# Patient Record
Sex: MALE | Race: WHITE | Employment: OTHER | URBAN - METROPOLITAN AREA
[De-identification: names, ages, dates, MRNs, and addresses within clinical notes are randomized per-mention and may not be internally consistent; named-entity substitution may affect disease eponyms.]

---

## 2018-01-08 ENCOUNTER — HOSPITAL ENCOUNTER (OUTPATIENT)
Dept: PHYSICAL THERAPY | Age: 73
Discharge: HOME | End: 2018-01-08
Payer: MEDICARE

## 2018-01-08 PROCEDURE — 97162 PT EVAL MOD COMPLEX 30 MIN: CPT | Performed by: PHYSICAL THERAPIST

## 2018-01-08 PROCEDURE — G8979 MOBILITY GOAL STATUS: HCPCS | Performed by: PHYSICAL THERAPIST

## 2018-01-08 PROCEDURE — 97140 MANUAL THERAPY 1/> REGIONS: CPT | Performed by: PHYSICAL THERAPIST

## 2018-01-08 PROCEDURE — G8978 MOBILITY CURRENT STATUS: HCPCS | Performed by: PHYSICAL THERAPIST

## 2018-01-08 PROCEDURE — 97110 THERAPEUTIC EXERCISES: CPT | Performed by: PHYSICAL THERAPIST

## 2018-01-08 NOTE — PROGRESS NOTES
PT DAILY TREATMENT NOTE - [facility]     Patient Name: Marguerite Tesfaye. Date:2018  : 1945  [x]  Patient  Verified  Payor: Giovanna Lynn / Plan: Alba Class / Product Type: Managed Care Medicare /    In time:301  Out time:341  Total Treatment Time (min): 40  Total Timed Codes (min): 30  1:1 Treatment Time ( [address] only): 40   Visit #: 1 of 24    Treatment Area: Left knee pain [M25.562]    SUBJECTIVE  Pain Level (0-10 scale): 3/10  Any medication changes, allergies to medications, adverse drug reactions, diagnosis change, or new procedure performed?: [x] No    [] Yes (see summary sheet for update)  Subjective functional status/changes:   [] No changes reported  HPI: Pt c/o increased pain in the L knee s/p TKR on 17. Reports he has been having a hard time getting his range of motion back and feels that he is falling behind. Reports on day following suurgery her rolled his compression stocking down and caused a tourniquet effect and increased pain in the L foot. Reports for two weeks he was recovering from that. States he had HHPT for 2-2.5 weeks following surgery but his insurance changed and had to stop HHPT and come to OPPT last week. Reports compliance w/ HEP and icing at home. Reports he only uses the [facility] when out of the house because of the ice. Reports 5 steps to enter his home w/ HR and is able to navigate then in step to pattern w/o difficulty. Pain increases w/ bending and decrease w/ sitting in his recliner w/ legs elevated. Hx of lumbar discectomy, L shoulder surgery to repair tendons, L CT sx, and gastric bypass. Pt is retired.      OBJECTIVE    Modality rationale: decrease edema and decrease inflammation to improve the patients ability to improve gait and activity tolerance    Min Type Additional Details    [] Estim:  []Unatt       []IFC  []Premod                        []Other:  []w/ice   []w/heat  Position:  Location:    [] Estim: []Att    []TENS instruct  []NMES []Other:  []w/US   []w/ice   []w/heat  Position:  Location:    []  Traction: [] Cervical       []Lumbar                       [] Prone          []Supine                       []Intermittent   []Continuous Lbs:  [] before manual  [] after manual    []  Ultrasound: []Continuous   [] Pulsed                           []1MHz   []3MHz W/cm2:  Location:    []  Iontophoresis with dexamethasone         Location: [] Take home patch   [] In clinic   7 (w/HEP review and instruction) [x]  Ice     []  heat  []  Ice massage  []  Laser   []  Anodyne Position: long sitting  Location: L knee    []  Laser with stim  []  Other:  Position:  Location:    []  Vasopneumatic Device Pressure:       [] lo [] med [] hi   Temperature: [] lo [] med [] hi   [] Skin assessment post-treatment:  []intact []redness- no adverse reaction    []redness - adverse reaction:     10 min [x]Eval                  []Re-Eval       15 min Therapeutic Exercise:  [] See flow sheet : reviewed HEP, progressed and instructed in new ex's   Rationale: increase ROM, increase strength, improve coordination and increase proprioception to improve the patients ability to decrease pain and improve gait     min Therapeutic Activity:  []  See flow sheet :   Rationale:   to improve the patients ability to       min Neuromuscular Re-education:  []  See flow sheet :   Rationale:   to improve the patients ability to     15 min Manual Therapy:  Knee flexion and ext mobs, patellar mobs, popliteal release   Rationale: decrease pain, increase ROM, increase tissue extensibility, decrease edema  and decrease trigger points to improve gait and activity tolerance      min Gait Training:  ___ feet with ___ device on level surfaces with ___ level of assist   Rationale:           With   [] TE   [] TA   [] neuro   [] other: Patient Education: [x] Review HEP    [] Progressed/Changed HEP based on:   [] positioning   [] body mechanics   [] transfers   [] heat/ice application    [] other: Other Objective/Functional Measures: Pt presents w/ antalgic gait and decrease L knee bend w/ ambulation, SPC R hand. AROM  L knee 9-93 deg (5-97 deg post manual therapy). Good patellar mobility noted, fair quad set, lag present w/ eccentric lowering from SLR. Incision is healing well and shows no signs of infection. Increased tightness noted in the L HS, gastroc and soleus, and popliteus. MMT L hip and knee grossly 3-3-/5 2' pain. Pain Level (0-10 scale) post treatment: 4-5/10    ASSESSMENT/Changes in Function: Focus on improving AROM and strength to improve stability and gait. Patient will continue to benefit from skilled PT services to modify and progress therapeutic interventions, address functional mobility deficits, address ROM deficits, address strength deficits, analyze and address soft tissue restrictions, analyze and cue movement patterns, analyze and modify body mechanics/ergonomics, address imbalance/dizziness and instruct in home and community integration to attain remaining goals. [x]  See Plan of Care  []  See progress note/recertification  []  See Discharge Summary         Progress towards goals / Updated goals:  Short Term Goals: To be accomplished in 2 weeks:  1. Pt will be independent and complaint w/ progressed HEP to progress gains in PT. At eval: reviewed, progressed, and initiated HEP  2. Pt will have AROM L knee to > or = to 3-105 deg for ease w/ return to normal ADLs. At eval: AROM L knee = 9-93 deg  Long Term Goals: To be accomplished in 8 weeks:  1. Pt will improve FOTO to > or = t 60 to demo improved function. At eval: FOTO = 32  2. Pt will increase AROM L knee to > or = to 3-120 deg for ease w/ normalizing gait. At eval: AROM L knee = 9-93 deg  3. Pt will increase MMT L LE to > or = to 4-4+/5 grossly for ease w/ stair navigation. At eval: MMT L hip and knee grossly 3-3-/5 2' pain  4.  Pt will demo correct heel toe gait w/o AD for ease w/ walking around the grocery store.    At eval: Pt presents w/ antalgic gait and decrease L knee bend w/ ambulation, SPC R hand     PLAN  []  Upgrade activities as tolerated     []  Continue plan of care  []  Update interventions per flow sheet       []  Discharge due to:_  [x]  Other: 3x/8 weeks     Bang More, PT 1/8/2018  4:02 PM    Future Appointments  Date Time Provider Gonzalo Deanna   1/11/2018 9:30 AM Donnita More, PT [facility]   1/12/2018 2:30 PM Elin Beverage, PTA [facility]   1/16/2018 10:00 AM Donnita More, PT [facility]   1/17/2018 7:30 AM Donnita More, PT [facility]   1/19/2018 3:00 PM Elin Beverage, PTA [facility]   1/22/2018 11:30 AM Elin Beverage, PTA [facility]   1/24/2018 8:30 AM [facility] PT [facility] 1 [facility]   1/26/2018 2:00 PM Donnita More, PT [facility]   1/29/2018 10:00 AM Elin Beverage, PTA [facility]   1/31/2018 10:00 AM Elin Beverage, PTA [facility]   2/1/2018 9:30 AM Donnita More, PT [facility]

## 2018-01-08 NOTE — PROGRESS NOTES
[facility] - [facility]              [address]        [place], [address]   [phone] fax    Plan of Care/ Statement of Necessity for Physical Therapy Services    Patient name: Jorge Adhikari Start of Care: 2018   Referral source: Alan Lake MD : 1945    Medical Diagnosis: Left knee pain [M25.562]   Onset Date:17    Treatment Diagnosis: s/p L TKR   Prior Hospitalization: see medical history Provider#: [id number]   Medications: Verified on Patient summary List    Comorbidities: allergies, BMI > 30, DM   Prior Level of Function: Hx of L knee pain, retired, walking w/o AD      The Plan of Care and following information is based on the information from the initial evaluation. Assessment/ key information: Pt is a 66 y/o male w/c/o increased pain in the L knee s/p TKR on 17. Reports he has been having a hard time getting his range of motion back and feels that he is falling behind. Reports on day following suurgery her rolled his compression stocking down and caused a tourniquet effect and increased pain in the L foot. Reports for two weeks he was recovering from that. States he had HHPT for 2-2.5 weeks following surgery but his insurance changed and had to stop HHPT and come to OPPT last week. Reports compliance w/ HEP and icing at home. Reports he only uses the [facility] when out of the house because of the ice. Reports 5 steps to enter his home w/ HR and is able to navigate then in step to pattern w/o difficulty. Pain increases w/ bending and decrease w/ sitting in his recliner w/ legs elevated. Hx of lumbar discectomy, L shoulder surgery to repair tendons, L CT sx, and gastric bypass. Pt is retired. Pt presents w/ antalgic gait and decrease L knee bend w/ ambulation, SPC R hand. AROM  L knee 9-93 deg (5-97 deg post manual therapy). Good patellar mobility noted, fair quad set, lag present w/ eccentric lowering from SLR.  Incision is healing well and shows no signs of infection. Increased tightness noted in the L HS, gastroc and soleus, and popliteus. MMT L hip and knee grossly 3-3-/5 2' pain. Pt will benefit from skilled PT to address the deficits and progress with pt goals. Evaluation Complexity History MEDIUM  Complexity : 1-2 comorbidities / personal factors will impact the outcome/ POC ; Examination MEDIUM Complexity : 3 Standardized tests and measures addressing body structure, function, activity limitation and / or participation in recreation  ;Presentation MEDIUM Complexity : Evolving with changing characteristics  ; Clinical Decision Making MEDIUM Complexity : FOTO score of 26-74  Overall Complexity Rating: MEDIUM  Problem List: pain affecting function, decrease ROM, decrease strength, edema affecting function, impaired gait/ balance, decrease ADL/ functional abilitiies, decrease activity tolerance and decrease transfer abilities   Treatment Plan may include any combination of the following: Therapeutic exercise, Therapeutic activities, Neuromuscular re-education, Physical agent/modality, Gait/balance training, Manual therapy, Patient education, Functional mobility training and Stair training  Patient / Family readiness to learn indicated by: asking questions, trying to perform skills and interest  Persons(s) to be included in education: patient (P)  Barriers to Learning/Limitations: None  Patient Goal (s): strengthen knee and leg to alleviate pain  Patient Self Reported Health Status: good  Rehabilitation Potential: good    Short Term Goals: To be accomplished in 2 weeks:  1. Pt will be independent and complaint w/ progressed HEP to progress gains in PT.   2. Pt will have AROM L knee to > or = to 3-105 deg for ease w/ return to normal ADLs. Long Term Goals: To be accomplished in 8 weeks:  1. Pt will improve FOTO to > or = to 60 to demo improved function.    2. Pt will increase AROM L knee to > or = to 3-120 deg for ease w/ normalizing gait. 3. Pt will increase MMT L LE to > or = to 4-4+/5 grossly for ease w/ stair navigation. 4. Pt will demo correct heel toe gait w/o AD for ease w/ walking around the grocery store. Frequency / Duration: Patient to be seen 3 times per week for 8 weeks. Patient/ Caregiver education and instruction: Diagnosis, prognosis, activity modification and exercises   [x]  Plan of care has been reviewed with PTA    G-Codes (GP)  Mobility   Current  CL= 60-79%   Goal  CK= 40-59%      The severity rating is based on clinical judgment and the FOTO score. Certification Period: 1/8/18 - 3/6/18  Tommy Bermudez, PT 1/8/2018 5:21 PM  ________________________________________________________________________    I certify that the above Therapy Services are being furnished while the patient is under my care. I agree with the treatment plan and certify that this therapy is necessary.     [de-identified] Signature:____________________  Date:____________Time: _________    Please sign and return to [facility] - [address], [address]  [phone] fax

## 2018-01-09 ENCOUNTER — APPOINTMENT (OUTPATIENT)
Dept: PHYSICAL THERAPY | Age: 73
End: 2018-01-09

## 2018-01-11 ENCOUNTER — APPOINTMENT (OUTPATIENT)
Dept: PHYSICAL THERAPY | Age: 73
End: 2018-01-11
Payer: MEDICARE

## 2018-01-11 ENCOUNTER — HOSPITAL ENCOUNTER (OUTPATIENT)
Dept: PHYSICAL THERAPY | Age: 73
Discharge: HOME | End: 2018-01-11
Payer: MEDICARE

## 2018-01-11 PROCEDURE — 97140 MANUAL THERAPY 1/> REGIONS: CPT | Performed by: PHYSICAL THERAPIST

## 2018-01-11 PROCEDURE — 97110 THERAPEUTIC EXERCISES: CPT | Performed by: PHYSICAL THERAPIST

## 2018-01-11 NOTE — PROGRESS NOTES
PT DAILY TREATMENT NOTE - [facility]     Patient Name: Luis Yuan. Date:2018  : 1945  [x]  Patient  Verified  Payor: Adonis Lemons / Plan: [employer] / Product Type: Managed Care Medicare /    In time:930  Out time:1031  Total Treatment Time (min): 61  Total Timed Codes (min): 55  1:1 Treatment Time ( [address] only): 30   Visit #: 2 of 24    Treatment Area: Left knee pain [M25.562]    SUBJECTIVE  Pain Level (0-10 scale): 310  Any medication changes, allergies to medications, adverse drug reactions, diagnosis change, or new procedure performed?: [x] No    [] Yes (see summary sheet for update)  Subjective functional status/changes:   [] No changes reported  Pt reports he had ulcers rupture in his stomach in over the weekend and has had a hard time doing anything because of the pain.      OBJECTIVE    Modality rationale: decrease edema, decrease inflammation and decrease pain to improve the patients ability to improve activity tolerance and soreness   Min Type Additional Details    [] Estim:  []Unatt       []IFC  []Premod                        []Other:  []w/ice   []w/heat  Position:  Location:    [] Estim: []Att    []TENS instruct  []NMES                    []Other:  []w/US   []w/ice   []w/heat  Position:  Location:    []  Traction: [] Cervical       []Lumbar                       [] Prone          []Supine                       []Intermittent   []Continuous Lbs:  [] before manual  [] after manual    []  Ultrasound: []Continuous   [] Pulsed                           []1MHz   []3MHz W/cm2:  Location:    []  Iontophoresis with dexamethasone         Location: [] Take home patch   [] In clinic   6 [x]  Ice     []  heat  []  Ice massage  []  Laser   []  Anodyne Position: supine w/ elevation  Location: L knee    []  Laser with stim  []  Other:  Position:  Location:    []  Vasopneumatic Device Pressure:       [] lo [] med [] hi   Temperature: [] lo [] med [] hi   [] Skin assessment post-treatment:  []intact []redness- no adverse reaction    []redness - adverse reaction:      min []Eval                  []Re-Eval       40 min Therapeutic Exercise:  [x] See flow sheet : initiated POC per flow sheet   Rationale: increase ROM, increase strength, improve coordination and increase proprioception to improve the patients ability to decrease pain and improve activity tolerance      min Therapeutic Activity:  []  See flow sheet :   Rationale:   to improve the patients ability to       min Neuromuscular Re-education:  []  See flow sheet :   Rationale:   to improve the patients ability to     15 min Manual Therapy:  Knee flexion and ext mobs, patellar mobs, popliteal release, STM to ITB and vastus lateralis    Rationale: decrease pain, increase ROM, increase tissue extensibility and decrease trigger points to improve gait and activity tolerance      min Gait Training:  ___ feet with ___ device on level surfaces with ___ level of assist   Rationale: With   [] TE   [] TA   [] neuro   [] other: Patient Education: [x] Review HEP    [] Progressed/Changed HEP based on:   [] positioning   [] body mechanics   [] transfers   [] heat/ice application    [] other:      Other Objective/Functional Measures: Pre manual AROM 14-90, post manual 5-95 deg     Pain Level (0-10 scale) post treatment: 4/10    ASSESSMENT/Changes in Function: Educated pt on not propping his knee w/ a pillow at night, educated on elevating whole leg or just the ankle but with just propping the knee will promote posterior mm tightness and decrease extension causing possibility of flexion contractures.  Encouraged pt to at least perform stretches and heel slides while he is feeling ill to maintain AROM and prevent contractures     Patient will continue to benefit from skilled PT services to modify and progress therapeutic interventions, address functional mobility deficits, address ROM deficits, address strength deficits, analyze and address soft tissue restrictions, analyze and cue movement patterns, analyze and modify body mechanics/ergonomics, address imbalance/dizziness and instruct in home and community integration to attain remaining goals. []  See Plan of Care  []  See progress note/recertification  []  See Discharge Summary         Progress towards goals / Updated goals:  Short Term Goals: To be accomplished in 2 weeks:  1. Pt will be independent and complaint w/ progressed HEP to progress gains in PT. At eval: reviewed, progressed, and initiated HEP  Current: not met, pt reports minimal exercises 2' illness 1/11/18  2. Pt will have AROM L knee to > or = to 3-105 deg for ease w/ return to normal ADLs. At eval: AROM L knee = 9-93 deg  Long Term Goals: To be accomplished in 8 weeks:  1. Pt will improve FOTO to > or = t 60 to demo improved function. At eval: FOTO = 32  2. Pt will increase AROM L knee to > or = to 3-120 deg for ease w/ normalizing gait. At eval: AROM L knee = 9-93 deg  3. Pt will increase MMT L LE to > or = to 4-4+/5 grossly for ease w/ stair navigation. At eval: MMT L hip and knee grossly 3-3-/5 2' pain  4. Pt will demo correct heel toe gait w/o AD for ease w/ walking around the grocery store.    At eval: Pt presents w/ antalgic gait and decrease L knee bend w/ ambulation, SPC R hand    PLAN  [x]  Upgrade activities as tolerated     [x]  Continue plan of care  []  Update interventions per flow sheet       []  Discharge due to:_  []  Other:_      Micheline Christensen PT 1/11/2018  9:40 AM    Future Appointments  Date Time Provider Gonzalo Huerta   1/12/2018 2:30 PM Willa Post PTA [facility]   1/16/2018 10:00 AM Micheline Christensen PT [facility]   1/17/2018 7:30 AM [facility]   1/19/2018 3:00 PM Willa Post PTA [facility]   1/22/2018 11:30 AM Willa Post, PTA [facility]   1/24/2018 8:30 AM [facility]   1/26/2018 2:00 PM Micheline hCristensen PT [facility]   1/29/2018 10:00 AM Chris Kern, PTA [facility]   1/31/2018 10:00 AM Chris Kern, PTA [facility]   2/1/2018 9:30 AM Mt Larson, PT [facility]

## 2018-01-12 ENCOUNTER — HOSPITAL ENCOUNTER (OUTPATIENT)
Dept: PHYSICAL THERAPY | Age: 73
Discharge: HOME | End: 2018-01-12
Payer: MEDICARE

## 2018-01-12 PROCEDURE — 97140 MANUAL THERAPY 1/> REGIONS: CPT

## 2018-01-12 PROCEDURE — 97110 THERAPEUTIC EXERCISES: CPT

## 2018-01-12 NOTE — PROGRESS NOTES
PT DAILY TREATMENT NOTE - [facility]     Patient Name: Bárbara Prince. Date:2018  : 1945  [x]  Patient  Verified  Payor: Merline Sloan / Plan: Makeda Cespedes / Product Type: Managed Care Medicare /    In time:227  Out time:310  Total Treatment Time (min): 43  Total Timed Codes (min): 43  1:1 Treatment Time ( [address] only): 43   Visit #: 3 of 24    Treatment Area: Left knee pain [M25.562]    SUBJECTIVE  Pain Level (0-10 scale): 2  Any medication changes, allergies to medications, adverse drug reactions, diagnosis change, or new procedure performed?: [x] No    [] Yes (see summary sheet for update)  Subjective functional status/changes:   [] No changes reported  Patient reports continuation of L ankle discomfort and swelling with cessation of anti-inflammatory medication secondary to stomach ulcers.     OBJECTIVE    33 min Therapeutic Exercise:  [x] See flow sheet : Emphasis placed on improving available knee AROM and strength and improving weightbearing functional mobility   Rationale: increase ROM and increase strength to improve the patients ability to improve ease with prolonged ambulation    10 min Manual Therapy:    Supine, L Femur AP Grade II Mobilization (TKE)  Supine, L Hamstring Contract-Relax  Supine, L Hamstring STM with Stick (90-90 position)  Supine, L Knee Extension Physiological Extension Mobilization (90-90)   Rationale: decrease pain, increase ROM and increase tissue extensibility to improve ease with transfers          With   [] TE   [] TA   [] neuro   [] other: Patient Education: [x] Review HEP    [] Progressed/Changed HEP based on:   [] positioning   [] body mechanics   [] transfers   [] heat/ice application    [] other:      Other Objective/Functional Measures:     Pre-Manual Knee AROM: 0-  Post-Manual Knee AROM: 0-7-104    Pain Level (0-10 scale) post treatment: 2    ASSESSMENT/Changes in Function: Question difficulties with achieving L knee terminal extension AROM secondary to chronic R knee flexion contracture with patient ambulating with maintenance of forward trunk flexion. Patient with poor activity tolerance thus limiting therapy progression. Patient will continue to benefit from skilled PT services to modify and progress therapeutic interventions, address functional mobility deficits, address ROM deficits, address strength deficits, analyze and address soft tissue restrictions, analyze and cue movement patterns and analyze and modify body mechanics/ergonomics to attain remaining goals. []  See Plan of Care  []  See progress note/recertification  []  See Discharge Summary         Progress towards goals / Updated goals:    Short Term Goals: To be accomplished in 2 weeks:  1. Pt will be independent and complaint w/ progressed HEP to progress gains in PT. At eval: reviewed, progressed, and initiated HEP  Current: not met, pt reports minimal exercises 2' illness 1/11/18  2. Pt will have AROM L knee to > or = to 3-105 deg for ease w/ return to normal ADLs. At eval: AROM L knee = 9-93 deg  Long Term Goals: To be accomplished in 8 weeks:  1. Pt will improve FOTO to > or = t 60 to demo improved function. At eval: FOTO = 32  2. Pt will increase AROM L knee to > or = to 3-120 deg for ease w/ normalizing gait. At eval: AROM L knee = 9-93 deg  3. Pt will increase MMT L LE to > or = to 4-4+/5 grossly for ease w/ stair navigation. At eval: MMT L hip and knee grossly 3-3-/5 2' pain  4. Pt will demo correct heel toe gait w/o AD for ease w/ walking around the grocery store.    At eval: Pt presents w/ antalgic gait and decrease L knee bend w/ ambulation, SPC R hand    PLAN  [x]  Upgrade activities as tolerated     [x]  Continue plan of care  []  Update interventions per flow sheet       []  Discharge due to:_  []  Other:_      Rosalino Cagle, PT 1/12/2018  2:45 PM    Future Appointments  Date Time Provider Gonzalo Huerta   1/16/2018 10:00 AM Pily Swan, PT [facility]   1/17/2018 7:30 AM Tommy Bermudez, PT [facility]   1/19/2018 3:00 PM Luis Enrique Clinton PTA [facility]   1/22/2018 11:30 AM Luis Enrique Clinton PTA [facility]   1/24/2018 8:30 AM [facility] PT SU[facility] 1 [facility]   1/26/2018 2:00 PM Tommy Bermudez, PT [facility]   1/29/2018 10:00 AM Luis Enrique Clinton, VIOLETTE [facility]   1/31/2018 10:00 AM Luis Enrique Clinton PTA [facility]   2/1/2018 9:30 AM Tommy Bermudez, PT [facility]

## 2018-01-16 ENCOUNTER — APPOINTMENT (OUTPATIENT)
Dept: PHYSICAL THERAPY | Age: 73
End: 2018-01-16
Payer: MEDICARE

## 2018-01-17 ENCOUNTER — APPOINTMENT (OUTPATIENT)
Dept: PHYSICAL THERAPY | Age: 73
End: 2018-01-17
Payer: MEDICARE

## 2018-01-18 ENCOUNTER — HOSPITAL ENCOUNTER (OUTPATIENT)
Dept: PHYSICAL THERAPY | Age: 73
Discharge: HOME | End: 2018-01-18
Payer: MEDICARE

## 2018-01-18 PROCEDURE — 97140 MANUAL THERAPY 1/> REGIONS: CPT

## 2018-01-18 PROCEDURE — 97110 THERAPEUTIC EXERCISES: CPT

## 2018-01-18 NOTE — PROGRESS NOTES
PT DAILY TREATMENT NOTE - [facility]     Patient Name: Tala Vivas. Date:2018  : 1945  [x]  Patient  Verified  Payor: Capri Wilson / Plan: Rashid Mayo / Product Type: Managed Care Medicare /    In time:159  Out time:244  Total Treatment Time (min): 45  Total Timed Codes (min): 45  1:1 Treatment Time ( [address] only): 30   Visit #: 4 of 24    Treatment Area: Left knee pain [M25.562]    SUBJECTIVE  Pain Level (0-10 scale): 3  Any medication changes, allergies to medications, adverse drug reactions, diagnosis change, or new procedure performed?: [x] No    [] Yes (see summary sheet for update)  Subjective functional status/changes:   [] No changes reported  Patient reports going to the ER Friday, 2018, secondary to the development of chest pain with patient reporting all imaging and testing completed negative. Received note from discharge MD regarding medical clearance and continuation of PT services.     OBJECTIVE    35 min Therapeutic Exercise:  [x] See flow sheet : Emphasis placed on improving available knee AROM and strength and improving weightbearing functional mobility   Rationale: increase ROM and increase strength to improve the patients ability to improve ease with prolonged ambulation     10 min Manual Therapy:    Supine, L Femur AP Grade II Mobilization (TKE)  Supine, L Hamstring STM (90-90 position)  Supine, L Knee Extension Physiological Extension Mobilization (90-90)   Rationale: decrease pain, increase ROM and increase tissue extensibility to improve ease with transfers       With   [] TE   [] TA   [] neuro   [] other: Patient Education: [x] Review HEP    [] Progressed/Changed HEP based on:   [] positioning   [] body mechanics   [] transfers   [] heat/ice application    [] other:      Other Objective/Functional Measures: BP: 128/72 mmHg     Pre-Manual Knee AROM: 0 - 8 - 96  Post-Manual Knee AROM: 0 - 5 - 97    Pain Level (0-10 scale) post treatment: 2    ASSESSMENT/Changes in Function: Patient demonstrated BP WNL at initiation of treatment session with patient subjectively reporting good tolerance to exercise without any adverse effects. With recent hospitalization exercises not progressed to ensure appropriate tolerance. Patient noted with continued significant hypertonicity of the hamstring musculature with poor tolerance to STM thus limiting extent of performance. Patient will continue to benefit from skilled PT services to modify and progress therapeutic interventions, address functional mobility deficits, address ROM deficits, address strength deficits, analyze and address soft tissue restrictions, analyze and cue movement patterns and analyze and modify body mechanics/ergonomics to attain remaining goals. []  See Plan of Care  []  See progress note/recertification  []  See Discharge Summary         Progress towards goals / Updated goals:    Short Term Goals: To be accomplished in 2 weeks:  1. Pt will be independent and complaint w/ progressed HEP to progress gains in PT. At [facility]: reviewed, progressed, and initiated HEP  Current: Met, Performance reported as prescribed, 1/18/2018  2. Pt will have AROM L knee to > or = to 3-105 deg for ease w/ return to normal ADLs. At [facility]: AROM L knee = 9-93 deg  Current: Progressing, L Knee AROM 0-8-96 deg, 1/18/2018  Long Term Goals: To be accomplished in 8 weeks:  1. Pt will improve FOTO to > or = t 60 to demo improved function. At [facility]: FOTO = 32  2. Pt will increase AROM L knee to > or = to 3-120 deg for ease w/ normalizing gait. At eval: AROM L knee = 9-93 deg  3. Pt will increase MMT L LE to > or = to 4-4+/5 grossly for ease w/ stair navigation. At [facility]: MMT L hip and knee grossly 3-3-/5 2' pain  4. Pt will demo correct heel toe gait w/o AD for ease w/ walking around the grocery store.    At [facility]: Pt presents w/ antalgic gait and decrease L knee bend w/ ambulation, SPC R hand    PLAN  [x]  Upgrade activities as tolerated     [x]  Continue plan of care  []  Update interventions per flow sheet       []  Discharge due to:_  []  Other:_      Candice Gomez, PT 1/18/2018  1:50 PM    Future Appointments  Date Time Provider Gonzalo Deanna   1/18/2018 2:00 PM Candice Gomez, PT [facility]   1/19/2018 3:00 PM Chuckie Quiroga PTA [facility]   1/22/2018 11:30 AM Chuckie Quiroga PTA [facility]   1/24/2018 8:30 AM [facility] PT [place] [facility]   1/26/2018 2:00 PM Lisa Liang, PT [facility]   1/29/2018 10:00 AM Chuckie Quiroga PTA [facility]   1/31/2018 10:00 AM Chuckie Quiroga PTA [facility]   2/1/2018 9:30 AM Lisa Liang, PT [facility]

## 2018-01-19 ENCOUNTER — HOSPITAL ENCOUNTER (OUTPATIENT)
Dept: PHYSICAL THERAPY | Age: 73
Discharge: HOME | End: 2018-01-19
Payer: MEDICARE

## 2018-01-19 PROCEDURE — 97140 MANUAL THERAPY 1/> REGIONS: CPT

## 2018-01-19 PROCEDURE — 97110 THERAPEUTIC EXERCISES: CPT

## 2018-01-19 NOTE — PROGRESS NOTES
PT DAILY TREATMENT NOTE - [facility]     Patient Name: Lizbet Roque. Date:2018  : 1945  [x]  Patient  Verified  Payor: Denita Franz / Plan: Earle Razo / Product Type: Managed Care Medicare /    In time:2:55  Out time:3:51  Total Treatment Time (min): 56  Total Timed Codes (min): 46  1:1 Treatment Time ( [address] only): 30   Visit #: 5 of 24    Treatment Area: Left knee pain [M25.562]    SUBJECTIVE  Pain Level (0-10 scale)0  Any medication changes, allergies to medications, adverse drug reactions, diagnosis change, or new procedure performed?: [x] No    [] Yes (see summary sheet for update)  Subjective functional status/changes:   [] No changes reported  Pt reports that he is feeling very sore in the front and back of his thighs. OBJECTIVE    Modality rationale: decrease pain to improve the patients ability to decrease difficulty while performing tasks.     Min Type Additional Details    [] Estim:  []Unatt       []IFC  []Premod                        []Other:  []w/ice   []w/heat  Position:  Location:    [] Estim: []Att    []TENS instruct  []NMES                    []Other:  []w/US   []w/ice   []w/heat  Position:  Location:    []  Traction: [] Cervical       []Lumbar                       [] Prone          []Supine                       []Intermittent   []Continuous Lbs:  [] before manual  [] after manual    []  Ultrasound: []Continuous   [] Pulsed                           []1MHz   []3MHz W/cm2:  Location:    []  Iontophoresis with dexamethasone         Location: [] Take home patch   [] In clinic   10 [x]  Ice     []  heat  []  Ice massage  []  Laser   []  Anodyne Position:supine  Location:L knee    []  Laser with stim  []  Other:  Position:  Location:    []  Vasopneumatic Device Pressure:       [] lo [] med [] hi   Temperature: [] lo [] med [] hi   [] Skin assessment post-treatment:  []intact []redness- no adverse reaction    []redness - adverse reaction:       36 min Therapeutic Exercise:  [x] See flow sheet :   Rationale: increase ROM and increase strength to improve the patients ability to increase tolerance to activities. 10 min Manual Therapy:   Knee flexion and ext mobs, patellar mobs, popliteal release   Rationale: decrease pain, increase ROM, increase tissue extensibility and decrease trigger points to increase tolerance to activities. With   [] TE   [] TA   [] neuro   [] other: Patient Education: [x] Review HEP    [] Progressed/Changed HEP based on:   [] positioning   [] body mechanics   [] transfers   [] heat/ice application    [] other:      Other Objective/Functional Measures:      Pain Level (0-10 scale) post treatment: 3    ASSESSMENT/Changes in Function: Continue to progress pt as tolerated. Pt c/o tightness along quads and hamstrings. Patient will continue to benefit from skilled PT services to modify and progress therapeutic interventions, address functional mobility deficits, address ROM deficits, address strength deficits and analyze and address soft tissue restrictions to attain remaining goals. []  See Plan of Care  []  See progress note/recertification  []  See Discharge Summary         Progress towards goals / Updated goals:  Short Term Goals: To be accomplished in 2 weeks:  1. Pt will be independent and complaint w/ progressed HEP to progress gains in PT. At [facility]: reviewed, progressed, and initiated HEP  Current: Met, Performance reported as prescribed, 1/18/2018  2. Pt will have AROM L knee to > or = to 3-105 deg for ease w/ return to normal ADLs. At [facility]: AROM L knee = 9-93 deg  Current: Progressing, L Knee AROM 0-8-96 deg, 1/18/2018  Long Term Goals: To be accomplished in 8 weeks:  1. Pt will improve FOTO to > or = t 60 to demo improved function. At eval: FOTO = 32  2. Pt will increase AROM L knee to > or = to 3-120 deg for ease w/ normalizing gait. At eval: AROM L knee = 9-93 deg  3.  Pt will increase MMT L LE to > or = to 4-4+/5 grossly for ease w/ stair navigation. At eval: MMT L hip and knee grossly 3-3-/5 2' pain  4. Pt will demo correct heel toe gait w/o AD for ease w/ walking around the grocery store.    At eval: Pt presents w/ antalgic gait and decrease L knee bend w/ ambulation, SPC R hand    PLAN  []  Upgrade activities as tolerated     [x]  Continue plan of care  []  Update interventions per flow sheet       []  Discharge due to:_  []  Other:_      Willa Post PTA 1/19/2018  3:15 PM    Future Appointments  Date Time Provider Gonzalo Huerta   1/22/2018 11:30 AM Willa Post PTA [facility]   1/24/2018 8:30 AM [facility] PT [place] [address] [facility]   1/26/2018 2:00 PM Micheline Christensen, PT [facility]   1/29/2018 10:00 AM Willa Post PTA [facility]   1/31/2018 10:00 AM Willa Post PTA [facility]   2/2/2018 12:00 PM Micheline Christensen, PT [facility]

## 2018-01-22 ENCOUNTER — HOSPITAL ENCOUNTER (OUTPATIENT)
Dept: PHYSICAL THERAPY | Age: 73
Discharge: HOME | End: 2018-01-22
Payer: MEDICARE

## 2018-01-22 PROCEDURE — 97140 MANUAL THERAPY 1/> REGIONS: CPT

## 2018-01-22 PROCEDURE — 97110 THERAPEUTIC EXERCISES: CPT

## 2018-01-22 NOTE — PROGRESS NOTES
PT DAILY TREATMENT NOTE - [facility]     Patient Name: Marielena Francisco. Date:2018  : 1945  [x]  Patient  Verified  Payor: Ashwin Arreola / Plan: Greta Lauren / Product Type: Managed Care Medicare /    In time:11:27  Out time:12:19  Total Treatment Time (min): 52  Total Timed Codes (min): 42  1:1 Treatment Time ( only): 25   Visit #: 6 of 24    Treatment Area: Left knee pain [M25.562]    SUBJECTIVE  Pain Level (0-10 scale): 0  Any medication changes, allergies to medications, adverse drug reactions, diagnosis change, or new procedure performed?: [x] No    [] Yes (see summary sheet for update)  Subjective functional status/changes:   [] No changes reported  Pt reports that most of his pain is in his hamstrings. OBJECTIVE    Modality rationale: decrease pain to improve the patients ability to decrease difficulty while performing tasks.     Min Type Additional Details    [] Estim:  []Unatt       []IFC  []Premod                        []Other:  []w/ice   []w/heat  Position:  Location:    [] Estim: []Att    []TENS instruct  []NMES                    []Other:  []w/US   []w/ice   []w/heat  Position:  Location:    []  Traction: [] Cervical       []Lumbar                       [] Prone          []Supine                       []Intermittent   []Continuous Lbs:  [] before manual  [] after manual    []  Ultrasound: []Continuous   [] Pulsed                           []1MHz   []3MHz W/cm2:  Location:    []  Iontophoresis with dexamethasone         Location: [] Take home patch   [] In clinic   10 [x]  Ice     []  heat  []  Ice massage  []  Laser   []  Anodyne Position:sitting  Location:L knee    []  Laser with stim  []  Other:  Position:  Location:    []  Vasopneumatic Device Pressure:       [] lo [] med [] hi   Temperature: [] lo [] med [] hi   [] Skin assessment post-treatment:  []intact []redness- no adverse reaction    []redness - adverse reaction:     32 min Therapeutic Exercise:  [x] See flow sheet :   Rationale: increase ROM and increase strength to improve the patients ability to increase tolerance to activities.          10 min Manual Therapy:   Knee flexion and ext mobs, patellar mobs, popliteal release   Rationale: decrease pain, increase ROM, increase tissue extensibility and decrease trigger points to increase tolerance to activities. With   [] TE   [] TA   [] neuro   [] other: Patient Education: [x] Review HEP    [] Progressed/Changed HEP based on:   [] positioning   [] body mechanics   [] transfers   [] heat/ice application    [] other:      Other Objective/Functional Measures: pt ambulates with no AD with antalgic gait. Pain Level (0-10 scale) post treatment: 0    ASSESSMENT/Changes in Function: Continue to progress pt as tolerated. Patient will continue to benefit from skilled PT services to modify and progress therapeutic interventions, address functional mobility deficits, address ROM deficits, address strength deficits and analyze and address soft tissue restrictions to attain remaining goals. []  See Plan of Care  []  See progress note/recertification  []  See Discharge Summary         Progress towards goals / Updated goals:  Short Term Goals: To be accomplished in 2 weeks:  1. Pt will be independent and complaint w/ progressed HEP to progress gains in PT. At eval: reviewed, progressed, and initiated HEP  Current: Met, Performance reported as prescribed, 1/18/2018  2. Pt will have AROM L knee to > or = to 3-105 deg for ease w/ return to normal ADLs. At eval: AROM L knee = 9-93 deg  Current: Progressing, L Knee AROM 0-8-96 deg, 1/18/2018  Long Term Goals: To be accomplished in 8 weeks:  1. Pt will improve FOTO to > or = t 60 to demo improved function. At eval: FOTO = 32  2. Pt will increase AROM L knee to > or = to 3-120 deg for ease w/ normalizing gait. At eval: AROM L knee = 9-93 deg  3.  Pt will increase MMT L LE to > or = to 4-4+/5 grossly for ease w/ stair navigation. At eval: MMT L hip and knee grossly 3-3-/5 2' pain  4. Pt will demo correct heel toe gait w/o AD for ease w/ walking around the grocery store. At eval: Pt presents w/ antalgic gait and decrease L knee bend w/ ambulation, SPC R hand  Current: Progressing: Pt is ambulates with no AD with antalgic gait.  1/22/18    PLAN  []  Upgrade activities as tolerated     [x]  Continue plan of care  []  Update interventions per flow sheet       []  Discharge due to:_  []  Other:_      Jose Hollingsworth PTA 1/22/2018  11:22 AM    Future Appointments  Date Time Provider Gnozalo Deanna   1/22/2018 11:30 AM Jose Hollingsworth PTA [facility]   1/24/2018 8:30 AM [facility] PT [place] 1 [facility]   1/26/2018 2:00 PM Kacidelmar Modi, PT [facility]   1/29/2018 10:00 AM Jose Hollingsworth PTA [facility]   1/31/2018 10:00 AM Jose Hollingsworth PTA [facility]   2/2/2018 12:00 PM Kaci Hemp, PT [facility]

## 2018-01-24 ENCOUNTER — HOSPITAL ENCOUNTER (OUTPATIENT)
Dept: PHYSICAL THERAPY | Age: 73
Discharge: HOME | End: 2018-01-24
Payer: MEDICARE

## 2018-01-24 PROCEDURE — 97110 THERAPEUTIC EXERCISES: CPT

## 2018-01-24 PROCEDURE — 97140 MANUAL THERAPY 1/> REGIONS: CPT

## 2018-01-24 NOTE — PROGRESS NOTES
PT DAILY TREATMENT NOTE - [facility]     Patient Name: Km Jimenez. Date:2018  : 1945  [x]  Patient  Verified  Payor: Ale Stuart / Plan: Nan Thomas / Product Type: Managed Care Medicare /    In TSPF:[id number]  Out time:0650  Total Treatment Time (min): 55  Total Timed Codes (min): 45  1:1 Treatment Time ([address] only): 40   Visit #: 7 of 24    Treatment Area: Left knee pain [M25.562]    SUBJECTIVE  Pain Level (0-10 scale): 2  Any medication changes, allergies to medications, adverse drug reactions, diagnosis change, or new procedure performed?: [x] No    [] Yes (see summary sheet for update)  Subjective functional status/changes:   [] No changes reported  Patient reports seeing referring MD this afternoon with MD raising concern regarding the presence of surgical incision redness with patient to return back to MD next week to ensure appropriate healing.     OBJECTIVE    Modality rationale: decrease edema, decrease inflammation and decrease pain to improve the patients ability to improve ease with sleep   Min Type Additional Details   10 [x]  Ice     []  heat  []  Ice massage Position: Reclined  Location: R Knee, Post-Tx     35 min Therapeutic Exercise:  [x] See flow sheet : Emphasis placed on improving available knee AROM and strength and improving weightbearing functional mobility   Rationale: increase ROM and increase strength to improve the patients ability to improve ease with prolonged ambulation      10 min Manual Therapy:    Supine, L Femur AP Grade II Mobilization (TKE)  Supine, L Hamstring STM (90-90 position)  Supine, L Knee Extension Physiological Extension Mobilization (90-90)   Rationale: decrease pain, increase ROM and increase tissue extensibility to improve ease with transfers     With   [] TE   [] TA   [] neuro   [] other: Patient Education: [x] Review HEP    [] Progressed/Changed HEP based on:   [] positioning   [] body mechanics   [] transfers   [] heat/ice application    [] other:      Other Objective/Functional Measures:     Pre-Manual Knee AROM: 0 - 10 - 100  Post-Manual Knee AROM: 0 - 6 - NT    Pain Level (0-10 scale) post treatment: 0    ASSESSMENT/Changes in Function: Improvement in R knee flexion AROM demonstrated with continued significant limitations with completion of extension AROM. Patient benefits from verbal cuing to reduce hip abduction moment with stair management. Patient will continue to benefit from skilled PT services to modify and progress therapeutic interventions, address functional mobility deficits, address ROM deficits, address strength deficits and analyze and address soft tissue restrictions to attain remaining goals. []  See Plan of Care  []  See progress note/recertification  []  See Discharge Summary         Progress towards goals / Updated goals:    Short Term Goals: To be accomplished in 2 weeks:  1. Pt will be independent and complaint w/ progressed HEP to progress gains in PT. At [facility]: reviewed, progressed, and initiated HEP  Current: Met, Performance reported as prescribed, 1/18/2018  2. Pt will have AROM L knee to > or = to 3-105 deg for ease w/ return to normal ADLs. At [facility]: AROM L knee = 9-93 deg  Current: Progressing, L Knee AROM 0-8-96 deg, 1/18/2018  Long Term Goals: To be accomplished in 8 weeks:  1. Pt will improve FOTO to > or = t 60 to demo improved function. At [facility]: FOTO = 32  Current: Progressing, FOTO = 42, 1/24/2018  2. Pt will increase AROM L knee to > or = to 3-120 deg for ease w/ normalizing gait. At [facility]: AROM L knee = 9-93 deg  3. Pt will increase MMT L LE to > or = to 4-4+/5 grossly for ease w/ stair navigation. At [facility]: MMT L hip and knee grossly 3-3-/5 2' pain  4. Pt will demo correct heel toe gait w/o AD for ease w/ walking around the grocery store.    At [facility]: Pt presents w/ antalgic gait and decrease L knee bend w/ ambulation, SPC R hand  Current: Progressing: Pt is ambulates with no AD with antalgic gait.  1/22/18    PLAN  [x]  Upgrade activities as tolerated     [x]  Continue plan of care  []  Update interventions per flow sheet       []  Discharge due to:_  []  Other:_      Kory Fitzpatrick, PT 1/24/2018  1:48 PM    Future Appointments  Date Time Provider Gonzalo Huerta   1/24/2018 6:00 PM Dana Sher [facility]   1/26/2018 2:00 PM Sheeba Ley, PT [facility]   1/29/2018 10:00 AM Usha Kumar PTA [facility]   1/31/2018 10:00 AM Usha Kumar PTA [facility]   2/2/2018 12:00 PM Kathryn Dewey, PT [facility]         \

## 2018-01-26 ENCOUNTER — HOSPITAL ENCOUNTER (OUTPATIENT)
Dept: PHYSICAL THERAPY | Age: 73
Discharge: HOME | End: 2018-01-26
Payer: MEDICARE

## 2018-01-26 PROCEDURE — 97110 THERAPEUTIC EXERCISES: CPT | Performed by: PHYSICAL THERAPIST

## 2018-01-26 PROCEDURE — 97140 MANUAL THERAPY 1/> REGIONS: CPT | Performed by: PHYSICAL THERAPIST

## 2018-01-26 NOTE — PROGRESS NOTES
PT DAILY TREATMENT NOTE - [facility]     Patient Name: David Pereira. Date:2018  : 1945  [x]  Patient  Verified  Payor: Krissy Acevedo / Plan: Dorinda Lagos / Product Type: Managed Care Medicare /    In time:2:00  Out time:2:58  Total Treatment Time (min): 58  Total Timed Codes (min): 48  1:1 Treatment Time ([address] only): 40   Visit #: 8 of 24    Treatment Area: Left knee pain [M25.562]    SUBJECTIVE  Pain Level (0-10 scale): 1-2  Any medication changes, allergies to medications, adverse drug reactions, diagnosis change, or new procedure performed?: [x] No    [] Yes (see summary sheet for update)  Subjective functional status/changes:   [] No changes reported  Feels alright. Just got out of the shower. No new issues. OBJECTIVE    Modality rationale: decrease pain to improve the patients ability to complete ADLs   Min Type Additional Details   10 [x]  Ice     []  heat  []  Ice massage  []  Laser   []  Anodyne Position: reclined  Location: L knee   [] Skin assessment post-treatment:  []intact []redness- no adverse reaction    []redness - adverse reaction:     40 min Therapeutic Exercise:  [x] See flow sheet :   Rationale: increase ROM, increase strength and decrease pain to improve the patients ability to complete ADLs    8 min Manual Therapy:    Supine, L Femur AP Grade II Mobilization (TKE)  Supine, L Hamstring STM (90-90 position)  Supine, L Knee Extension Physiological Extension Mobilization (90-90)   Rationale: decrease pain and increase ROM to complete ADLs            With   [] TE   [] TA   [] neuro   [] other: Patient Education: [x] Review HEP    [] Progressed/Changed HEP based on:   [] positioning   [] body mechanics   [] transfers   [] heat/ice application    [] other:         Pain Level (0-10 scale) post treatment: 1    ASSESSMENT/Changes in Function: Patient responds well to treatment session. Incision remains somewhat red, along with pitting edema.  Moderate tolerance to manual stretching. No adverse effects were noted from today's treatment session      Patient will continue to benefit from skilled PT services to modify and progress therapeutic interventions, address functional mobility deficits, address ROM deficits, address strength deficits, analyze and address soft tissue restrictions, analyze and cue movement patterns, analyze and modify body mechanics/ergonomics and assess and modify postural abnormalities to attain remaining goals. []  See Plan of Care  []  See progress note/recertification  []  See Discharge Summary         Progress towards goals / Updated goals:  Short Term Goals: To be accomplished in 2 weeks:  1. Pt will be independent and complaint w/ progressed HEP to progress gains in PT. At eval: reviewed, progressed, and initiated HEP  Current: Met, Performance reported as prescribed, 1/18/2018  2. Pt will have AROM L knee to > or = to 3-105 deg for ease w/ return to normal ADLs. At [place] [facility]: AROM L knee = 9-93 deg  Current: Progressing, L Knee AROM 0-8-96 deg, 1/18/2018  Long Term Goals: To be accomplished in 8 weeks:  1. Pt will improve FOTO to > or = t 60 to demo improved function. At eval: FOTO = 32  Current: Progressing, FOTO = 42, 1/24/2018  2. Pt will increase AROM L knee to > or = to 3-120 deg for ease w/ normalizing gait. At [place] [facility]: AROM L knee = 9-93 deg  3. Pt will increase MMT L LE to > or = to 4-4+/5 grossly for ease w/ stair navigation. At eval: MMT L hip and knee grossly 3-3-/5 2' pain  4. Pt will demo correct heel toe gait w/o AD for ease w/ walking around the grocery store. At eval: Pt presents w/ antalgic gait and decrease L knee bend w/ ambulation, SPC R hand  Current: Progressing: Pt is ambulates with no AD with antalgic gait.  1/22/18    PLAN  []  Upgrade activities as tolerated     [x]  Continue plan of care  []  Update interventions per flow sheet       []  Discharge due to:_  []  Other:_      Ernst Mood, PT, DPT 1/26/2018  1:55 PM    Future Appointments  Date Time Provider Gonzalo Deanna   1/26/2018 2:00 PM Dana Iglesias [facility]   1/29/2018 10:00 AM Elaine Kirk PTA [facility]   1/31/2018 10:00 AM Elaine Kirk PTA [facility]   2/2/2018 12:00 PM Prachi Reina PT [facility]

## 2018-01-29 ENCOUNTER — HOSPITAL ENCOUNTER (OUTPATIENT)
Dept: PHYSICAL THERAPY | Age: 73
Discharge: HOME | End: 2018-01-29
Payer: MEDICARE

## 2018-01-29 PROCEDURE — 97140 MANUAL THERAPY 1/> REGIONS: CPT

## 2018-01-29 PROCEDURE — G8978 MOBILITY CURRENT STATUS: HCPCS

## 2018-01-29 PROCEDURE — G8979 MOBILITY GOAL STATUS: HCPCS

## 2018-01-29 PROCEDURE — 97110 THERAPEUTIC EXERCISES: CPT

## 2018-01-29 NOTE — PROGRESS NOTES
PT DAILY TREATMENT NOTE - [facility]     Patient Name: Stas Mora. Date:2018  : 1945  [x]  Patient  Verified  Payor: Mai Hobson / Plan: Cathryn Ahumada / Product Type: Managed Care Medicare /    In time:9:59  Out time:10:56  Total Treatment Time (min): 57  Total Timed Codes (min): 47  1:1 Treatment Time ( [address] only): 30   Visit #: 9 of 24    Treatment Area: Left knee pain [M25.562]    SUBJECTIVE  Pain Level (0-10 scale): 2  Any medication changes, allergies to medications, adverse drug reactions, diagnosis change, or new procedure performed?: [x] No    [] Yes (see summary sheet for update)  Subjective functional status/changes:   [] No changes reported  Pt report that he can not sleep at night due to his increase in knee pain. OBJECTIVE    Modality rationale: decrease pain to improve the patients ability to decrease difficulty while performing tasks.     Min Type Additional Details    [] Estim:  []Unatt       []IFC  []Premod                        []Other:  []w/ice   []w/heat  Position:  Location:    [] Estim: []Att    []TENS instruct  []NMES                    []Other:  []w/US   []w/ice   []w/heat  Position:  Location:    []  Traction: [] Cervical       []Lumbar                       [] Prone          []Supine                       []Intermittent   []Continuous Lbs:  [] before manual  [] after manual    []  Ultrasound: []Continuous   [] Pulsed                           []1MHz   []3MHz W/cm2:  Location:    []  Iontophoresis with dexamethasone         Location: [] Take home patch   [] In clinic   10 [x]  Ice     []  heat  []  Ice massage  []  Laser   []  Anodyne Position:sitting  Location:L knee    []  Laser with stim  []  Other:  Position:  Location:    []  Vasopneumatic Device Pressure:       [] lo [] med [] hi   Temperature: [] lo [] med [] hi   [] Skin assessment post-treatment:  []intact []redness- no adverse reaction    []redness - adverse reaction:       37 min Therapeutic Exercise:  [x] See flow sheet :   Rationale: increase ROM and increase strength to improve the patients ability to increase tolerance to activities.             10 min Manual Therapy:   Knee flexion and ext mobs, patellar mobs, popliteal release   Rationale: decrease pain, increase ROM, increase tissue extensibility and decrease trigger points to increase tolerance to activities. With   [] TE   [] TA   [] neuro   [] other: Patient Education: [x] Review HEP    [] Progressed/Changed HEP based on:   [] positioning   [] body mechanics   [] transfers   [] heat/ice application    [] other:      Other Objective/Functional Measures: see goals     Pain Level (0-10 scale) post treatment: 2    ASSESSMENT/Changes in Function: Pt has progress toward goals. Pt reports that he is not able to sleep comfortably at night due to his knee pt. Pt reports that when he is sitting he mostly keeps a pillow behind his knee, even after being reminded not to when he is in therapy. Pt has made little progress with Knee strength. Pt continues to remain limited with L knee AROM 12-93 deg. Pt does not tolerate manual well due to increase in hamstring pain and tightness. Patient will continue to benefit from skilled PT services to modify and progress therapeutic interventions, address functional mobility deficits, address ROM deficits, address strength deficits and analyze and address soft tissue restrictions to attain remaining goals. []  See Plan of Care  [x]  See progress note/recertification  []  See Discharge Summary         Progress towards goals / Updated goals:  Short Term Goals: To be accomplished in 2 weeks:  1. Pt will be independent and complaint w/ progressed HEP to progress gains in PT. At [facility]: reviewed, progressed, and initiated HEP  Current: Met, Performance reported as prescribed, 1/18/2018  2. Pt will have AROM L knee to > or = to 3-105 deg for ease w/ return to normal ADLs.    At [facility]: AROM L knee = 9-93 deg  Current: Progressing, L Knee AROM 0-8-96 deg, 1/18/2018  Long Term Goals: To be accomplished in 8 weeks:  1. Pt will improve FOTO to > or = t 60 to demo improved function. At eval: FOTO = 32  Current: Progressing, FOTO = 42, 1/24/2018  2. Pt will increase AROM L knee to > or = to 3-120 deg for ease w/ normalizing gait. At eval: AROM L knee = 9-93 deg  3. Pt will increase MMT L LE to > or = to 4-4+/5 grossly for ease w/ stair navigation. At eval: MMT L hip and knee grossly 3-3-/5 2' pain  Curent: Not met: MMT L knee (within available range) 4-/5 for knee ext and flex. 1/29/18  4. Pt will demo correct heel toe gait w/o AD for ease w/ walking around the grocery store. At eval: Pt presents w/ antalgic gait and decrease L knee bend w/ ambulation, SPC R hand  Current: Progressing: Pt is ambulates with no AD with antalgic gait.  1/22/18       PLAN  []  Upgrade activities as tolerated     [x]  Continue plan of care  []  Update interventions per flow sheet       []  Discharge due to:_  []  Other:_      Elin Smith PTA 1/29/2018  10:09 AM    Future Appointments  Date Time Provider Gonzalo Huerta   1/31/2018 10:00 AM Elin Smith, PTA [facility]   2/2/2018 12:00 PM Bang More PT [facility]

## 2018-01-29 NOTE — PROGRESS NOTES
[facility] - [facility]              [address]        [place], [address]   [phone] fax    Medicare Progress Report    Patient name: Marguerite Tesfaye Start of Care: 2018   Referral source: Rachana Villareal MD : 1945   Medical/Treatment Diagnosis: Left knee pain [M25.562] Onset Date:2017     Prior Hospitalization: see medical history Provider#: [id number]   Medications: Verified on Patient Summary List     Comorbidities: allergies, BMI > 30, DM   Prior Level of Function: Hx of L knee pain, retired, walking w/o AD  Visits from Start of Care: 9    Missed Visits: 1    Reporting Period: 2018 to 2018    Subjective Reports:     Short Term Goals: To be accomplished in 2 weeks:  1. Pt will be independent and complaint w/ progressed HEP to progress gains in PT. At eval: reviewed, progressed, and initiated HEP  At PN: Met, Performance reported as prescribed  2. Pt will have AROM L knee to > or = to 3-105 deg for ease w/ return to normal ADLs. At eval: AROM L knee = 9-93 deg  At PN: Progressing, L Knee AROM 0-8-96 deg  Long Term Goals: To be accomplished in 8 weeks:  1. Pt will improve FOTO to > or = t 60 to demo improved function. At eval: FOTO = 32  At PN: Progressing, FOTO = 42  2. Pt will increase AROM L knee to > or = to 3-120 deg for ease w/ normalizing gait. At eval: AROM L knee = 9-93 deg  At PN: Progressing, L Knee AROM 0-8-96 deg  3. Pt will increase MMT L LE to > or = to 4-4+/5 grossly for ease w/ stair navigation. At eval: MMT L hip and knee grossly 3-3-/5 2' pain  At PN: Not met: MMT L knee (within available range) 4-/5 for knee ext and flex. 4. Pt will demo correct heel toe gait w/o AD for ease w/ walking around the grocery store. At eval: Pt presents w/ antalgic gait and decrease L knee bend w/ ambulation, SPC R hand  At PN: Progressing: Pt is ambulates with no AD with antalgic gait.     Key functional changes: See above goals. Assessment / Recommendations:    Pt has progress toward goals. Pt reports that he is not able to sleep comfortably at night due to his knee pt. Pt reports that when he is sitting he mostly keeps a pillow behind his knee, even after being reminded not to when he is in therapy. Pt has made little progress with Knee strength and continues to remain limited with L knee AROM 12-93 deg. Pt does not tolerate manual well due to increase in hamstring pain and tightness therefore limiting extent of performance within clinic      Patient will continue to benefit from skilled PT services to modify and progress therapeutic interventions, address functional mobility deficits, address ROM deficits, address strength deficits and analyze and address soft tissue restrictions to attain remaining goals. Problem List: pain affecting function, decrease ROM, decrease strength, edema affecting function, impaired gait/ balance, decrease ADL/ functional abilitiies, decrease activity tolerance, decrease flexibility/ joint mobility and decrease transfer abilities   Treatment Plan: Therapeutic exercise, Therapeutic activities, Neuromuscular re-education, Physical agent/modality, Gait/balance training, Manual therapy, Patient education, Self Care training, Functional mobility training, Home safety training and Stair training    Updated Goals: Continue with unmet goals above. Frequency / Duration: Patient to be seen 3 times per week for 4 weeks:    G-Codes (GP)  Mobility  [id number] Current  CK= 40-59%   Goal  CK= 40-59%    The severity rating is based on clinical judgment and the FOTO score.       Rosalino Cagle, PT 1/29/2018 11:34 AM

## 2018-01-31 ENCOUNTER — HOSPITAL ENCOUNTER (OUTPATIENT)
Dept: PHYSICAL THERAPY | Age: 73
Discharge: HOME | End: 2018-01-31
Payer: MEDICARE

## 2018-01-31 PROCEDURE — 97110 THERAPEUTIC EXERCISES: CPT

## 2018-01-31 PROCEDURE — 97140 MANUAL THERAPY 1/> REGIONS: CPT

## 2018-01-31 NOTE — PROGRESS NOTES
PT DAILY TREATMENT NOTE - [facility]     Patient Name: Tee Gordon. Date:2018  : 1945  [x]  Patient  Verified  Payor: Krystyna Barber / Plan: Maegan Blanton / Product Type: Managed Care Medicare /    In time:9:52  Out time:10:49  Total Treatment Time (min): 57  Total Timed Codes (min): 47  1:1 Treatment Time ( [address] only): 38   Visit #: 1 of 12 (new PN)    Treatment Area: Left knee pain [M25.562]    SUBJECTIVE  Pain Level (0-10 scale): 2  Any medication changes, allergies to medications, adverse drug reactions, diagnosis change, or new procedure performed?: [x] No    [] Yes (see summary sheet for update)  Subjective functional status/changes:   [] No changes reported  Pt reports he is going to his follow up appointment with his doctor today. OBJECTIVE    Modality rationale: decrease pain to improve the patients ability to decrease difficulty while performing tasks.     Min Type Additional Details    [] Estim:  []Unatt       []IFC  []Premod                        []Other:  []w/ice   []w/heat  Position:  Location:    [] Estim: []Att    []TENS instruct  []NMES                    []Other:  []w/US   []w/ice   []w/heat  Position:  Location:    []  Traction: [] Cervical       []Lumbar                       [] Prone          []Supine                       []Intermittent   []Continuous Lbs:  [] before manual  [] after manual    []  Ultrasound: []Continuous   [] Pulsed                           []1MHz   []3MHz W/cm2:  Location:    []  Iontophoresis with dexamethasone         Location: [] Take home patch   [] In clinic   10 [x]  Ice     []  heat  []  Ice massage  []  Laser   []  Anodyne Position:sitting  Location:    []  Laser with stim  []  Other:  Position:  Location:    []  Vasopneumatic Device Pressure:       [] lo [] med [] hi   Temperature: [] lo [] med [] hi   [] Skin assessment post-treatment:  []intact []redness- no adverse reaction    []redness - adverse reaction:      37 min Therapeutic Exercise:  [x] See flow sheet :   Rationale: increase ROM and increase strength to improve the patients ability to increase tolerance to activities.             10 min Manual Therapy:   Knee flexion and ext mobs, patellar mobs, popliteal release   Rationale: decrease pain, increase ROM, increase tissue extensibility and decrease trigger points to increase tolerance to activities. With   [] TE   [] TA   [] neuro   [] other: Patient Education: [x] Review HEP    [] Progressed/Changed HEP based on:   [] positioning   [] body mechanics   [] transfers   [] heat/ice application    [] other:      Other Objective/Functional Measures: L AROM 2-94 deg after manual.      Pain Level (0-10 scale) post treatment: 2    ASSESSMENT/Changes in Function: Continue to progress pt as tolerated. Patient will continue to benefit from skilled PT services to modify and progress therapeutic interventions, address functional mobility deficits, address ROM deficits, address strength deficits and analyze and address soft tissue restrictions to attain remaining goals. []  See Plan of Care  []  See progress note/recertification  []  See Discharge Summary         Progress towards goals / Updated goals:    Long Term Goals: To be accomplished in 8 weeks:  1. Pt will improve FOTO to > or = t 60 to demo improved function. At PN:  FOTO = 42, 1/24/2018  2. Pt will increase AROM L knee to > or = to 3-120 deg for ease w/ normalizing gait. At PN:  L Knee AROM 0-8-96 deg, 1/18/2018  3. Pt will increase MMT L LE to > or = to 4-4+/5 grossly for ease w/ stair navigation. At PN:  MMT L knee (within available range) 4-/5 for knee ext and flex. 1/29/18  4. Pt will demo correct heel toe gait w/o AD for ease w/ walking around the grocery store. At PN: Pt is ambulates with no AD with antalgic gait. 1/22/18    Goals Met:    1. Pt will be independent and complaint w/ progressed HEP to progress gains in PT.    At eval: reviewed, progressed, and initiated HEP  Current: Met, Performance reported as prescribed, 1/18/2018       PLAN  []  Upgrade activities as tolerated     [x]  Continue plan of care  []  Update interventions per flow sheet       []  Discharge due to:_  []  Other:_      Kirill Burnett, VIOLETTE 1/31/2018  10:05 AM    Future Appointments  Date Time Provider Gonzalo Huerta   2/2/2018 12:00 PM Andrews Points, PT [facility]

## 2018-02-01 ENCOUNTER — APPOINTMENT (OUTPATIENT)
Dept: PHYSICAL THERAPY | Age: 73
End: 2018-02-01
Payer: MEDICARE

## 2018-02-02 ENCOUNTER — HOSPITAL ENCOUNTER (OUTPATIENT)
Dept: PHYSICAL THERAPY | Age: 73
Discharge: HOME | End: 2018-02-02
Payer: MEDICARE

## 2018-02-02 PROCEDURE — 97140 MANUAL THERAPY 1/> REGIONS: CPT | Performed by: PHYSICAL THERAPIST

## 2018-02-02 PROCEDURE — 97110 THERAPEUTIC EXERCISES: CPT | Performed by: PHYSICAL THERAPIST

## 2018-02-02 NOTE — PROGRESS NOTES
PT DAILY TREATMENT NOTE - [facility]     Patient Name: Carin Batista.   Date:2018  : 1945  [x]  Patient  Verified  Payor: Yoanna Ballard / Plan: Zhang Graff / Product Type: Managed Care Medicare /    In BBXO:1841  Out time:1253  Total Treatment Time (min): 56  Total Timed Codes (min): 46  1:1 Treatment Time ( [address] only): 44  Visit #: 2 of 12    Treatment Area: Left knee pain [M25.562]    SUBJECTIVE  Pain Level (0-10 scale): 2/10  Any medication changes, allergies to medications, adverse drug reactions, diagnosis change, or new procedure performed?: [x] No    [] Yes (see summary sheet for update)  Subjective functional status/changes:   [] No changes reported  Pt reports he had a lot of pain yesterday but today it's better    OBJECTIVE    Modality rationale: decrease edema, decrease inflammation and decrease pain to improve the patients ability to improve gait and activity tolerance    Min Type Additional Details    [] Estim:  []Unatt       []IFC  []Premod                        []Other:  []w/ice   []w/heat  Position:  Location:    [] Estim: []Att    []TENS instruct  []NMES                    []Other:  []w/US   []w/ice   []w/heat  Position:  Location:    []  Traction: [] Cervical       []Lumbar                       [] Prone          []Supine                       []Intermittent   []Continuous Lbs:  [] before manual  [] after manual    []  Ultrasound: []Continuous   [] Pulsed                           []1MHz   []3MHz W/cm2:  Location:    []  Iontophoresis with dexamethasone         Location: [] Take home patch   [] In clinic   10 [x]  Ice     []  heat  []  Ice massage  []  Laser   []  Anodyne Position: long sitting  Location: L knee    []  Laser with stim  []  Other:  Position:  Location:    []  Vasopneumatic Device Pressure:       [] lo [] med [] hi   Temperature: [] lo [] med [] hi   [] Skin assessment post-treatment:  []intact []redness- no adverse reaction    []redness - adverse reaction:      min []Eval                  []Re-Eval       36 min Therapeutic Exercise:  [x] See flow sheet : increased per flow sheet   Rationale: increase ROM, increase strength, improve coordination, improve balance and increase proprioception to improve the patients ability to improve gait and activity tolerance      min Therapeutic Activity:  []  See flow sheet :   Rationale:   to improve the patients ability to       min Neuromuscular Re-education:  []  See flow sheet :   Rationale:   to improve the patients ability to     10 min Manual Therapy:  Knee flexion and ext mobs, patellar mobs, popliteal release    Rationale: decrease pain, increase ROM, increase tissue extensibility and decrease edema  to improve activity tolerance and mobility      min Gait Training:  ___ feet with ___ device on level surfaces with ___ level of assist   Rationale: With   [] TE   [] TA   [] neuro   [] other: Patient Education: [x] Review HEP    [] Progressed/Changed HEP based on:   [] positioning   [] body mechanics   [] transfers   [] heat/ice application    [] other:      Other Objective/Functional Measures:      Pain Level (0-10 scale) post treatment: 3/10    ASSESSMENT/Changes in Function: Discussed benefits of manipulation if that's what MD recommends. PT declined manual for extension today stating he wanted to focus on bending since it's regressing and he feels he can work on extension at home. Continue to monitor ext and add manual back as necessary. Patient will continue to benefit from skilled PT services to modify and progress therapeutic interventions, address functional mobility deficits, address ROM deficits, address strength deficits, analyze and address soft tissue restrictions, analyze and cue movement patterns, analyze and modify body mechanics/ergonomics, address imbalance/dizziness and instruct in home and community integration to attain remaining goals.      []  See Plan of Care  []  See progress note/recertification  []  See Discharge Summary         Progress towards goals / Updated goals:  Long Term Goals: To be accomplished in 8 weeks:  1. Pt will improve FOTO to > or = t 60 to demo improved function. At PN:  FOTO = 42, 1/24/2018  2. Pt will increase AROM L knee to > or = to 3-120 deg for ease w/ normalizing gait. At PN:  L Knee AROM 0-8-96 deg, 1/18/2018  Current: progressing, L knee flex 98 deg today following manual 2/2/18  3. Pt will increase MMT L LE to > or = to 4-4+/5 grossly for ease w/ stair navigation. At PN:  MMT L knee (within available range) 4-/5 for knee ext and flex. 1/29/18  4. Pt will demo correct heel toe gait w/o AD for ease w/ walking around the grocery store. At PN: Pt is ambulates with no AD with antalgic gait. 1/22/18     Goals Met:    1. Pt will be independent and complaint w/ progressed HEP to progress gains in PT.    At eval: reviewed, progressed, and initiated HEP  Current: Met, Performance reported as prescribed, 1/18/2018    PLAN  [x]  Upgrade activities as tolerated     [x]  Continue plan of care  []  Update interventions per flow sheet       []  Discharge due to:_  []  Other:_      Mello Paredes, PT 2/2/2018  12:18 PM    Future Appointments  Date Time Provider Gonzalo Huerta   2/6/2018 1:30 PM Kizzy Blakely PT [facility]   2/7/2018 2:30 PM [facility] [place] [facility]   2/9/2018 8:30 AM Amaya Oscar [facility]   2/13/2018 10:00 AM Amaya Oscar [facility]   2/14/2018 11:30 AM [facility] PT [place] [facility]   2/16/2018 11:30 AM Melda Alias, PTA [facility]   2/19/2018 2:00 PM [facility] PT [place] [facility]   2/21/2018 10:30 AM [facility] PT [place] [facility]   2/23/2018 12:00 PM Gary Azevedo, PTA [facility]   2/26/2018 11:30 AM Gary Azevedo, PTA [facility]   2/28/2018 11:30 AM Gary Azevedo, PTA [facility]   3/2/2018 11:30 AM Gary Azevedo, PTA [facility]

## 2018-02-05 ENCOUNTER — HOSPITAL ENCOUNTER (OUTPATIENT)
Dept: PHYSICAL THERAPY | Age: 73
Discharge: HOME | End: 2018-02-05
Payer: MEDICARE

## 2018-02-05 PROCEDURE — 97110 THERAPEUTIC EXERCISES: CPT

## 2018-02-05 PROCEDURE — 97140 MANUAL THERAPY 1/> REGIONS: CPT

## 2018-02-05 NOTE — PROGRESS NOTES
PT DAILY TREATMENT NOTE - [facility]     Patient Name: Tuyet Davis. Date:2018  : 1945  [x]  Patient  Verified  Payor: Kayode Jones / Plan: Saba Hutchinson / Product Type: Managed Care Medicare /    In time:3:30  Out time:4:25  Total Treatment Time (min): 55  Total Timed Codes (min): 45  1:1 Treatment Time ( [address] only): 30   Visit #: 3 of 12    Treatment Area: Left knee pain [M25.562]    SUBJECTIVE  Pain Level (0-10 scale): 10  Any medication changes, allergies to medications, adverse drug reactions, diagnosis change, or new procedure performed?: [x] No    [] Yes (see summary sheet for update)  Subjective functional status/changes:   [x] No changes reported      OBJECTIVE    Modality rationale: decrease inflammation and decrease pain to improve the patients ability to perform ADls without difficulty.    Min Type Additional Details    [] Estim:  []Unatt       []IFC  []Premod                        []Other:  []w/ice   []w/heat  Position:  Location:    [] Estim: []Att    []TENS instruct  []NMES                    []Other:  []w/US   []w/ice   []w/heat  Position:  Location:    []  Traction: [] Cervical       []Lumbar                       [] Prone          []Supine                       []Intermittent   []Continuous Lbs:  [] before manual  [] after manual    []  Ultrasound: []Continuous   [] Pulsed                           []1MHz   []3MHz W/cm2:  Location:    []  Iontophoresis with dexamethasone         Location: [] Take home patch   [] In clinic   10 [x]  Ice     []  heat  []  Ice massage  []  Laser   []  Anodyne Position:reclined  Location:left knee    []  Laser with stim  []  Other:  Position:  Location:    []  Vasopneumatic Device Pressure:       [] lo [] med [] hi   Temperature: [] lo [] med [] hi   [x] Skin assessment post-treatment:  [x]intact [x]redness- no adverse reaction    []redness - adverse reaction:   35 min Therapeutic Exercise:  [] See flow sheet :   Rationale: increase ROM and increase strength to improve the patients ability to perform ADLs without difficulty. 10 min Manual Therapy:  Contract relax in seated position x4, PROM into flexion. Rationale: decrease pain, increase ROM and increase tissue extensibility to perform ADls without difficulty. With   [] TE   [] TA   [] neuro   [] other: Patient Education: [x] Review HEP    [] Progressed/Changed HEP based on:   [] positioning   [] body mechanics   [] transfers   [] heat/ice application    [] other:      Other Objective/Functional Measures: Left knee flexion 103 deg, post manual.     Pain Level (0-10 scale) post treatment: 2-3/10    ASSESSMENT/Changes in Function: Continued with ex. Per flow sheet. Pt reported some discomfort with ex. But was able to perform. Initiated contract relax with pt. Pt was able to stay relaxed during manual without increased discomfort. Patient will continue to benefit from skilled PT services to modify and progress therapeutic interventions, address functional mobility deficits, address ROM deficits, address strength deficits, analyze and address soft tissue restrictions and analyze and cue movement patterns to attain remaining goals. []  See Plan of Care  []  See progress note/recertification  []  See Discharge Summary         Progress towards goals / Updated goals:  Long Term Goals: To be accomplished in 8 weeks:  1. Pt will improve FOTO to > or = t 60 to demo improved function. At PN:  FOTO = 42, 1/24/2018  2. Pt will increase AROM L knee to > or = to 3-120 deg for ease w/ normalizing gait. At PN:  L Knee AROM 0-8-96 deg, 1/18/2018  Current: progressing, L knee flex 103 deg today following manual 2/5/18  3. Pt will increase MMT L LE to > or = to 4-4+/5 grossly for ease w/ stair navigation. At PN:  MMT L knee (within available range) 4-/5 for knee ext and flex. 1/29/18  4. Pt will demo correct heel toe gait w/o AD for ease w/ walking around the grocery store.    At PN: Pt is ambulates with no AD with antalgic gait.  1/22/18        PLAN  [x]  Upgrade activities as tolerated     [x]  Continue plan of care  []  Update interventions per flow sheet       []  Discharge due to:_  []  Other:_      Fei Raymundo PTA 2/5/2018  5:07 PM    Future Appointments  Date Time Provider Gonzalo Huerta   2/7/2018 2:30 PM Mainor Patron, PTA [facility]   2/9/2018 8:30 AM Hankinson Patron, PTA [facility]   2/12/2018 8:30 AM Israel Cantrell, PT [facility]   2/14/2018 11:30 AM Israel Cantrell, PT [facility]   2/16/2018 11:30 AM Divine Koenig, PT [facility]   2/19/2018 2:00 PM [facility] PT [place] [facility]   2/21/2018 10:30 AM [facility] PT [place] [facility]   2/23/2018 12:00 PM Mainor Patron, PTA [facility]   2/26/2018 11:30 AM Hankinson Patron, PTA [facility]   2/28/2018 11:30 AM Hankinson Patron, PTA [facility]   3/2/2018 11:30 AM Hankinson Patron, PTA [facility]

## 2018-02-06 ENCOUNTER — APPOINTMENT (OUTPATIENT)
Dept: PHYSICAL THERAPY | Age: 73
End: 2018-02-06
Payer: MEDICARE

## 2018-02-07 ENCOUNTER — HOSPITAL ENCOUNTER (OUTPATIENT)
Dept: PHYSICAL THERAPY | Age: 73
Discharge: HOME | End: 2018-02-07
Payer: MEDICARE

## 2018-02-07 PROCEDURE — 97016 VASOPNEUMATIC DEVICE THERAPY: CPT

## 2018-02-07 PROCEDURE — 97110 THERAPEUTIC EXERCISES: CPT

## 2018-02-07 PROCEDURE — 97140 MANUAL THERAPY 1/> REGIONS: CPT

## 2018-02-07 NOTE — PROGRESS NOTES
PT DAILY TREATMENT NOTE - [facility]     Patient Name: Lexus Moon. Date:2018  : 1945  [x]  Patient  Verified  Payor: Joel Bergman / Plan: Evonne Gordon / Product Type: Managed Care Medicare /    In time:2:30  Out time:3:25  Total Treatment Time (min): 55  Total Timed Codes (min): 45  1:1 Treatment Time ( [address] only): 30  Visit #: 4 of 12    Treatment Area: Left knee pain [M25.562]    SUBJECTIVE  Pain Level (0-10 scale): 2  Any medication changes, allergies to medications, adverse drug reactions, diagnosis change, or new procedure performed?: [x] No    [] Yes (see summary sheet for update)  Subjective functional status/changes:   [] No changes reported  Pt reports that he knee continues to have fluid in the joint. OBJECTIVE    Modality rationale: decrease pain to improve the patients ability to decrease difficulty while performing tasks.     Min Type Additional Details    [] Estim:  []Unatt       []IFC  []Premod                        []Other:  []w/ice   []w/heat  Position:  Location:    [] Estim: []Att    []TENS instruct  []NMES                    []Other:  []w/US   []w/ice   []w/heat  Position:  Location:    []  Traction: [] Cervical       []Lumbar                       [] Prone          []Supine                       []Intermittent   []Continuous Lbs:  [] before manual  [] after manual    []  Ultrasound: []Continuous   [] Pulsed                           []1MHz   []3MHz W/cm2:  Location:    []  Iontophoresis with dexamethasone         Location: [] Take home patch   [] In clinic   10 [x]  Ice     []  heat  []  Ice massage  []  Laser   []  Anodyne Position:sitting  Location:L knee    []  Laser with stim  []  Other:  Position:  Location:    []  Vasopneumatic Device Pressure:       [] lo [] med [] hi   Temperature: [] lo [] med [] hi   [] Skin assessment post-treatment:  []intact []redness- no adverse reaction    []redness - adverse reaction:       35 min Therapeutic Exercise:  [x] See flow sheet :   Rationale: increase ROM and increase strength to improve the patients ability to increase tolerance to activities.             10 min Manual Therapy:   Knee flexion and ext mobs, patellar mobs, popliteal release   Rationale: decrease pain, increase ROM, increase tissue extensibility and decrease trigger points to increase tolerance to activities. With   [] TE   [] TA   [] neuro   [] other: Patient Education: [x] Review HEP    [] Progressed/Changed HEP based on:   [] positioning   [] body mechanics   [] transfers   [] heat/ice application    [] other:      Other Objective/Functional Measures:      Pain Level (0-10 scale) post treatment: 2    ASSESSMENT/Changes in Function: Continue to increase pt as tolerated. Patient will continue to benefit from skilled PT services to modify and progress therapeutic interventions, address functional mobility deficits, address ROM deficits, address strength deficits and analyze and address soft tissue restrictions to attain remaining goals. []  See Plan of Care  []  See progress note/recertification  []  See Discharge Summary         Progress towards goals / Updated goals:  Long Term Goals: To be accomplished in 8 weeks:  1. Pt will improve FOTO to > or = t 60 to demo improved function. At PN:  FOTO = 42, 1/24/2018  2. Pt will increase AROM L knee to > or = to 3-120 deg for ease w/ normalizing gait. At PN:  L Knee AROM 0-8-96 deg, 1/18/2018  Current: progressing, L knee flex 103 deg today following manual 2/5/18  3. Pt will increase MMT L LE to > or = to 4-4+/5 grossly for ease w/ stair navigation. At PN:  MMT L knee (within available range) 4-/5 for knee ext and flex. 1/29/18  4. Pt will demo correct heel toe gait w/o AD for ease w/ walking around the grocery store. At PN: Pt is ambulates with no AD with antalgic gait. 1/22/18          Goals Met:    1. Pt will be independent and complaint w/ progressed HEP to progress gains in PT.    At eval: reviewed, progressed, and initiated HEP  Current: Met, Performance reported as prescribed, 1/18/2018     PLAN  []  Upgrade activities as tolerated     [x]  Continue plan of care  []  Update interventions per flow sheet       []  Discharge due to:_  []  Other:_      Mainor Patron, PTA 2/7/2018  2:43 PM    Future Appointments  Date Time Provider Gonzalo Huerta   2/9/2018 8:30 AM Morris Patron, PTA [facility]   2/12/2018 8:30 AM Israel Cantrell, PT [facility]   2/14/2018 11:30 AM Israel Cantrell, PT [facility]   2/16/2018 11:30 AM Divine Koenig, PT [facility]   2/19/2018 2:00 PM [facility] [place] [facility]   2/21/2018 10:30 AM [facility] PT [place] [facility]   2/23/2018 12:00 PM Mainor Patron, PTA [facility]   2/26/2018 11:30 AM Morris Patron, [facility]   2/28/2018 11:30 AM Morris Patron, [facility]   3/2/2018 11:30 AM Morris Patron, PTA [facility]

## 2018-02-09 ENCOUNTER — HOSPITAL ENCOUNTER (OUTPATIENT)
Dept: PHYSICAL THERAPY | Age: 73
Discharge: HOME | End: 2018-02-09
Payer: MEDICARE

## 2018-02-09 PROCEDURE — 97140 MANUAL THERAPY 1/> REGIONS: CPT

## 2018-02-09 PROCEDURE — 97110 THERAPEUTIC EXERCISES: CPT

## 2018-02-09 NOTE — PROGRESS NOTES
PT DAILY TREATMENT NOTE - [facility]     Patient Name: Stas Mora. Date:2018  : 1945  [x]  Patient  Verified  Payor: Mai Hobson / Plan: Cathryn Ahumada / Product Type: Managed Care Medicare /    In time:8:29  Out time:9:29  Total Treatment Time (min): 60  Total Timed Codes (min): 50  1:1 Treatment Time ( [address] only): 35   Visit #: 5 of 12    Treatment Area: Left knee pain [M25.562]    SUBJECTIVE  Pain Level (0-10 scale): 4  Any medication changes, allergies to medications, adverse drug reactions, diagnosis change, or new procedure performed?: [x] No    [] Yes (see summary sheet for update)  Subjective functional status/changes:   [] No changes reported  Pt reports he was not able to sleep much last night because he was in so much pain. OBJECTIVE    Modality rationale: decrease pain to improve the patients ability to decrease difficulty while performing tasks.     Min Type Additional Details    [] Estim:  []Unatt       []IFC  []Premod                        []Other:  []w/ice   []w/heat  Position:  Location:    [] Estim: []Att    []TENS instruct  []NMES                    []Other:  []w/US   []w/ice   []w/heat  Position:  Location:    []  Traction: [] Cervical       []Lumbar                       [] Prone          []Supine                       []Intermittent   []Continuous Lbs:  [] before manual  [] after manual    []  Ultrasound: []Continuous   [] Pulsed                           []1MHz   []3MHz W/cm2:  Location:    []  Iontophoresis with dexamethasone         Location: [] Take home patch   [] In clinic   10 [x]  Ice     []  heat  []  Ice massage  []  Laser   []  Anodyne Position:sitting   Location:L knee    []  Laser with stim  []  Other:  Position:  Location:    []  Vasopneumatic Device Pressure:       [] lo [] med [] hi   Temperature: [] lo [] med [] hi   [] Skin assessment post-treatment:  []intact []redness- no adverse reaction    []redness - adverse reaction:      37 min Therapeutic Exercise:  [x] See flow sheet :   Rationale: increase ROM and increase strength to improve the patients ability to increase tolerance to activities.             13 min Manual Therapy:   Knee flexion and ext mobs, patellar mobs, popliteal release   Rationale: decrease pain, increase ROM, increase tissue extensibility and decrease trigger points to increase tolerance to activities.             With   [] TE   [] TA   [] neuro   [] other: Patient Education: [x] Review HEP    [] Progressed/Changed HEP based on:   [] positioning   [] body mechanics   [] transfers   [] heat/ice application    [] other:      Other Objective/Functional Measures: pre manual 15-87 deg; after manual 12-93 deg     Pain Level (0-10 scale) post treatment: 4    ASSESSMENT/Changes in Function: Continue to progress pt as tolerated. Pt expressed that he was getting very discouraged with therapy and stating that he wanted to quit therapy because he feels he is not making progressed. Encouraged pt to continue with therapy and to continue performing HEP. Patient will continue to benefit from skilled PT services to modify and progress therapeutic interventions, address functional mobility deficits, address ROM deficits, address strength deficits and analyze and address soft tissue restrictions to attain remaining goals. []  See Plan of Care  []  See progress note/recertification  []  See Discharge Summary         Progress towards goals / Updated goals:  Long Term Goals: To be accomplished in 8 weeks:  1. Pt will improve FOTO to > or = t 60 to demo improved function. At PN:  FOTO = 42, 1/24/2018  2. Pt will increase AROM L knee to > or = to 3-120 deg for ease w/ normalizing gait. At PN:  L Knee AROM 0-8-96 deg, 1/18/2018  Current: progressing, L knee flex 103 deg today following manual 2/5/18  3. Pt will increase MMT L LE to > or = to 4-4+/5 grossly for ease w/ stair navigation.    At PN:  MMT L knee (within available range) 4-/5 for knee ext and flex. 1/29/18  4. Pt will demo correct heel toe gait w/o AD for ease w/ walking around the grocery store. At PN: Pt is ambulates with no AD with antalgic gait. 1/22/18          Goals Met:    1. Pt will be independent and complaint w/ progressed HEP to progress gains in PT.    At eval: reviewed, progressed, and initiated HEP  Current: Met, Performance reported as prescribed, 1/18/2018    PLAN  []  Upgrade activities as tolerated     [x]  Continue plan of care  []  Update interventions per flow sheet       []  Discharge due to:_  []  Other:_      Mary Dee PTA 2/9/2018  8:28 AM    Future Appointments  Date Time Provider Gonzalo Huerta   2/9/2018 8:30 AM Mary Dee PTA [facility]   2/12/2018 8:30 AM Josefa Madrid, PT [facility]   2/14/2018 11:30 AM Josefa Madrid PT [facility]   2/16/2018 11:30 AM Toro Flores PT [facility]   2/19/2018 2:00 PM [facility] PT [place] [facility]   2/21/2018 10:30 AM [facility] PT [place] [facility]   2/23/2018 12:00 PM Mary Dee [facility]   2/26/2018 11:30 AM Mary Dee PTA [facility]   2/28/2018 11:30 AM Mary Dee PTA [facility]   3/2/2018 11:30 AM Mary Dee PTA [facility]

## 2018-02-12 ENCOUNTER — HOSPITAL ENCOUNTER (OUTPATIENT)
Dept: PHYSICAL THERAPY | Age: 73
Discharge: HOME | End: 2018-02-12
Payer: MEDICARE

## 2018-02-12 PROCEDURE — 97140 MANUAL THERAPY 1/> REGIONS: CPT

## 2018-02-12 PROCEDURE — 97110 THERAPEUTIC EXERCISES: CPT

## 2018-02-12 NOTE — PROGRESS NOTES
PT DAILY TREATMENT NOTE - [facility]     Patient Name: Margarita Duke. Date:2018  : 1945  [x]  Patient  Verified  Payor: Collette Silva / Plan: Marcia Dunn / Product Type: Managed Care Medicare /    In ITZE:[id number]  Out time:09  Total Treatment Time (min): 60  Total Timed Codes (min): 50  1:1 Treatment Time ( [address] only): 30   Visit #: 6 of 12    Treatment Area: Left knee pain [M25.562]    SUBJECTIVE  Pain Level (0-10 scale): 3  Any medication changes, allergies to medications, adverse drug reactions, diagnosis change, or new procedure performed?: [x] No    [] Yes (see summary sheet for update)  Subjective functional status/changes:   [] No changes reported  Patient reports next follow up with MD in the beginning of March with planned scheduling of a manipulation if continued AROM limitations demonstrated.     OBJECTIVE    Modality rationale: decrease inflammation and decrease pain to improve the patients ability to improve ease with sleep   Min Type Additional Details   10 X  Ice     -  heat  -  Ice massage Position: Reclined  Location: L Knee     42 min Therapeutic Exercise:  [x] See flow sheet : Emphasis placed on improving available knee AROM and strength and improving weightbearing functional mobility   Rationale: increase ROM and increase strength to improve the patients ability to improve ease with prolonged ambulation      8 min Manual Therapy:    Supine, L Femur AP Grade II Mobilization (TKE)  Supine, L Hamstring STM (90-90 position)'  Supine, L Hamstring Manual Stretch (90-90 Position)  Supine, L Hamstring Contract-Relax (90-90 position)   Rationale: decrease pain, increase ROM and increase tissue extensibility to improve ease with transfers          With   [] TE   [] TA   [] neuro   [] other: Patient Education: [x] Review HEP    [] Progressed/Changed HEP based on:   [] positioning   [] body mechanics   [] transfers   [] heat/ice application    [] other:      Other Objective/Functional Measures:     Pre-Manual Knee AROM: 0 - 15 - 90  Post-Manual Knee AROM: 0 - 12 - 95     Pain Level (0-10 scale) post treatment: 3-4    ASSESSMENT/Changes in Function: Patient with continued poor tolerance to completion of joint mobilizations directed at improving available L knee AROM with verbal cuing required to reduce muscular hypertonicity. Discussed with patient benefit of utilizing above-knee compression stockings, with below-knee stockings utilized at present, for swelling management. ,    Patient will continue to benefit from skilled PT services to modify and progress therapeutic interventions, address functional mobility deficits, address ROM deficits and address strength deficits to attain remaining goals. []  See Plan of Care  []  See progress note/recertification  []  See Discharge Summary         Progress towards goals / Updated goals:    Long Term Goals: To be accomplished in 8 weeks:  1. Pt will improve FOTO to > or = t 60 to demo improved function. At PN:  FOTO = 42, 1/24/2018  2. Pt will increase AROM L knee to > or = to 3-120 deg for ease w/ normalizing gait. At PN:  L Knee AROM 0-8-96 deg, 1/18/2018  Current: progressing, L knee flex 103 deg today following manual 2/5/18  3. Pt will increase MMT L LE to > or = to 4-4+/5 grossly for ease w/ stair navigation. At PN:  MMT L knee (within available range) 4-/5 for knee ext and flex. 1/29/18  4. Pt will demo correct heel toe gait w/o AD for ease w/ walking around the grocery store. At PN: Pt is ambulates with no AD with antalgic gait. 1/22/18  Current: Remains, Antalgic L gait pattern with maintenance of L knee flexion throughout gait and decreased L push-off, 2/12/2018          Goals Met:    1. Pt will be independent and complaint w/ progressed HEP to progress gains in PT.    At eval: reviewed, progressed, and initiated HEP  Current: Met, Performance reported as prescribed, 1/18/2018    PLAN  [x]  Upgrade activities as tolerated     [x]  Continue plan of care  []  Update interventions per flow sheet       []  Discharge due to:_  []  Other:_      Rosalino Cagle, PT 2/12/2018  6:35 AM    Future Appointments  Date Time Provider Gonzalo Huerta   2/12/2018 8:30 AM Rosalino Cagle, PT [facility]   2/14/2018 11:30 AM Rosalino Cagle, PT [facility]   2/16/2018 11:30 AM Pily Swan PT [facility]   2/19/2018 2:00 PM [facility] PT [place] [facility]   2/21/2018 10:30 AM [facility] PT [place] [facility]   2/23/2018 12:00 PM Chris Ley PTA [facility]   2/26/2018 11:30 AM Chris Ley PTA [facility]   2/28/2018 11:30 AM Chris Ley PTA [facility]   3/2/2018 11:30 AM Chris Ley PTA [facility]

## 2018-02-13 ENCOUNTER — APPOINTMENT (OUTPATIENT)
Dept: PHYSICAL THERAPY | Age: 73
End: 2018-02-13
Payer: MEDICARE

## 2018-02-14 ENCOUNTER — HOSPITAL ENCOUNTER (OUTPATIENT)
Dept: PHYSICAL THERAPY | Age: 73
Discharge: HOME | End: 2018-02-14
Payer: MEDICARE

## 2018-02-14 PROCEDURE — 97140 MANUAL THERAPY 1/> REGIONS: CPT

## 2018-02-14 PROCEDURE — 97110 THERAPEUTIC EXERCISES: CPT

## 2018-02-14 NOTE — PROGRESS NOTES
PT DAILY TREATMENT NOTE - [facility]     Patient Name: Carolyne Klinefelter. Date:2018  : 1945  [x]  Patient  Verified  Payor: Olvin Cardenas / Plan: Lissette Singh / Product Type: Managed Care Medicare /    In time:11:24  Out time:12:28  Total Treatment Time (min): 64  Total Timed Codes (min): 54  1:1 Treatment Time ( [address] only): 40   Visit #: 7 of 12    Treatment Area: Left knee pain [M25.562]    SUBJECTIVE  Pain Level (0-10 scale): 4  Any medication changes, allergies to medications, adverse drug reactions, diagnosis change, or new procedure performed?: [x] No    [] Yes (see summary sheet for update)  Subjective functional status/changes:   [] No changes reported  Pt reports that he works hard on bending his knee while in the shower. OBJECTIVE    Modality rationale: decrease pain to improve the patients ability to decrease difficulty while performing tasks.     Min Type Additional Details    [] Estim:  []Unatt       []IFC  []Premod                        []Other:  []w/ice   []w/heat  Position:  Location:    [] Estim: []Att    []TENS instruct  []NMES                    []Other:  []w/US   []w/ice   []w/heat  Position:  Location:    []  Traction: [] Cervical       []Lumbar                       [] Prone          []Supine                       []Intermittent   []Continuous Lbs:  [] before manual  [] after manual    []  Ultrasound: []Continuous   [] Pulsed                           []1MHz   []3MHz W/cm2:  Location:    []  Iontophoresis with dexamethasone         Location: [] Take home patch   [] In clinic   10 [x]  Ice     []  heat  []  Ice massage  []  Laser   []  Anodyne Position:sitting  Location:L knee    []  Laser with stim  []  Other:  Position:  Location:    []  Vasopneumatic Device Pressure:       [] lo [] med [] hi   Temperature: [] lo [] med [] hi   [] Skin assessment post-treatment:  []intact []redness- no adverse reaction    []redness - adverse reaction:     44 min Therapeutic Exercise:  [x] See flow sheet :   Rationale: increase ROM and increase strength to improve the patients ability to increase tolerance to activities.             10 min Manual Therapy:   Knee flexion and ext mobs, patellar mobs, popliteal release   Rationale: decrease pain, increase ROM, increase tissue extensibility and decrease trigger points to increase tolerance to activities. With   [] TE   [] TA   [] neuro   [] other: Patient Education: [x] Review HEP    [] Progressed/Changed HEP based on:   [] positioning   [] body mechanics   [] transfers   [] heat/ice application    [] other:      Other Objective/Functional Measures: pre manual 13-94 deg. After manual 9-100 deg     Pain Level (0-10 scale) post treatment: 2-3    ASSESSMENT/Changes in Function: Continue to progress pt as tolerated. Patient will continue to benefit from skilled PT services to modify and progress therapeutic interventions, address functional mobility deficits, address ROM deficits, address strength deficits and analyze and address soft tissue restrictions to attain remaining goals. []  See Plan of Care  []  See progress note/recertification  []  See Discharge Summary         Progress towards goals / Updated goals:     Long Term Goals: To be accomplished in 8 weeks:  1. Pt will improve FOTO to > or = t 60 to demo improved function. At PN:  FOTO = 42, 1/24/2018  2. Pt will increase AROM L knee to > or = to 3-120 deg for ease w/ normalizing gait. At PN:  L Knee AROM 0-8-96 deg, 1/18/2018  Current: progressing, L knee flex 103 deg today following manual 2/5/18  3. Pt will increase MMT L LE to > or = to 4-4+/5 grossly for ease w/ stair navigation. At PN:  MMT L knee (within available range) 4-/5 for knee ext and flex. 1/29/18  4. Pt will demo correct heel toe gait w/o AD for ease w/ walking around the grocery store. At PN: Pt is ambulates with no AD with antalgic gait.  1/22/18  Current: Remains, Antalgic L gait pattern with maintenance of L knee flexion throughout gait and decreased L push-off, 2/12/2018          Goals Met:    1. Pt will be independent and complaint w/ progressed HEP to progress gains in PT.    At al: reviewed, progressed, and initiated HEP  Current: Met, Performance reported as prescribed, 1/18/2018    PLAN  []  Upgrade activities as tolerated     [x]  Continue plan of care  []  Update interventions per flow sheet       []  Discharge due to:_  []  Other:_      Sis Byers, PTA 2/14/2018  11:28 AM    Future Appointments  Date Time Provider Gonzalo Huerta   2/14/2018 11:30 AM Normemery Cuadrat, PTA [facility]   2/16/2018 11:30 AM Yina Murphy, PT [facility]   2/19/2018 2:00 PM [facility] PT [place] [facility]   2/21/2018 10:30 AM [facility] PT [place] [facility]   2/23/2018 12:00 PM Normie Halt, PTA [facility]   2/26/2018 11:30 AM Normie Halt, PTA [facility]   2/28/2018 11:30 AM Normie Halt, PTA [facility]   3/2/2018 11:30 AM Normie Halt, PTA [facility]

## 2018-02-16 ENCOUNTER — HOSPITAL ENCOUNTER (OUTPATIENT)
Dept: PHYSICAL THERAPY | Age: 73
Discharge: HOME | End: 2018-02-16
Payer: MEDICARE

## 2018-02-16 PROCEDURE — 97140 MANUAL THERAPY 1/> REGIONS: CPT | Performed by: PHYSICAL THERAPIST

## 2018-02-16 PROCEDURE — 97110 THERAPEUTIC EXERCISES: CPT | Performed by: PHYSICAL THERAPIST

## 2018-02-16 NOTE — PROGRESS NOTES
PT DAILY TREATMENT NOTE - [facility]     Patient Name: Parmjit Portillo. Date:2018  : 1945  [x]  Patient  Verified  Payor: Marina Offer / Plan: Radha Duffy / Product Type: Managed Care Medicare /    In time:1125  Out time:1220  Total Treatment Time (min): 55  Total Timed Codes (min): 45  1:1 Treatment Time ( [address] only): 40   Visit #: 8 of 12    Treatment Area: Left knee pain [M25.562]    SUBJECTIVE  Pain Level (0-10 scale): 5-6/10  Any medication changes, allergies to medications, adverse drug reactions, diagnosis change, or new procedure performed?: [x] No    [] Yes (see summary sheet for update)  Subjective functional status/changes:   [] No changes reported  Pt reports his knee became swollen this morning for unknown reasons.  Reports he \"worked the hell out of it\" yesterday and walked a lot more than usual     OBJECTIVE    Modality rationale: decrease edema, decrease inflammation and decrease pain to improve the patients ability to improve gait and activity tolerance    Min Type Additional Details    [] Estim:  []Unatt       []IFC  []Premod                        []Other:  []w/ice   []w/heat  Position:  Location:    [] Estim: []Att    []TENS instruct  []NMES                    []Other:  []w/US   []w/ice   []w/heat  Position:  Location:    []  Traction: [] Cervical       []Lumbar                       [] Prone          []Supine                       []Intermittent   []Continuous Lbs:  [] before manual  [] after manual    []  Ultrasound: []Continuous   [] Pulsed                           []1MHz   []3MHz W/cm2:  Location:    []  Iontophoresis with dexamethasone         Location: [] Take home patch   [] In clinic   10 [x]  Ice     []  heat  []  Ice massage  []  Laser   []  Anodyne Position: long sitting  Location: L knee    []  Laser with stim  []  Other:  Position:  Location:    []  Vasopneumatic Device Pressure:       [] lo [] med [] hi   Temperature: [] lo [] med [] hi   [] Skin assessment post-treatment:  []intact []redness- no adverse reaction    []redness - adverse reaction:      min []Eval                  []Re-Eval       33 min Therapeutic Exercise:  [x] See flow sheet : increased per flow sheet   Rationale: increase ROM, increase strength, improve coordination and increase proprioception to improve the patients ability to improve activity tolerance and gait      min Therapeutic Activity:  []  See flow sheet :   Rationale:   to improve the patients ability to       min Neuromuscular Re-education:  []  See flow sheet :   Rationale:   to improve the patients ability to     12 min Manual Therapy:  Knee flexion and ext mobs, patellar mobs, popliteal release    Rationale: decrease pain, increase ROM, increase tissue extensibility and decrease trigger points to improve gait and activity tolerance      min Gait Training:  ___ feet with ___ device on level surfaces with ___ level of assist   Rationale: With   [] TE   [] TA   [] neuro   [] other: Patient Education: [x] Review HEP    [] Progressed/Changed HEP based on:   [] positioning   [] body mechanics   [] transfers   [] heat/ice application    [] other:      Other Objective/Functional Measures: AROM after manual 4-95 deg      Pain Level (0-10 scale) post treatment: 2-3/10    ASSESSMENT/Changes in Function: Pt w/ increased edema in the L knee decreasing ROM and causing firm end feel w/ manual.     Patient will continue to benefit from skilled PT services to modify and progress therapeutic interventions, address functional mobility deficits, address ROM deficits, address strength deficits, analyze and address soft tissue restrictions, analyze and cue movement patterns, analyze and modify body mechanics/ergonomics, address imbalance/dizziness and instruct in home and community integration to attain remaining goals.      []  See Plan of Care  []  See progress note/recertification  []  See Discharge Summary         Progress towards goals / Updated goals:  Long Term Goals: To be accomplished in 8 weeks:  1. Pt will improve FOTO to > or = t 60 to demo improved function. At PN:  FOTO = 42, 1/24/2018  2. Pt will increase AROM L knee to > or = to 3-120 deg for ease w/ normalizing gait. At PN:  L Knee AROM 0-8-96 deg, 1/18/2018  Current: progressing, L knee flex 103 deg today following manual 2/5/18  3. Pt will increase MMT L LE to > or = to 4-4+/5 grossly for ease w/ stair navigation. At PN:  MMT L knee (within available range) 4-/5 for knee ext and flex. 1/29/18  4. Pt will demo correct heel toe gait w/o AD for ease w/ walking around the grocery store. At PN: Pt is ambulates with no AD with antalgic gait. 1/22/18  Current: Remains, Antalgic L gait pattern with maintenance of L knee flexion throughout gait and decreased L push-off, 2/12/2018      Goals Met:    1. Pt will be independent and complaint w/ progressed HEP to progress gains in PT.    At eval: reviewed, progressed, and initiated HEP  Current: Met, Performance reported as prescribed, 1/18/2018       PLAN  [x]  Upgrade activities as tolerated     [x]  Continue plan of care  []  Update interventions per flow sheet       []  Discharge due to:_  []  Other:_      Kaci Modi, PT 2/16/2018  11:50 AM    Future Appointments  Date Time Provider Gonzalo Huerta   2/19/2018 2:00 PM [facility] PT [place] [id number] [facility]   2/21/2018 10:30 AM [facility] PT [place] [facility]   2/23/2018 12:00 PM Jose Hollingsworth PTA [facility]   2/26/2018 11:30 AM Jose Hollingsworth PTA [facility]   2/28/2018 11:30 AM Jose Hollingsworth PTA [facility]   3/2/2018 11:30 AM Jose Hollingsworth PTA [facility]

## 2018-02-19 ENCOUNTER — HOSPITAL ENCOUNTER (OUTPATIENT)
Dept: PHYSICAL THERAPY | Age: 73
Discharge: HOME | End: 2018-02-19
Payer: MEDICARE

## 2018-02-19 PROCEDURE — 97140 MANUAL THERAPY 1/> REGIONS: CPT

## 2018-02-19 PROCEDURE — 97110 THERAPEUTIC EXERCISES: CPT

## 2018-02-19 NOTE — PROGRESS NOTES
PT DAILY TREATMENT NOTE - [facility]     Patient Name: Zahira Smith. Date:2018  : 1945  [x]  Patient  Verified  Payor: Ana Rodriguez / Plan: Ave Arreola / Product Type: Managed Care Medicare /    In time:2:00  Out time:3:00  Total Treatment Time (min): 60  Total Timed Codes (min): 50  1:1 Treatment Time ( [address] only): 30   Visit #: 9 of 12    Treatment Area: Left knee pain [M25.562]    SUBJECTIVE  Pain Level (0-10 scale): 2-310  Any medication changes, allergies to medications, adverse drug reactions, diagnosis change, or new procedure performed?: [x] No    [] Yes (see summary sheet for update)  Subjective functional status/changes:   [x] No changes reported      OBJECTIVE    Modality rationale: decrease inflammation and decrease pain to improve the patients ability to perform ADls without difficulty.    Min Type Additional Details    [] Estim:  []Unatt       []IFC  []Premod                        []Other:  []w/ice   []w/heat  Position:  Location:    [] Estim: []Att    []TENS instruct  []NMES                    []Other:  []w/US   []w/ice   []w/heat  Position:  Location:    []  Traction: [] Cervical       []Lumbar                       [] Prone          []Supine                       []Intermittent   []Continuous Lbs:  [] before manual  [] after manual    []  Ultrasound: []Continuous   [] Pulsed                           []1MHz   []3MHz W/cm2:  Location:    []  Iontophoresis with dexamethasone         Location: [] Take home patch   [] In clinic   10 [x]  Ice     []  heat  []  Ice massage  []  Laser   []  Anodyne Position:reclined  Location:left knee    []  Laser with stim  []  Other:  Position:  Location:    []  Vasopneumatic Device Pressure:       [] lo [] med [] hi   Temperature: [] lo [] med [] hi   [x] Skin assessment post-treatment:  [x]intact []redness- no adverse reaction    []redness - adverse reaction:     40 min Therapeutic Exercise:  [] See flow sheet :   Rationale: increase ROM and increase strength to improve the patients ability to perform ADls without difficulty. 10 min Manual Therapy:  Contract relax in seated position x4, PROM into flexion. Rationale: decrease pain, increase ROM and increase tissue extensibility to perform ADls without difficulty. With   [] TE   [] TA   [] neuro   [] other: Patient Education: [x] Review HEP    [] Progressed/Changed HEP based on:   [] positioning   [] body mechanics   [] transfers   [] heat/ice application    [] other:      Other Objective/Functional Measures:      Pain Level (0-10 scale) post treatment: 2/10    ASSESSMENT/Changes in Function: Continued with current ex. Per flow sheet. Pt reported some discomfort with ex. But was able to perform. Patient will continue to benefit from skilled PT services to modify and progress therapeutic interventions, address functional mobility deficits, address ROM deficits, address strength deficits, analyze and address soft tissue restrictions and analyze and cue movement patterns to attain remaining goals. []  See Plan of Care  []  See progress note/recertification  []  See Discharge Summary         Progress towards goals / Updated goals:    Long Term Goals: To be accomplished in 8 weeks:  1. Pt will improve FOTO to > or = t 60 to demo improved function. At PN:  FOTO = 42, 1/24/2018  2. Pt will increase AROM L knee to > or = to 3-120 deg for ease w/ normalizing gait. At PN:  L Knee AROM 0-8-96 deg, 1/18/2018  Current: progressing, L knee flex 103 deg today following manual 2/5/18  3. Pt will increase MMT L LE to > or = to 4-4+/5 grossly for ease w/ stair navigation. At PN:  MMT L knee (within available range) 4-/5 for knee ext and flex. 1/29/18  4. Pt will demo correct heel toe gait w/o AD for ease w/ walking around the grocery store. At PN: Pt is ambulates with no AD with antalgic gait.  1/22/18  Current: Remains, Antalgic L gait pattern with maintenance of L knee flexion throughout gait and decreased L push-off, 2/12/2018  PLAN  [x]  Upgrade activities as tolerated     [x]  Continue plan of care  []  Update interventions per flow sheet       []  Discharge due to:_  []  Other:_      Estella Morris PTA 2/19/2018  2:10 PM    Future Appointments  Date Time Provider Gonzalo Huerta   2/21/2018 10:30 AM [facility] PT [place] [facility]   2/23/2018 12:00 PM Tammy Gilberto, PTA [facility]   2/26/2018 11:30 AM Tammy Gilberto, PTA [facility]   2/28/2018 11:30 AM Tammy Gilberto, PTA [facility]   3/2/2018 11:30 AM Tammy Gilberto, PTA [facility]

## 2018-02-21 ENCOUNTER — HOSPITAL ENCOUNTER (OUTPATIENT)
Dept: PHYSICAL THERAPY | Age: 73
Discharge: HOME | End: 2018-02-21
Payer: MEDICARE

## 2018-02-21 PROCEDURE — 97110 THERAPEUTIC EXERCISES: CPT

## 2018-02-21 PROCEDURE — 97140 MANUAL THERAPY 1/> REGIONS: CPT

## 2018-02-21 PROCEDURE — G8979 MOBILITY GOAL STATUS: HCPCS | Performed by: PHYSICAL THERAPIST

## 2018-02-21 PROCEDURE — G8978 MOBILITY CURRENT STATUS: HCPCS | Performed by: PHYSICAL THERAPIST

## 2018-02-21 NOTE — PROGRESS NOTES
PT DAILY TREATMENT NOTE - [facility]     Patient Name: Lexus Moon. Date:2018  : 1945  [x]  Patient  Verified  Payor: Joel Bergman / Plan: Evonne Gordon / Product Type: Managed Care Medicare /    In time:3:20  Out time:4:25  Total Treatment Time (min): 65  Total Timed Codes (min): 55  1:1 Treatment Time ( [address] only): 54   Visit #: 10 of 12    Treatment Area: Left knee pain [M25.562]    SUBJECTIVE  Pain Level (0-10 scale): 4-5/10  Any medication changes, allergies to medications, adverse drug reactions, diagnosis change, or new procedure performed?: [x] No    [] Yes (see summary sheet for update)  Subjective functional status/changes:   [] No changes reported  \"I didn't take my pain pills today. \" Pt reports 15-20% improvement in the past month. Pt reports walking has gotten better and he feels stronger. OBJECTIVE    Modality rationale: decrease inflammation and decrease pain to improve the patients ability to perform ADls without difficulty.    Min Type Additional Details    [] Estim:  []Unatt       []IFC  []Premod                        []Other:  []w/ice   []w/heat  Position:  Location:    [] Estim: []Att    []TENS instruct  []NMES                    []Other:  []w/US   []w/ice   []w/heat  Position:  Location:    []  Traction: [] Cervical       []Lumbar                       [] Prone          []Supine                       []Intermittent   []Continuous Lbs:  [] before manual  [] after manual    []  Ultrasound: []Continuous   [] Pulsed                           []1MHz   []3MHz W/cm2:  Location:    []  Iontophoresis with dexamethasone         Location: [] Take home patch   [] In clinic   10 [x]  Ice     []  heat  []  Ice massage  []  Laser   []  Anodyne Position: reclined  Location:left knee    []  Laser with stim  []  Other:  Position:  Location:    []  Vasopneumatic Device Pressure:       [] lo [] med [] hi   Temperature: [] lo [] med [] hi   [x] Skin assessment post-treatment: [x]intact [x]redness- no adverse reaction    []redness - adverse reaction:       40 min Therapeutic Exercise:  [] See flow sheet :   Rationale: increase ROM and increase strength to improve the patients ability to perform ADls without difficulty. 15 min Manual Therapy:  Contract relax in seated position x4, PROM into flexion. STM to left quad in seated position. Patellar mobes. Rationale: decrease pain, increase ROM and increase tissue extensibility to perform ADls without difficulty. With   [] TE   [] TA   [] neuro   [] other: Patient Education: [x] Review HEP    [] Progressed/Changed HEP based on:   [] positioning   [] body mechanics   [] transfers   [] heat/ice application    [] other:      Other Objective/Functional Measures: FOTO 45. L knee AROM 5- 105 deg today following manual. Left LE  4-/5 for knee flexion and extension. Pain Level (0-10 scale) post treatment: 2/10    ASSESSMENT/Changes in Function: Continued with current ex. Per flow sheet. Pt reported some discomfort with ex. But reported decrease pain during and after manual.    Patient will continue to benefit from skilled PT services to modify and progress therapeutic interventions, address functional mobility deficits, address ROM deficits, address strength deficits, analyze and address soft tissue restrictions, analyze and cue movement patterns and analyze and modify body mechanics/ergonomics to attain remaining goals. []  See Plan of Care  []  See progress note/recertification  []  See Discharge Summary         Progress towards goals / Updated goals:    Long Term Goals: To be accomplished in 8 weeks:  1. Pt will improve FOTO to > or = t 60 to demo improved function. At PN:  FOTO = 42, 1/24/2018  Current: Progressing. FOTO 45. 2/21/18  2. Pt will increase AROM L knee to > or = to 3-120 deg for ease w/ normalizing gait.    At PN:  L Knee AROM 0-8-96 deg, 1/18/2018  Current: NOT MET, L knee AROM 5- 105 deg today following manual 2/21/18  3. Pt will increase MMT L LE to > or = to 4-4+/5 grossly for ease w/ stair navigation. At PN:  MMT L knee (within available range) 4-/5 for knee ext and flex. 1/29/18  Current:MET. Left LE  4-/5 for knee flexion and extension. 2/21/18. 4. Pt will demo correct heel toe gait w/o AD for ease w/ walking around the grocery store. At PN: Pt is ambulates with no AD with antalgic gait.  1/22/18  Current: Remains, Antalgic L gait pattern with maintenance of L knee flexion throughout gait and decreased L push-off, 2/21/2018  PLAN  [x]  Upgrade activities as tolerated     [x]  Continue plan of care  []  Update interventions per flow sheet       []  Discharge due to:_  [x]  Other:_PN 2xs a week for 4 weeks      Ilene Rosen PTA 2/21/2018  3:28 PM    Future Appointments  Date Time Provider Gonzalo Huerta   2/21/2018 3:30 PM [facility]   2/23/2018 12:00 PM Fouzia Wylie PTA [facility]   2/26/2018 11:30 AM Fouzia Wylie PTA [facility]   2/28/2018 11:30 AM Fouzia Wylie PTA [facility]   3/2/2018 11:30 AM Fouzia Wylie PTA [facility]

## 2018-02-22 NOTE — PROGRESS NOTES
[facility] - [facility]              [address]        [place], [address] [place]   [phone] fax    Continued Plan of Care/ Re-certification for Physical Therapy Services       Patient name: Carline Friday Start of Care: 18   Referral source: Tee Faith MD : 1945   Medical/Treatment Diagnosis: Left knee pain [M25.562] Onset Date:17   Prior Hospitalization: see medical history Provider#: [id number]   Medications: Verified on Patient Summary List     Comorbidities: allergies, BMI > 30, DM   Prior Level of Function: Hx of L knee pain, retired, walking w/o AD  Visits from [place] of Care: 19                                                                                        Missed Visits: 0     Reporting Period: 18 to 18     Subjective Reports: Pt reports 15-20% improvement in the past month. Pt reports walking has gotten better and he feels stronger.     Progress towards goals / Updated goals:  Long Term Goals: To be accomplished in 8 weeks:  1. Pt will improve FOTO to > or = t 60 to demo improved function. At PN:  FOTO = 42  Current: Progressing. FOTO 45  2. Pt will increase AROM L knee to > or = to 3-120 deg for ease w/ normalizing gait. At PN:  L Knee AROM 0-8-96 deg  Current: Progressing, L knee AROM 5- 105 deg today following manual  3. Pt will increase MMT L LE to > or = to 4-4+/5 grossly for ease w/ stair navigation. At PN:  MMT L knee (within available range) 4-/5 for knee ext and flex  Current:MET. Left LE  4-/5 for knee flexion and extension  4. Pt will demo correct heel toe gait w/o AD for ease w/ walking around the grocery store.    At PN: Pt is ambulates with no AD with antalgic gait  Current: Remains, Antalgic L gait pattern with maintenance of L knee flexion throughout gait and decreased L push-off     Key functional changes: improved ROM and MMT       Problems/ barriers to goal attainment: decreased pain tolerance and other health complications      Assessment / Recommendations: Pt is making slow progress with PT and is improving ROM but seems to make gains in either flexion or extension and will experience a slight regression in the direction opposite of the gain. Continues w/ antalgic gait but is no longer using an AD. Pain continues to range between 2 and 5 or 6 on any given day. Patient will continue to benefit from skilled PT services to modify and progress therapeutic interventions, address functional mobility deficits, address ROM deficits, address strength deficits, analyze and address soft tissue restrictions, analyze and cue movement patterns and analyze and modify body mechanics/ergonomics to attain remaining goals.     Problem List: pain affecting function, decrease ROM, decrease strength, edema affecting function, impaired gait/ balance, decrease ADL/ functional abilitiies, decrease activity tolerance and decrease flexibility/ joint mobility                        Treatment Plan: Therapeutic exercise, Therapeutic activities, Neuromuscular re-education, Physical agent/modality, Gait/balance training, Manual therapy, Patient education, Functional mobility training and Stair training     Patient Goal (s) has been updated and includes: \"get my knee back to normal\"      Updated Goals to be accomplished in 4 weeks:  Continue with unmet goals above     Frequency / Duration: Patient to be seen 2 times per week for 4 weeks:     G-Codes (GP)  Mobility  [id number] Current  CK= 40-59%   Goal  CK= 40-59%     The severity rating is based on clinical judgment and the FOTO score.     Certification Period: 2/21/18 - 5/19/18    Debby Walls PT 2/22/2018 4:55 PM    ________________________________________________________________________  I certify that the above Therapy Services are being furnished while the patient is under my care. I agree with the treatment plan and certify that this therapy is necessary.     [] I have read the above and request that my patient continue as recommended.   [] I have read the above report and request that my patient continue therapy with the following changes/special instructions: _______________________________________  [] I have read the above report and request that my patient be discharged from therapy    Physician's Signature:_______________________________Date:___________Time:__________    Please sign and return to [facility] - [address]        [place], [address] [place]   [phone] fax

## 2018-02-23 ENCOUNTER — HOSPITAL ENCOUNTER (OUTPATIENT)
Dept: PHYSICAL THERAPY | Age: 73
Discharge: HOME | End: 2018-02-23
Payer: MEDICARE

## 2018-02-23 PROCEDURE — 97140 MANUAL THERAPY 1/> REGIONS: CPT

## 2018-02-23 PROCEDURE — 97110 THERAPEUTIC EXERCISES: CPT

## 2018-02-23 NOTE — PROGRESS NOTES
PT DAILY TREATMENT NOTE - [facility]    Patient Name: Sam Canela. Date:2018  : 1945  [x]  Patient  Verified  Payor: Johnny Zapien / Plan: [facility] / Product Type: Managed Care Medicare /    In time:12:00  Out time:12:48  Total Treatment Time (min): 48  Total Timed Codes (min): 38  1:1 Treatment Time ( [address] only): 38   Visit #: 11 of 12    Treatment Area: Left knee pain [M25.562]    SUBJECTIVE  Pain Level (0-10 scale): 4  Any medication changes, allergies to medications, adverse drug reactions, diagnosis change, or new procedure performed?: [x] No    [] Yes (see summary sheet for update)  Subjective functional status/changes:   [] No changes reported  \"It's not doing too good. I think I overdid it last night doing this stuff. \"    OBJECTIVE  Modality rationale: decrease pain to improve the patients ability to ease ADL tolerance   Min Type Additional Details    [] Estim:  []Unatt       []IFC  []Premod                        []Other:  []w/ice   []w/heat  Position:  Location:    [] Estim: []Att    []TENS instruct  []NMES                    []Other:  []w/US   []w/ice   []w/heat  Position:  Location:    []  Traction: [] Cervical       []Lumbar                       [] Prone          []Supine                       []Intermittent   []Continuous Lbs:  [] before manual  [] after manual    []  Ultrasound: []Continuous   [] Pulsed                           []1MHz   []3MHz Location:  W/cm2:    []  Iontophoresis with dexamethasone         Location: [] Take home patch   [] In clinic   10 [x]  Ice     []  heat  []  Ice massage  []  Laser   []  Anodyne Position: seated  Location: left knee    []  Laser with stim  []  Other: Position:  Location:    []  Vasopneumatic Device Pressure:       [] lo [] med [] hi   Temperature: [] lo [] med [] hi   [] Skin assessment post-treatment:  []intact []redness- no adverse reaction    []redness - adverse reaction:     30 min Therapeutic Exercise:  [x] See flow sheet :   Rationale: increase ROM, increase strength and improve coordination to improve the patients ability to increase ease with ADLs    8 min Manual Therapy:  Contract-relax technique in seated position, grade II-III knee ext mobs   Rationale: decrease pain, increase ROM and increase tissue extensibility to ease ADL tolerance    With   [] TE   [] TA   [] neuro   [] other: Patient Education: [x] Review HEP    [] Progressed/Changed HEP based on:   [] positioning   [] body mechanics   [] transfers   [] heat/ice application    [] other:      Other Objective/Functional Measures: Added standing TKE with green theraband     Pain Level (0-10 scale) post treatment: 3    ASSESSMENT/Changes in Function:   Patient continues to lack knee extension. He has poor tolerance to manual with pained facial grimaces throughout. Patient will continue to benefit from skilled PT services to address ROM deficits, address strength deficits, analyze and address soft tissue restrictions, analyze and cue movement patterns, analyze and modify body mechanics/ergonomics, assess and modify postural abnormalities and instruct in home and community integration to attain remaining goals. []  See Plan of Care  []  See progress note/recertification  []  See Discharge Summary         Progress towards goals / Updated goals:  Long Term Goals: To be accomplished in 8 weeks:  1. Pt will improve FOTO to > or = t 60 to demo improved function. At PN:  FOTO = 42  Current: Progressing. FOTO 45  2. Pt will increase AROM L knee to > or = to 3-120 deg for ease w/ normalizing gait. At PN:  L Knee AROM 0-8-96 deg  Current: Progressing, L knee AROM 5- 105 deg today following manual  3. Pt will increase MMT L LE to > or = to 4-4+/5 grossly for ease w/ stair navigation. At PN:  MMT L knee (within available range) 4-/5 for knee ext and flex  Current:MET. Left LE  4-/5 for knee flexion and extension  4.  Pt will demo correct heel toe gait w/o AD for ease w/ walking around the grocery store.    At PN: Pt is ambulates with no AD with antalgic gait  Current: Remains, Antalgic L gait pattern with maintenance of L knee flexion throughout gait and decreased L push-off    PLAN  []  Upgrade activities as tolerated     [x]  Continue plan of care  []  Update interventions per flow sheet       []  Discharge due to:_  []  Other:_      Regino Gomez 2/23/2018  12:04 PM    Future Appointments  Date Time Provider Gonzalo Huerta   2/26/2018 11:30 AM Chris Ley PTA [facility]   3/2/2018 11:30 AM VIOLETTE GentilePTS [facility]

## 2018-02-26 ENCOUNTER — HOSPITAL ENCOUNTER (OUTPATIENT)
Dept: PHYSICAL THERAPY | Age: 73
Discharge: HOME | End: 2018-02-26
Payer: MEDICARE

## 2018-02-26 PROCEDURE — 97110 THERAPEUTIC EXERCISES: CPT

## 2018-02-26 PROCEDURE — 97140 MANUAL THERAPY 1/> REGIONS: CPT

## 2018-02-26 NOTE — PROGRESS NOTES
PT DAILY TREATMENT NOTE - [facility]     Patient Name: Phillip Valentino. Date:2018  : 1945  [x]  Patient  Verified  Payor: Nigel Waggoner / Plan: Lanette Maldonado / Product Type: Managed Care Medicare /    In time:11:26  Out time:12:28  Total Treatment Time (min): 62  Total Timed Codes (min): 52  1:1 Treatment Time ( [address] only): 40   Visit #: 2 of 8 (new PN)    Treatment Area: Left knee pain [M25.562]    SUBJECTIVE  Pain Level (0-10 scale): 2   Any medication changes, allergies to medications, adverse drug reactions, diagnosis change, or new procedure performed?: [x] No    [] Yes (see summary sheet for update)  Subjective functional status/changes:   [] No changes reported  Pt reports he cut the grass for an hour and a half yesterday and his knee swelled up bad after he got done. OBJECTIVE    Modality rationale: decrease pain to improve the patients ability to decrease difficulty while performing tasks.     Min Type Additional Details    [] Estim:  []Unatt       []IFC  []Premod                        []Other:  []w/ice   []w/heat  Position:  Location:    [] Estim: []Att    []TENS instruct  []NMES                    []Other:  []w/US   []w/ice   []w/heat  Position:  Location:    []  Traction: [] Cervical       []Lumbar                       [] Prone          []Supine                       []Intermittent   []Continuous Lbs:  [] before manual  [] after manual    []  Ultrasound: []Continuous   [] Pulsed                           []1MHz   []3MHz W/cm2:  Location:    []  Iontophoresis with dexamethasone         Location: [] Take home patch   [] In clinic   10 [x]  Ice     [x]  heat  []  Ice massage  []  Laser   []  Anodyne Position:sittring  Location:heat on hamstring; ice on knee    []  Laser with stim  []  Other:  Position:  Location:    []  Vasopneumatic Device Pressure:       [] lo [] med [] hi   Temperature: [] lo [] med [] hi   [] Skin assessment post-treatment:  []intact []redness- no adverse reaction    []redness - adverse reaction:        42 min Therapeutic Exercise:  [x] See flow sheet :   Rationale: increase ROM and increase strength to improve the patients ability to increase tolerance to activities.             10 min Manual Therapy:   Knee flexion and ext mobs, patellar mobs, popliteal release, hamstrings. Rationale: decrease pain, increase ROM, increase tissue extensibility and decrease trigger points to increase tolerance to activities.               With   [] TE   [] TA   [] neuro   [] other: Patient Education: [x] Review HEP    [] Progressed/Changed HEP based on:   [] positioning   [] body mechanics   [] transfers   [] heat/ice application    [] other:      Other Objective/Functional Measures: pre manual 20-95 deg. Pain Level (0-10 scale) post treatment: 3-4    ASSESSMENT/Changes in Function: Continue to progress pt as tolerated. Pt very tender along popliteal and hamstring. Pt present with a little swelling around patella. Patient will continue to benefit from skilled PT services to modify and progress therapeutic interventions, address functional mobility deficits, address ROM deficits, address strength deficits and analyze and address soft tissue restrictions to attain remaining goals. []  See Plan of Care  []  See progress note/recertification  []  See Discharge Summary         Progress towards goals / Updated goals:  Long Term Goals: To be accomplished in 8 weeks:  1. Pt will improve FOTO to > or = t 60 to demo improved function. At PN:  FOTO 45  2. Pt will increase AROM L knee to > or = to 3-120 deg for ease w/ normalizing gait. At PN: L knee AROM 5- 105 deg today following manual  3. Pt will increase MMT L LE to > or = to 4-4+/5 grossly for ease w/ stair navigation. At PN: Left LE  4-/5 for knee flexion and extension  4. Pt will demo correct heel toe gait w/o AD for ease w/ walking around the grocery store.    At PN:  Antalgic L gait pattern with maintenance of L knee flexion throughout gait and decreased L push-off      Goals Met:    1. Pt will be independent and complaint w/ progressed HEP to progress gains in PT.    At eval: reviewed, progressed, and initiated HEP  Current: Met, Performance reported as prescribed, 1/18/2018    PLAN  []  Upgrade activities as tolerated     [x]  Continue plan of care  []  Update interventions per flow sheet       []  Discharge due to:_  []  Other:_      Radha Park PTA 2/26/2018  11:31 AM    Future Appointments  Date Time Provider Gonzalo Huerta   3/2/2018 11:30 AM Radha Park PTA [facility]

## 2018-02-28 ENCOUNTER — APPOINTMENT (OUTPATIENT)
Dept: PHYSICAL THERAPY | Age: 73
End: 2018-02-28
Payer: MEDICARE

## 2018-03-02 ENCOUNTER — HOSPITAL ENCOUNTER (OUTPATIENT)
Dept: PHYSICAL THERAPY | Age: 73
Discharge: HOME | End: 2018-03-02
Payer: MEDICARE

## 2018-03-02 PROCEDURE — 97110 THERAPEUTIC EXERCISES: CPT

## 2018-03-02 PROCEDURE — G8979 MOBILITY GOAL STATUS: HCPCS | Performed by: PHYSICAL THERAPIST

## 2018-03-02 PROCEDURE — G8980 MOBILITY D/C STATUS: HCPCS | Performed by: PHYSICAL THERAPIST

## 2018-03-02 NOTE — PROGRESS NOTES
[facility] - [facility]              [address]        [place], [address] [place]   [phone] fax      Discharge Summary  Patient name: Amanda Kingston Start of Care: 18   Referral source: Ana Pedro MD : 1945   Medical/Treatment Diagnosis: Left knee pain [M25.562] Onset Date:17     Prior Hospitalization: see medical history Provider#: [id number]   Medications: Verified on Patient Summary List    Comorbidities: allergies, BMI > 30, DM   Prior Level of Function: Hx of L knee pain, retired, walking w/o AD  Visits from [place] of Care: 22    Missed Visits: 0  Reporting Period : 18 to 3/2/18      Summary of Care:  Progress towards goals / Updated goals:  Long Term Goals: To be accomplished in 8 weeks:  1. Pt will improve FOTO to > or = t 60 to demo improved function. At PN:  FOTO 45  Current: progressing, FOTO = 47  2. Pt will increase AROM L knee to > or = to 3-120 deg for ease w/ normalizing gait. At PN: L knee AROM 5- 105 deg today following manual  Current: Not met: L knee AROM 9-97 deg without manual  3. Pt will increase MMT L LE to > or = to 4-4+/5 grossly for ease w/ stair navigation. At PN: Left LE  4-/5 for knee flexion and extension   Current: Goal met: L LE grossly 4+/5   4. Pt will demo correct heel toe gait w/o AD for ease w/ walking around the grocery store. At PN:  Antalgic L gait pattern with maintenance of L knee flexion throughout gait and decreased L push-off  Current; not met: Pt ambulates with antalgic gait pattern lacking knee flexion through swing phase and knee extension with stance phase    Pt has made progress toward LTGs. Pt continues to lack motion in L knee, but is not limited from is normal activities that he performs throughout the day. Pt has progressed L LE strength to WNL. Pt continues to ambulate with abnormal gait of lacking knee flexion through swing phase, and lacks knee extension with stance phase. G-Codes (GP)  Mobility  [id number] Current  CK= 40-59%  [id number] Goal  CK= 40-59%    The severity rating is based on clinical judgment and the FOTO Score score.     ASSESSMENT/RECOMMENDATIONS:  [x]Discontinue therapy: [x]Patient has reached or is progressing toward set goals      []Patient is non-compliant or has abdicated      []Due to lack of appreciable progress towards set [place], PT 3/2/2018 5:15 PM

## 2018-03-02 NOTE — PROGRESS NOTES
PT DAILY TREATMENT NOTE - [facility]     Patient Name: Km Jimenez. Date:3/2/2018  : 1945  [x]  Patient  Verified  Payor: Ale Stuart / Plan: Nan Thomas / Product Type: Managed Care Medicare /    In time:11:30  Out time:12:13  Total Treatment Time (min): 43  Total Timed Codes (min): 33  1:1 Treatment Time ( only): 33   Visit #: 3 of 8    Treatment Area: Left knee pain [M25.562]    SUBJECTIVE  Pain Level (0-10 scale): 3  Any medication changes, allergies to medications, adverse drug reactions, diagnosis change, or new procedure performed?: [x] No    [] Yes (see summary sheet for update)  Subjective functional status/changes:   [] No changes reported  Pt reports that the doctor cleared him and stated he did not need to come to therapy anymore. OBJECTIVE    Modality rationale: decrease pain to improve the patients ability to decrease difficulty while performing tasks.     Min Type Additional Details    [] Estim:  []Unatt       []IFC  []Premod                        []Other:  []w/ice   []w/heat  Position:  Location:    [] Estim: []Att    []TENS instruct  []NMES                    []Other:  []w/US   []w/ice   []w/heat  Position:  Location:    []  Traction: [] Cervical       []Lumbar                       [] Prone          []Supine                       []Intermittent   []Continuous Lbs:  [] before manual  [] after manual    []  Ultrasound: []Continuous   [] Pulsed                           []1MHz   []3MHz W/cm2:  Location:    []  Iontophoresis with dexamethasone         Location: [] Take home patch   [] In clinic   10 [x]  Ice     []  heat  []  Ice massage  []  Laser   []  Anodyne Position:sitting  Location:L knee    []  Laser with stim  []  Other:  Position:  Location:    []  Vasopneumatic Device Pressure:       [] lo [] med [] hi   Temperature: [] lo [] med [] hi   [] Skin assessment post-treatment:  []intact []redness- no adverse reaction    []redness - adverse reaction: 33 min Therapeutic Exercise:  [x] See flow sheet :   Rationale: increase ROM and increase strength to improve the patients ability to decrease difficulty while performing tasks. With   [] TE   [] TA   [] neuro   [] other: Patient Education: [x] Review HEP    [] Progressed/Changed HEP based on:   [] positioning   [] body mechanics   [] transfers   [] heat/ice application    [] other:      Other Objective/Functional Measures: see goals. Pain Level (0-10 scale) post treatment: 2    ASSESSMENT/Changes in Function:  Pt has made progress toward LTGs. Pt continues to lack motion in L knee, but is not limited from is normal activities that he performs throughout the day. Pt has progressed L LE strength to WNL. Pt continues to ambulate with abnormal gait of lacking knee flexion through swing phase, and lacks knee extension with stance phase.       []  See Plan of Care  []  See progress note/recertification  [x]  See Discharge Summary         Progress towards goals / Updated goals:  Long Term Goals: To be accomplished in 8 weeks:  1. Pt will improve FOTO to > or = t 60 to demo improved function. At PN:  FOTO 45  2. Pt will increase AROM L knee to > or = to 3-120 deg for ease w/ normalizing gait. At PN: L knee AROM 5- 105 deg today following manual  Current: Not met: L knee AROM 9-97 deg without manual. 3/2/18   3. Pt will increase MMT L LE to > or = to 4-4+/5 grossly for ease w/ stair navigation. At PN: Left LE  4-/5 for knee flexion and extension   Current: Goal met: L LE grossly 4+/5 3/2/18  4. Pt will demo correct heel toe gait w/o AD for ease w/ walking around the grocery store. At PN:  Antalgic L gait pattern with maintenance of L knee flexion throughout gait and decreased L push-off  Current; not met: Pt ambulates with antalgic gait pattern lacking knee flexion through swing phase and knee extension with stance phase.  3/2/18        Goals Met:    1. Pt will be independent and complaint w/ progressed HEP to progress gains in PT. At eval: reviewed, progressed, and initiated HEP  Current: Met    PLAN  []  Upgrade activities as tolerated     [x]  Continue plan of care  []  Update interventions per flow sheet       []  Discharge due to:_  []  Other:_      Fior Gusman, PTA 3/2/2018  11:45 AM    No future appointments.

## 2020-12-18 ENCOUNTER — OFFICE VISIT (OUTPATIENT)
Dept: ORTHOPEDIC SURGERY | Age: 75
End: 2020-12-18
Payer: MEDICARE

## 2020-12-18 VITALS — WEIGHT: 215 LBS | HEIGHT: 71 IN | BODY MASS INDEX: 30.1 KG/M2

## 2020-12-18 DIAGNOSIS — S49.91XA INJURY OF RIGHT SHOULDER, INITIAL ENCOUNTER: Primary | ICD-10-CM

## 2020-12-18 PROCEDURE — 99203 OFFICE O/P NEW LOW 30 MIN: CPT | Performed by: ORTHOPAEDIC SURGERY

## 2020-12-18 RX ORDER — TRAMADOL HYDROCHLORIDE 50 MG/1
50 TABLET ORAL
Qty: 30 TAB | Refills: 0 | Status: SHIPPED | OUTPATIENT
Start: 2020-12-18 | End: 2020-12-22 | Stop reason: ALTCHOICE

## 2020-12-18 NOTE — PROGRESS NOTES
Name: Kostas Fermin    : 1945     Service Dept: [address] [facility] and Sports Medicine    Patient's Pharmacies:    [address] Yazmin Ruano.  [address] [address]  Phone: [phone] Fax: [phone]       Chief Complaint   Patient presents with    Arm Pain    Shoulder Pain        Visit Vitals  Ht 5' 11\" (1.803 m)   Wt 215 lb (97.5 kg)   BMI 29.99 kg/m²      Allergies   Allergen Reactions    Penicillins Unknown (comments)     Other reaction(s): mild rash/itching      Current Outpatient Medications   Medication Sig Dispense Refill    linagliptin (TRADJENTA) 5 mg tablet Take 5 mg by mouth daily.  tamsulosin (FLOMAX) 0.4 mg capsule Take 1 Cap by mouth daily. 30 Cap 3    ONETOUCH ULTRA TEST strip   0    pioglitazone (ACTOS) 15 mg tablet Take 30 mg by mouth daily.  cyanocobalamin (B-12 DOTS) 500 mcg tablet Take 500 mcg by mouth daily.  ferrous sulfate (IRON) 325 mg (65 mg Iron) tablet Take  by mouth daily (before breakfast).  GLIPIZIDE (GLUCOTROL PO) Take  by mouth.  OMEPRAZOLE (PRILOSEC PO) Take  by mouth.  SIMVASTATIN (ZOCOR PO) Take  by mouth.  hydrochlorothiazide (HYDRODIURIL) 50 mg tablet Take 25 mg by mouth daily.  canagliflozin (INVOKANA) 300 mg tablet Take  by mouth Daily (before breakfast).  oxyCODONE-acetaminophen (PERCOCET) 5-325 mg per tablet Take 1 Tab by mouth every four (4) hours as needed for Pain. Max Daily Amount: 6 Tabs. 15 Tab 0    metFORMIN (GLUCOPHAGE) 1,000 mg tablet Take 1,000 mg by mouth two (2) times daily (with meals).  etodolac (LODINE) 400 mg tablet Take  by mouth two (2) times daily (with meals).         Patient Active Problem List   Diagnosis Code    Calculus of kidney N20.0    Hyperplasia of prostate with lower urinary tract symptoms (LUTS) N40.1      Family History   Problem Relation Age of Onset    Cancer Other     Diabetes Other     Diabetes Mother     Cancer Father       Social History     Socioeconomic History    Marital status:      Spouse name: Not on file    Number of children: Not on file    Years of education: Not on file    Highest education level: Not on file   Tobacco Use    Smoking status: Former Smoker    Smokeless tobacco: Never Used   Substance and Sexual Activity    Alcohol use: Yes     Alcohol/week: 0.8 standard drinks     Types: 1 Shots of liquor per week     Comment: oc    Drug use: No      Past Surgical History:   Procedure Laterality Date    CYSTOSCOPY  2/12/2009    Left ureteroplvic junction - stone removed with basket    HX GASTRIC BYPASS  2005    HX ORTHOPAEDIC Left     shoulder sx    HX OTHER SURGICAL      Left shoulder    HX OTHER SURGICAL      4-5 Lumbar disc    HX UROLOGICAL      LAP,PYELOPLASTY      Left      Past Medical History:   Diagnosis Date    Arthritis     Calculus of kidney     Diabetes ([facility] [address]     Diabetes mellitus     Diverticulitis     Stricture or kinking of ureter     Unspecified hyperplasia of prostate with urinary obstruction and other lower urinary tract symptoms (LUTS)         I have reviewed and agree with PFSH and ROS and intake form in chart and the record. Review of Systems:   Patient is a pleasant appearing individual, appropriately dressed, well hydrated, well nourished, who is alert, appropriately oriented for age, and in no acute distress with a normal gait and normal affect who does not appear to be in any significant pain. Physical Exam:  Right Shoulder - Positive \"empty can\" test, Grossly neurovascularly intact. Range of motion-Full passive, Active with impingement. No Point tenderness, Strength-significant weakness noted with abduction, some mild crepitation, No skin lesion are identified, No instabilty is noted, No apprehension. No Swelling. Left Shoulder - grossly neurovascularly intact.  Full Range of motion, No Weakness with abduction, No Point Tenderness, No skin lesion are identified, No instabilty is noted, No apprehension. No cuts or abrasions are identified. Encounter Diagnoses     ICD-10-CM ICD-9-CM   1. Injury of right shoulder, initial encounter  S49. 91XA 959.2         HPI:  The patient is here with a chief complaint of right shoulder pain, sharp, throbbing pain. It has been the same. Nothing has helped. Pain is 8/10. He fell on the shoulder and has been having difficulty. ROS:  10-point review of systems is unremarkable. X-rays of the right shoulder are unremarkable. Assessment/Plan:  1. Right shoulder possible rotator cuff pathology. Plan at this point, I would like to go ahead and get an MRI of the right shoulder. I will see the patient post-MRI. We will do tramadol in the meantime and go from there. Return to Office:    Post MRI     Scribed by Arnol De La Cruz MD as dictated by Mary Lou Knight. Zulema Rivero MD.  Documentation True and Accepted Fred Rivero MD

## 2020-12-22 RX ORDER — HYDROCODONE BITARTRATE AND ACETAMINOPHEN 5; 325 MG/1; MG/1
TABLET ORAL
COMMUNITY
Start: 2020-12-14 | End: 2021-01-22

## 2020-12-22 RX ORDER — FUROSEMIDE 20 MG/1
TABLET ORAL
COMMUNITY
Start: 2020-10-17 | End: 2021-01-22

## 2020-12-22 NOTE — PATIENT INSTRUCTIONS
Shoulder Pain: Care Instructions Your Care Instructions You can hurt your shoulder by using it too much during an activity, such as fishing or baseball. It can also happen as part of the everyday wear and tear of getting older. Shoulder injuries can be slow to heal, but your shoulder should get better with time. Your doctor may recommend a sling to rest your shoulder. If you have injured your shoulder, you may need testing and treatment. Follow-up care is a key part of your treatment and safety. Be sure to make and go to all appointments, and call your doctor if you are having problems. It's also a good idea to know your test results and keep a list of the medicines you take. How can you care for yourself at home? · Take pain medicines exactly as directed. ? If the doctor gave you a prescription medicine for pain, take it as prescribed. ? If you are not taking a prescription pain medicine, ask your doctor if you can take an over-the-counter medicine. ? Do not take two or more pain medicines at the same time unless the doctor told you to. Many pain medicines contain acetaminophen, which is Tylenol. Too much acetaminophen (Tylenol) can be harmful. · If your doctor recommends that you wear a sling, use it as directed. Do not take it off before your doctor tells you to. · Put ice or a cold pack on the sore area for 10 to 20 minutes at a time. Put a thin cloth between the ice and your skin. · If there is no swelling, you can put moist heat, a heating pad, or a warm cloth on your shoulder. Some doctors suggest alternating between hot and cold. · Rest your shoulder for a few days. If your doctor recommends it, you can then begin gentle exercise of the shoulder, but do not lift anything heavy. When should you call for help? Call 911 anytime you think you may need emergency care. For example, call if: 
  · You have chest pain or pressure. This may occur with: ? Sweating. ? Shortness of breath. ? Nausea or vomiting. ? Pain that spreads from the chest to the neck, jaw, or one or both shoulders or arms. ? Dizziness or lightheadedness. ? A fast or uneven pulse. After calling 911, chew 1 adult-strength aspirin. Wait for an ambulance. Do not try to drive yourself.  
  · Your arm or hand is cool or pale or changes color. Call your doctor now or seek immediate medical care if: 
  · You have signs of infection, such as: 
? Increased pain, swelling, warmth, or redness in your shoulder. ? Red streaks leading from a place on your shoulder. ? Pus draining from an area of your shoulder. ? Swollen lymph nodes in your neck, armpits, or groin. ? A fever. Watch closely for changes in your health, and be sure to contact your doctor if: 
  · You cannot use your shoulder.  
  · Your shoulder does not get better as expected. Where can you learn more? Go to http://www.gray.com/ Enter F212 in the search box to learn more about \"Shoulder Pain: Care Instructions. \" Current as of: March 2, 2020               Content Version: 12.6 © 2915-3678 Healthwise, Incorporated. Care instructions adapted under license by [employer] (which disclaims liability or warranty for this information). If you have questions about a medical condition or this instruction, always ask your healthcare professional. [place] [address] any warranty or liability for your use of this information.

## 2021-01-05 ENCOUNTER — HOSPITAL ENCOUNTER (OUTPATIENT)
Age: 76
Discharge: HOME | End: 2021-01-05
Payer: MEDICARE

## 2021-01-05 DIAGNOSIS — S49.91XA INJURY OF RIGHT SHOULDER, INITIAL ENCOUNTER: ICD-10-CM

## 2021-01-05 PROCEDURE — 73221 MRI JOINT UPR EXTREM W/O DYE: CPT

## 2021-01-08 ENCOUNTER — OFFICE VISIT (OUTPATIENT)
Dept: ORTHOPEDIC SURGERY | Age: 76
End: 2021-01-08
Payer: MEDICARE

## 2021-01-08 VITALS — HEIGHT: 71 IN | BODY MASS INDEX: 30.1 KG/M2 | WEIGHT: 215 LBS

## 2021-01-08 DIAGNOSIS — G89.29 CHRONIC RIGHT SHOULDER PAIN: ICD-10-CM

## 2021-01-08 DIAGNOSIS — M19.011 PRIMARY OSTEOARTHRITIS OF RIGHT SHOULDER: Primary | ICD-10-CM

## 2021-01-08 DIAGNOSIS — M25.511 CHRONIC RIGHT SHOULDER PAIN: ICD-10-CM

## 2021-01-08 DIAGNOSIS — M19.011 PRIMARY OSTEOARTHRITIS OF RIGHT SHOULDER: ICD-10-CM

## 2021-01-08 PROCEDURE — 99214 OFFICE O/P EST MOD 30 MIN: CPT | Performed by: ORTHOPAEDIC SURGERY

## 2021-01-08 PROCEDURE — G8536 NO DOC ELDER MAL SCRN: HCPCS | Performed by: ORTHOPAEDIC SURGERY

## 2021-01-08 PROCEDURE — 1101F PT FALLS ASSESS-DOCD LE1/YR: CPT | Performed by: ORTHOPAEDIC SURGERY

## 2021-01-08 PROCEDURE — G8427 DOCREV CUR MEDS BY ELIG CLIN: HCPCS | Performed by: ORTHOPAEDIC SURGERY

## 2021-01-08 PROCEDURE — G8510 SCR DEP NEG, NO PLAN REQD: HCPCS | Performed by: ORTHOPAEDIC SURGERY

## 2021-01-08 PROCEDURE — G8419 CALC BMI OUT NRM PARAM NOF/U: HCPCS | Performed by: ORTHOPAEDIC SURGERY

## 2021-01-08 RX ORDER — TRAMADOL HYDROCHLORIDE 50 MG/1
50 TABLET ORAL
Qty: 30 TAB | Refills: 0 | Status: SHIPPED | OUTPATIENT
Start: 2021-01-08 | End: 2021-01-22

## 2021-01-08 NOTE — PROGRESS NOTES
Name: Moy Rodríguez. : 1945     Service Dept: [address] [place] [address] Orthopaedics and Sports Medicine    [address]'s Pharmacies:    [address]  [address] [place], [place] [address] [address]  Phone: [phone] Fax: [phone]       Chief Complaint   Patient presents with    Shoulder Pain        Visit Vitals  Ht 5' 11\" (1.803 m)   Wt 215 lb (97.5 kg)   BMI 29.99 kg/m²      Allergies   Allergen Reactions    Penicillins Unknown (comments)     Other reaction(s): mild rash/itching      Current Outpatient Medications   Medication Sig Dispense Refill    furosemide (LASIX) 20 mg tablet       HYDROcodone-acetaminophen (NORCO) 5-325 mg per tablet take 1 tablet by mouth every 6 hours if needed for 7 days      canagliflozin (INVOKANA) 300 mg tablet Take  by mouth Daily (before breakfast).  linagliptin (TRADJENTA) 5 mg tablet Take 5 mg by mouth daily.  tamsulosin (FLOMAX) 0.4 mg capsule Take 1 Cap by mouth daily. 30 Cap 3    ONETOUCH ULTRA TEST strip   0    oxyCODONE-acetaminophen (PERCOCET) 5-325 mg per tablet Take 1 Tab by mouth every four (4) hours as needed for Pain. Max Daily Amount: 6 Tabs. 15 Tab 0    metFORMIN (GLUCOPHAGE) 1,000 mg tablet Take 1,000 mg by mouth two (2) times daily (with meals).  etodolac (LODINE) 400 mg tablet Take  by mouth two (2) times daily (with meals).  pioglitazone (ACTOS) 15 mg tablet Take 30 mg by mouth daily.  cyanocobalamin (B-12 DOTS) 500 mcg tablet Take 500 mcg by mouth daily.  ferrous sulfate (IRON) 325 mg (65 mg Iron) tablet Take  by mouth daily (before breakfast).  GLIPIZIDE (GLUCOTROL PO) Take  by mouth.  OMEPRAZOLE (PRILOSEC PO) Take  by mouth.  SIMVASTATIN (ZOCOR PO) Take  by mouth.  hydrochlorothiazide (HYDRODIURIL) 50 mg tablet Take 25 mg by mouth daily.         Patient Active Problem List   Diagnosis Code    Calculus of kidney N20.0    Hyperplasia of prostate with lower urinary tract symptoms (LUTS) N40.1      Family History   Problem Relation Age of Onset    Diabetes Mother     Cancer Father     Cancer Other     Diabetes Other       Social History     Socioeconomic History    Marital status:      Spouse name: Not on file    Number of children: Not on file    Years of education: Not on file    Highest education level: Not on file   Tobacco Use    Smoking status: Former Smoker    Smokeless tobacco: Current User   Substance and Sexual Activity    Alcohol use: Not Currently     Alcohol/week: 0.8 standard drinks     Types: 1 Shots of liquor per week     Comment: oc    Drug use: No    Sexual activity: Not Currently      Past Surgical History:   Procedure Laterality Date    CYSTOSCOPY  2/12/2009    Left ureteroplvic junction - stone removed with basket    HX BACK SURGERY      HX GASTRIC BYPASS  2005    HX ORTHOPAEDIC Left     shoulder sx    HX OTHER SURGICAL      Left shoulder    HX OTHER SURGICAL      4-5 Lumbar disc    HX UROLOGICAL      MI ANESTH,SURGERY OF SHOULDER      MI LAP,PYELOPLASTY      Left      Past Medical History:   Diagnosis Date    Arthritis     Calculus of kidney     Diabetes ([facility] [address]     Diabetes mellitus     Diverticulitis     Stricture or kinking of ureter     Unspecified hyperplasia of prostate with urinary obstruction and other lower urinary tract symptoms (LUTS)         I have reviewed and agree with [address] [place] [address] Nw and ROS and intake form in chart and the record. Review of Systems:   Patient is a pleasant appearing individual, appropriately dressed, well hydrated, well nourished, who is alert, appropriately oriented for age, and in no acute distress with a normal gait and normal affect who does not appear to be in any significant pain. Physical Exam:  Right Shoulder - Grossly neurovascularly intact. Range of motion-Decreased actively and passively.  No Point tenderness, Strength-weakness with abduction, positive crepitation, No skin lesion are identified, No instabilty is noted, Positive apprehension, No Swelling. Left Shoulder - Grossly neurovascularly intact, Full Range of motion, No point tenderness, No weakness, No skin lesions, No Instability, No apprehension, No swelling. Encounter Diagnoses     ICD-10-CM ICD-9-CM   1. Primary osteoarthritis of right shoulder  M19.011 715.11   2. Chronic right shoulder pain  M25.511 719.41    G89.29 338.29       HPI:  The patient is here with a chief complaint of right shoulder pain, sharp throbbing pain. It has been the same. Nothing has helped. Pain is 7/10. ROS:  10-point review of systems is positive for stiffness and nighttime pain. X-rays are positive for severe OA. MRI is positive for rotator cuff arthropathy. Assessment/Plan:  1. Right shoulder with rotator cuff arthropathy. Plan would be for right shoulder reverse prosthesis placement with general medical clearance and cardiac clearance. We will get the patient set up and go from there. Return to Office: Follow-up and Dispositions    · Return for [facility]. Scribed by Riaz Grewal as dictated by Jessica Dangelo. Jose Ronquillo MD.  Documentation True and Accepted Fred Ronquillo MD

## 2021-01-08 NOTE — PATIENT INSTRUCTIONS
Shoulder Pain: Care Instructions Your Care Instructions You can hurt your shoulder by using it too much during an activity, such as fishing or baseball. It can also happen as part of the everyday wear and tear of getting older. Shoulder injuries can be slow to heal, but your shoulder should get better with time. Your doctor may recommend a sling to rest your shoulder. If you have injured your shoulder, you may need testing and treatment. Follow-up care is a key part of your treatment and safety. Be sure to make and go to all appointments, and call your doctor if you are having problems. It's also a good idea to know your test results and keep a list of the medicines you take. How can you care for yourself at home? · Take pain medicines exactly as directed. ? If the doctor gave you a prescription medicine for pain, take it as prescribed. ? If you are not taking a prescription pain medicine, ask your doctor if you can take an over-the-counter medicine. ? Do not take two or more pain medicines at the same time unless the doctor told you to. Many pain medicines contain acetaminophen, which is Tylenol. Too much acetaminophen (Tylenol) can be harmful. · If your doctor recommends that you wear a sling, use it as directed. Do not take it off before your doctor tells you to. · Put ice or a cold pack on the sore area for 10 to 20 minutes at a time. Put a thin cloth between the ice and your skin. · If there is no swelling, you can put moist heat, a heating pad, or a warm cloth on your shoulder. Some doctors suggest alternating between hot and cold. · Rest your shoulder for a few days. If your doctor recommends it, you can then begin gentle exercise of the shoulder, but do not lift anything heavy. When should you call for help? Call 911 anytime you think you may need emergency care. For example, call if: 
  · You have chest pain or pressure. This may occur with: ? Sweating. ? Shortness of breath. ? Nausea or vomiting. ? Pain that spreads from the chest to the neck, jaw, or one or both shoulders or arms. ? Dizziness or lightheadedness. ? A fast or uneven pulse. After calling 911, chew 1 adult-strength aspirin. Wait for an ambulance. Do not try to drive yourself.  
  · Your arm or hand is cool or pale or changes color. Call your doctor now or seek immediate medical care if: 
  · You have signs of infection, such as: 
? Increased pain, swelling, warmth, or redness in your shoulder. ? Red streaks leading from a place on your shoulder. ? Pus draining from an area of your shoulder. ? Swollen lymph nodes in your neck, armpits, or groin. ? A fever. Watch closely for changes in your health, and be sure to contact your doctor if: 
  · You cannot use your shoulder.  
  · Your shoulder does not get better as expected. Where can you learn more? Go to http://www.gray.com/ Enter G237 in the search box to learn more about \"Shoulder Pain: Care Instructions. \" Current as of: March 2, 2020               Content Version: 12.6 © 8775-9233 Healthwise, Incorporated. Care instructions adapted under license by [employer] (which disclaims liability or warranty for this information). If you have questions about a medical condition or this instruction, always ask your healthcare professional. [place] [address] any warranty or liability for your use of this information.

## 2021-01-20 DIAGNOSIS — M25.511 CHRONIC RIGHT SHOULDER PAIN: ICD-10-CM

## 2021-01-20 DIAGNOSIS — G89.29 CHRONIC RIGHT SHOULDER PAIN: ICD-10-CM

## 2021-01-20 DIAGNOSIS — M19.011 PRIMARY OSTEOARTHRITIS OF RIGHT SHOULDER: ICD-10-CM

## 2021-01-22 RX ORDER — AMIODARONE HYDROCHLORIDE 100 MG/1
50 TABLET ORAL DAILY
COMMUNITY

## 2021-02-01 ENCOUNTER — ANESTHESIA EVENT (OUTPATIENT)
Dept: SURGERY | Age: 76
End: 2021-02-01
Payer: MEDICARE

## 2021-02-04 ENCOUNTER — HOSPITAL ENCOUNTER (OUTPATIENT)
Dept: PREADMISSION TESTING | Age: 76
Discharge: HOME | End: 2021-02-04
Payer: MEDICARE

## 2021-02-04 LAB — SARS-COV-2, COV2: NORMAL

## 2021-02-04 PROCEDURE — U0003 INFECTIOUS AGENT DETECTION BY NUCLEIC ACID (DNA OR RNA); SEVERE ACUTE RESPIRATORY SYNDROME CORONAVIRUS 2 (SARS-COV-2) (CORONAVIRUS DISEASE [COVID-19]), AMPLIFIED PROBE TECHNIQUE, MAKING USE OF HIGH THROUGHPUT TECHNOLOGIES AS DESCRIBED BY CMS-2020-01-R: HCPCS

## 2021-02-05 LAB — SARS-COV-2, COV2NT: NOT DETECTED

## 2021-02-08 ENCOUNTER — ANESTHESIA (OUTPATIENT)
Dept: SURGERY | Age: 76
End: 2021-02-08
Payer: MEDICARE

## 2021-02-08 ENCOUNTER — APPOINTMENT (OUTPATIENT)
Dept: GENERAL RADIOLOGY | Age: 76
End: 2021-02-08
Attending: ORTHOPAEDIC SURGERY
Payer: MEDICARE

## 2021-02-08 ENCOUNTER — HOSPITAL ENCOUNTER (OUTPATIENT)
Age: 76
Discharge: HOME | End: 2021-02-08
Attending: ORTHOPAEDIC SURGERY | Admitting: ORTHOPAEDIC SURGERY
Payer: MEDICARE

## 2021-02-08 VITALS
WEIGHT: 217 LBS | BODY MASS INDEX: 30.27 KG/M2 | OXYGEN SATURATION: 94 % | DIASTOLIC BLOOD PRESSURE: 61 MMHG | RESPIRATION RATE: 17 BRPM | TEMPERATURE: 97.2 F | SYSTOLIC BLOOD PRESSURE: 120 MMHG | HEART RATE: 64 BPM

## 2021-02-08 DIAGNOSIS — M19.011 PRIMARY OSTEOARTHRITIS OF RIGHT SHOULDER: Primary | ICD-10-CM

## 2021-02-08 PROBLEM — M17.9 KNEE OSTEOARTHRITIS: Status: ACTIVE | Noted: 2021-02-08

## 2021-02-08 LAB
ABO + RH BLD: NORMAL
BLOOD GROUP ANTIBODIES SERPL: NEGATIVE
GLUCOSE BLD STRIP.AUTO-MCNC: 168 MG/DL (ref 70–110)
PERFORMED BY, TECHID: ABNORMAL
SPECIMEN EXP DATE BLD: NORMAL

## 2021-02-08 PROCEDURE — 77030002982 HC SUT POLYSRB J&J -A: Performed by: ORTHOPAEDIC SURGERY

## 2021-02-08 PROCEDURE — 74011250636 HC RX REV CODE- 250/636: Performed by: NURSE ANESTHETIST, CERTIFIED REGISTERED

## 2021-02-08 PROCEDURE — 76060000034 HC ANESTHESIA 1.5 TO 2 HR: Performed by: ORTHOPAEDIC SURGERY

## 2021-02-08 PROCEDURE — 74011000272 HC RX REV CODE- 272: Performed by: ORTHOPAEDIC SURGERY

## 2021-02-08 PROCEDURE — 77030040375: Performed by: ORTHOPAEDIC SURGERY

## 2021-02-08 PROCEDURE — 77030026438 HC STYL ET INTUB CARD -A: Performed by: NURSE ANESTHETIST, CERTIFIED REGISTERED

## 2021-02-08 PROCEDURE — 64415 NJX AA&/STRD BRCH PLXS IMG: CPT | Performed by: NURSE ANESTHETIST, CERTIFIED REGISTERED

## 2021-02-08 PROCEDURE — 77030042022 HC SYST COLD THRPY BREG -B: Performed by: ORTHOPAEDIC SURGERY

## 2021-02-08 PROCEDURE — 77030018673: Performed by: ORTHOPAEDIC SURGERY

## 2021-02-08 PROCEDURE — 74011250636 HC RX REV CODE- 250/636: Performed by: ORTHOPAEDIC SURGERY

## 2021-02-08 PROCEDURE — 77030031139 HC SUT VCRL2 J&J -A: Performed by: ORTHOPAEDIC SURGERY

## 2021-02-08 PROCEDURE — 73020 X-RAY EXAM OF SHOULDER: CPT

## 2021-02-08 PROCEDURE — 77030002933 HC SUT MCRYL J&J -A: Performed by: ORTHOPAEDIC SURGERY

## 2021-02-08 PROCEDURE — 76210000026 HC REC RM PH II 1 TO 1.5 HR: Performed by: ORTHOPAEDIC SURGERY

## 2021-02-08 PROCEDURE — 74011000250 HC RX REV CODE- 250: Performed by: NURSE ANESTHETIST, CERTIFIED REGISTERED

## 2021-02-08 PROCEDURE — 76210000063 HC OR PH I REC FIRST 0.5 HR: Performed by: ORTHOPAEDIC SURGERY

## 2021-02-08 PROCEDURE — 82962 GLUCOSE BLOOD TEST: CPT

## 2021-02-08 PROCEDURE — 77030014368: Performed by: ORTHOPAEDIC SURGERY

## 2021-02-08 PROCEDURE — C1713 ANCHOR/SCREW BN/BN,TIS/BN: HCPCS | Performed by: ORTHOPAEDIC SURGERY

## 2021-02-08 PROCEDURE — 77030020471 HC BIT DRL CREV ZIMM -C: Performed by: ORTHOPAEDIC SURGERY

## 2021-02-08 PROCEDURE — 2709999900 HC NON-CHARGEABLE SUPPLY: Performed by: ORTHOPAEDIC SURGERY

## 2021-02-08 PROCEDURE — 77030041674 HC SLV COMPR SCD GTNG -B: Performed by: ORTHOPAEDIC SURGERY

## 2021-02-08 PROCEDURE — 77030011266 HC ELECTRD BLD INSL COVD -A: Performed by: ORTHOPAEDIC SURGERY

## 2021-02-08 PROCEDURE — 86901 BLOOD TYPING SEROLOGIC RH(D): CPT

## 2021-02-08 PROCEDURE — C1776 JOINT DEVICE (IMPLANTABLE): HCPCS | Performed by: ORTHOPAEDIC SURGERY

## 2021-02-08 PROCEDURE — 76010000153 HC OR TIME 1.5 TO 2 HR: Performed by: ORTHOPAEDIC SURGERY

## 2021-02-08 PROCEDURE — 77030006835 HC BLD SAW SAG STRY -B: Performed by: ORTHOPAEDIC SURGERY

## 2021-02-08 PROCEDURE — 77030029372 HC ADH SKN CLSR PRINEO J&J -C: Performed by: ORTHOPAEDIC SURGERY

## 2021-02-08 PROCEDURE — 74011000250 HC RX REV CODE- 250: Performed by: ORTHOPAEDIC SURGERY

## 2021-02-08 PROCEDURE — 77030008684 HC TU ET CUF COVD -B: Performed by: NURSE ANESTHETIST, CERTIFIED REGISTERED

## 2021-02-08 PROCEDURE — 77030013708 HC HNDPC SUC IRR PULS STRY –B: Performed by: ORTHOPAEDIC SURGERY

## 2021-02-08 DEVICE — IMPLANTABLE DEVICE: Type: IMPLANTABLE DEVICE | Site: SHOULDER | Status: FUNCTIONAL

## 2021-02-08 DEVICE — GLENOSPHERE RVS STD 0D 36MM -- COMPREHENSIVE: Type: IMPLANTABLE DEVICE | Site: SHOULDER | Status: FUNCTIONAL

## 2021-02-08 DEVICE — STEM HUM L83MM DIA15MM MINI UNIV CO CHROM POR PRI PRESSFIT: Type: IMPLANTABLE DEVICE | Site: SHOULDER | Status: FUNCTIONAL

## 2021-02-08 DEVICE — SCR LCK 3.5 HEX 4.75X30 STRL -- COMPREHENSIVE: Type: IMPLANTABLE DEVICE | Site: SHOULDER | Status: FUNCTIONAL

## 2021-02-08 DEVICE — PIN STEINMANN RVRS STL THRD TP --: Type: IMPLANTABLE DEVICE | Status: FUNCTIONAL

## 2021-02-08 DEVICE — BASEPLT W/ADAPTER GLEN 25MM -- COMPREHENSIVE: Type: IMPLANTABLE DEVICE | Site: SHOULDER | Status: FUNCTIONAL

## 2021-02-08 DEVICE — SHOULDR S3 TOT ADV REVRS IMPL CAPPED S3: Type: IMPLANTABLE DEVICE | Status: FUNCTIONAL

## 2021-02-08 DEVICE — SCR LCK 3.5 HEX 4.75X25 STRL -- COMPREHENSIVE: Type: IMPLANTABLE DEVICE | Site: SHOULDER | Status: FUNCTIONAL

## 2021-02-08 DEVICE — TRAY HUM STD 40MM DIA -- COMPREHENSIVE: Type: IMPLANTABLE DEVICE | Site: SHOULDER | Status: FUNCTIONAL

## 2021-02-08 RX ORDER — ONDANSETRON 2 MG/ML
4 INJECTION INTRAMUSCULAR; INTRAVENOUS ONCE
Status: DISCONTINUED | OUTPATIENT
Start: 2021-02-08 | End: 2021-02-08 | Stop reason: HOSPADM

## 2021-02-08 RX ORDER — GLYCOPYRROLATE 0.2 MG/ML
INJECTION INTRAMUSCULAR; INTRAVENOUS AS NEEDED
Status: DISCONTINUED | OUTPATIENT
Start: 2021-02-08 | End: 2021-02-08 | Stop reason: HOSPADM

## 2021-02-08 RX ORDER — OXYCODONE AND ACETAMINOPHEN 5; 325 MG/1; MG/1
1 TABLET ORAL
Status: DISCONTINUED | OUTPATIENT
Start: 2021-02-08 | End: 2021-02-08 | Stop reason: HOSPADM

## 2021-02-08 RX ORDER — SODIUM CHLORIDE, SODIUM LACTATE, POTASSIUM CHLORIDE, CALCIUM CHLORIDE 600; 310; 30; 20 MG/100ML; MG/100ML; MG/100ML; MG/100ML
25 INJECTION, SOLUTION INTRAVENOUS CONTINUOUS
Status: DISCONTINUED | OUTPATIENT
Start: 2021-02-08 | End: 2021-02-08 | Stop reason: HOSPADM

## 2021-02-08 RX ORDER — DEXAMETHASONE SODIUM PHOSPHATE 4 MG/ML
INJECTION, SOLUTION INTRA-ARTICULAR; INTRALESIONAL; INTRAMUSCULAR; INTRAVENOUS; SOFT TISSUE
Status: SHIPPED | OUTPATIENT
Start: 2021-02-08 | End: 2021-02-08

## 2021-02-08 RX ORDER — EPHEDRINE SULFATE/0.9% NACL/PF 50 MG/5 ML
SYRINGE (ML) INTRAVENOUS AS NEEDED
Status: DISCONTINUED | OUTPATIENT
Start: 2021-02-08 | End: 2021-02-08 | Stop reason: HOSPADM

## 2021-02-08 RX ORDER — BUPIVACAINE HYDROCHLORIDE 5 MG/ML
INJECTION, SOLUTION EPIDURAL; INTRACAUDAL
Status: SHIPPED | OUTPATIENT
Start: 2021-02-08 | End: 2021-02-08

## 2021-02-08 RX ORDER — DIPHENHYDRAMINE HYDROCHLORIDE 50 MG/ML
12.5 INJECTION, SOLUTION INTRAMUSCULAR; INTRAVENOUS
Status: DISCONTINUED | OUTPATIENT
Start: 2021-02-08 | End: 2021-02-08 | Stop reason: HOSPADM

## 2021-02-08 RX ORDER — PROPOFOL 10 MG/ML
INJECTION, EMULSION INTRAVENOUS AS NEEDED
Status: DISCONTINUED | OUTPATIENT
Start: 2021-02-08 | End: 2021-02-08 | Stop reason: HOSPADM

## 2021-02-08 RX ORDER — ROCURONIUM BROMIDE 10 MG/ML
INJECTION, SOLUTION INTRAVENOUS AS NEEDED
Status: DISCONTINUED | OUTPATIENT
Start: 2021-02-08 | End: 2021-02-08 | Stop reason: HOSPADM

## 2021-02-08 RX ORDER — MIDAZOLAM HYDROCHLORIDE 1 MG/ML
INJECTION, SOLUTION INTRAMUSCULAR; INTRAVENOUS AS NEEDED
Status: DISCONTINUED | OUTPATIENT
Start: 2021-02-08 | End: 2021-02-08 | Stop reason: HOSPADM

## 2021-02-08 RX ORDER — CLINDAMYCIN PHOSPHATE 900 MG/50ML
900 INJECTION INTRAVENOUS ONCE
Status: COMPLETED | OUTPATIENT
Start: 2021-02-08 | End: 2021-02-08

## 2021-02-08 RX ORDER — CLINDAMYCIN HYDROCHLORIDE 300 MG/1
300 CAPSULE ORAL EVERY 6 HOURS
Qty: 28 CAP | Refills: 0 | Status: SHIPPED | OUTPATIENT
Start: 2021-02-08 | End: 2021-02-15

## 2021-02-08 RX ORDER — NALOXONE HYDROCHLORIDE 0.4 MG/ML
0.1 INJECTION, SOLUTION INTRAMUSCULAR; INTRAVENOUS; SUBCUTANEOUS
Status: DISCONTINUED | OUTPATIENT
Start: 2021-02-08 | End: 2021-02-08 | Stop reason: HOSPADM

## 2021-02-08 RX ORDER — OXYCODONE AND ACETAMINOPHEN 5; 325 MG/1; MG/1
2 TABLET ORAL
Status: DISCONTINUED | OUTPATIENT
Start: 2021-02-08 | End: 2021-02-08 | Stop reason: HOSPADM

## 2021-02-08 RX ORDER — BUPIVACAINE HYDROCHLORIDE 2.5 MG/ML
INJECTION, SOLUTION EPIDURAL; INFILTRATION; INTRACAUDAL AS NEEDED
Status: DISCONTINUED | OUTPATIENT
Start: 2021-02-08 | End: 2021-02-08 | Stop reason: HOSPADM

## 2021-02-08 RX ORDER — FENTANYL CITRATE 50 UG/ML
INJECTION, SOLUTION INTRAMUSCULAR; INTRAVENOUS AS NEEDED
Status: DISCONTINUED | OUTPATIENT
Start: 2021-02-08 | End: 2021-02-08 | Stop reason: HOSPADM

## 2021-02-08 RX ORDER — SUCCINYLCHOLINE CHLORIDE 20 MG/ML INJECTION SOLUTION
SOLUTION AS NEEDED
Status: DISCONTINUED | OUTPATIENT
Start: 2021-02-08 | End: 2021-02-08 | Stop reason: HOSPADM

## 2021-02-08 RX ORDER — MAGNESIUM SULFATE 100 %
4 CRYSTALS MISCELLANEOUS AS NEEDED
Status: DISCONTINUED | OUTPATIENT
Start: 2021-02-08 | End: 2021-02-08 | Stop reason: HOSPADM

## 2021-02-08 RX ORDER — SODIUM CHLORIDE 0.9 % (FLUSH) 0.9 %
5-40 SYRINGE (ML) INJECTION EVERY 8 HOURS
Status: DISCONTINUED | OUTPATIENT
Start: 2021-02-08 | End: 2021-02-08 | Stop reason: HOSPADM

## 2021-02-08 RX ORDER — LIDOCAINE HYDROCHLORIDE 20 MG/ML
INJECTION, SOLUTION EPIDURAL; INFILTRATION; INTRACAUDAL; PERINEURAL AS NEEDED
Status: DISCONTINUED | OUTPATIENT
Start: 2021-02-08 | End: 2021-02-08 | Stop reason: HOSPADM

## 2021-02-08 RX ORDER — OXYCODONE AND ACETAMINOPHEN 5; 325 MG/1; MG/1
1 TABLET ORAL
Qty: 30 TAB | Refills: 0 | Status: SHIPPED | OUTPATIENT
Start: 2021-02-08 | End: 2021-02-22

## 2021-02-08 RX ORDER — FENTANYL CITRATE 50 UG/ML
50 INJECTION, SOLUTION INTRAMUSCULAR; INTRAVENOUS AS NEEDED
Status: DISCONTINUED | OUTPATIENT
Start: 2021-02-08 | End: 2021-02-08 | Stop reason: HOSPADM

## 2021-02-08 RX ORDER — DEXTROSE 50 % IN WATER (D50W) INTRAVENOUS SYRINGE
25-50 AS NEEDED
Status: DISCONTINUED | OUTPATIENT
Start: 2021-02-08 | End: 2021-02-08 | Stop reason: HOSPADM

## 2021-02-08 RX ORDER — FENTANYL CITRATE 50 UG/ML
50 INJECTION, SOLUTION INTRAMUSCULAR; INTRAVENOUS
Status: DISCONTINUED | OUTPATIENT
Start: 2021-02-08 | End: 2021-02-08 | Stop reason: HOSPADM

## 2021-02-08 RX ORDER — SODIUM CHLORIDE 0.9 % (FLUSH) 0.9 %
5-40 SYRINGE (ML) INJECTION AS NEEDED
Status: DISCONTINUED | OUTPATIENT
Start: 2021-02-08 | End: 2021-02-08 | Stop reason: HOSPADM

## 2021-02-08 RX ORDER — FLUMAZENIL 0.1 MG/ML
0.2 INJECTION INTRAVENOUS
Status: DISCONTINUED | OUTPATIENT
Start: 2021-02-08 | End: 2021-02-08 | Stop reason: HOSPADM

## 2021-02-08 RX ORDER — ALBUTEROL SULFATE 0.83 MG/ML
2.5 SOLUTION RESPIRATORY (INHALATION)
Status: DISCONTINUED | OUTPATIENT
Start: 2021-02-08 | End: 2021-02-08 | Stop reason: HOSPADM

## 2021-02-08 RX ADMIN — FENTANYL CITRATE 100 MCG: 50 INJECTION, SOLUTION INTRAMUSCULAR; INTRAVENOUS at 10:36

## 2021-02-08 RX ADMIN — LIDOCAINE HYDROCHLORIDE 100 MG: 20 INJECTION, SOLUTION EPIDURAL; INFILTRATION; INTRACAUDAL; PERINEURAL at 12:15

## 2021-02-08 RX ADMIN — DEXAMETHASONE SODIUM PHOSPHATE 4 MG: 4 INJECTION, SOLUTION INTRAMUSCULAR; INTRAVENOUS at 09:40

## 2021-02-08 RX ADMIN — ROCURONIUM BROMIDE 30 MG: 10 INJECTION, SOLUTION INTRAVENOUS at 10:45

## 2021-02-08 RX ADMIN — Medication 10 MG: at 11:26

## 2021-02-08 RX ADMIN — PROPOFOL 50 MG: 10 INJECTION, EMULSION INTRAVENOUS at 10:45

## 2021-02-08 RX ADMIN — MIDAZOLAM 2 MG: 1 INJECTION INTRAMUSCULAR; INTRAVENOUS at 09:35

## 2021-02-08 RX ADMIN — BUPIVACAINE HYDROCHLORIDE 20 ML: 5 INJECTION, SOLUTION EPIDURAL; INTRACAUDAL at 09:40

## 2021-02-08 RX ADMIN — GLYCOPYRROLATE 0.4 MG: 0.2 INJECTION, SOLUTION INTRAMUSCULAR; INTRAVENOUS at 10:52

## 2021-02-08 RX ADMIN — SUGAMMADEX 200 MG: 100 INJECTION, SOLUTION INTRAVENOUS at 12:17

## 2021-02-08 RX ADMIN — Medication 15 MG: at 10:53

## 2021-02-08 RX ADMIN — Medication 100 MG: at 10:39

## 2021-02-08 RX ADMIN — SODIUM CHLORIDE, POTASSIUM CHLORIDE, SODIUM LACTATE AND CALCIUM CHLORIDE: 600; 310; 30; 20 INJECTION, SOLUTION INTRAVENOUS at 10:35

## 2021-02-08 RX ADMIN — SODIUM CHLORIDE, POTASSIUM CHLORIDE, SODIUM LACTATE AND CALCIUM CHLORIDE: 600; 310; 30; 20 INJECTION, SOLUTION INTRAVENOUS at 12:21

## 2021-02-08 RX ADMIN — CLINDAMYCIN IN 5 PERCENT DEXTROSE 900 MG: 18 INJECTION, SOLUTION INTRAVENOUS at 10:35

## 2021-02-08 RX ADMIN — PROPOFOL 150 MG: 10 INJECTION, EMULSION INTRAVENOUS at 10:39

## 2021-02-08 NOTE — OP NOTES
Operative Note    Patient: Veronica Wade. MRN: [id number]  Surgery Date: 2/8/2021  [unfilled]          Procedure  Primary Surgeon    RIGHT REVERSE SHOULDER  Lisandro Quiros MD    * Panel 2 does not exist *  * Panel 2 does not exist *    * Panel 3 does not exist *  * Panel 3 does not exist *     Surgeon(s) and Role:     * Lisandro Quiros MD - Primary    Other OR Staff/Assistants:  Circ-1: Krunal Reno RN  Scrub Tech-1: Chelsea Clcubas  Surg Asst-1: Monique Quiñones; Chente Bañuelos    1st Assistant Tasks:  Closing    Pre-operative Diagnosis:  Arthritis of right shoulder region [M19.011]    Post-operative Diagnosis: same as preop diagnosis    Anesthesia Type: General     Findings:     Complications: No    EBL: Minimal    Specimens: None    Procedure: Right reverse shoulder prosthesis replacement. Right upper extremity was prepped and draped in a sterile fashion after adequate anesthesia was given patient was placed in well-padded beachchair position deltopectoral approach was used and incision from just lateral to the coracoid to the axillary crease was made. Blunt dissection was performed down to the cephalic vein which was cauterized. Subsequently the humeral head was dislocated. Scar tissue was removed. Freehand cut of the humeral head is performed. Glenoid was exposed next. Excessive labral tissue was removed. Central pin was placed. Reaming was performed. Glenoid baseplate was impacted in place and 4 locking screws and 1 central nonlocking screw was placed. Glenosphere was impacted in place. Copious irrigation performed. Subsequently reaming and broaching of the humeral side was performed. Trial implants were placed patient was found of excellent intramotion with trial implants. All the humeral trial implants were removed. Copious irrigation performed. The stem and the cup were impacted in place. Subsequently the shoulder was reduced. Copious occasion was performed.   Skin was closed with Vicryl and Monocryl. Compressive dressing was applied. Patient was taken to PACU in stable condition and a pillow sling.

## 2021-02-08 NOTE — ANESTHESIA PROCEDURE NOTES
Peripheral Block    Start time: 2/8/2021 9:35 AM  End time: 2/8/2021 9:40 AM  Performed by: Linda Miguel CRNA  Authorized by: Linda Miguel CRNA       Pre-procedure: Indications: at surgeon's request and post-op pain management    Preanesthetic Checklist: patient identified, risks and benefits discussed, site marked, timeout performed, anesthesia consent given and patient being monitored    Timeout Time: 09:34          Block Type:   Block Type:   Interscalene  Laterality:  Right  Monitoring:  Standard ASA monitoring  Injection Technique:  Single shot  Procedures: ultrasound guided    Patient Position: supine  Prep: chlorhexidine    Needle Type:  Ultraplex  Needle Gauge:  21 G  Needle Localization:  Ultrasound guidance  Medication Injected:  Bupivacaine (PF) (MARCAINE) 0.5% injection, 20 mL  dexamethasone (DECADRON) 4 mg/mL injection, 4 mg  Med Admin Time: 2/8/2021 9:40 AM    Assessment:  Number of attempts:  1  Injection Assessment:  Incremental injection every 5 mL, negative aspiration for CSF and no paresthesia  Patient tolerance:  Patient tolerated the procedure well with no immediate complications

## 2021-02-08 NOTE — PROGRESS NOTES
P.T. screen performed to ensure pt could ambulate safely following reverse shoulder. He was able to stand and ambulate without assistance and does not require a P.T. evaluation at this time. Nursing notified.    Izaiah Mccormack, PT, DPT

## 2021-02-08 NOTE — DISCHARGE INSTRUCTIONS
Arthroscopic Surgery of the Shoulder #3 Post-op    1. Your first home we will should be clear liquids. 2.  An ice pack should be applied to the shoulder for at least 20 minutes 4 times a day or more often in the first 72 hours. Do not use heat. This may increase swelling and discomfort. 3.  Exercises are not necessary at this stage. You will be instructed on exercises during your visit to the office next week. 4.  A shoulder abduction pillow is provided for healing. Please wear it at all times. Do not try to hold it up without support. 5.  Your shoulder will be immobilized until your follow up appt. 6.  A prescription will be provided for pain before you are discharged. Do not take any aspirin for 1 week after surgery. Use plain Tylenol instead. Please inform the office of any known drug allergy. 7.  The dressing may be removed after 48 hours. Cover the incision with Band-Aids. 8.  You may shower 48 hours after surgery. Carefully dry the incisions and change Band-Aids daily after shower. 9.  You are not cleared to drive while your shoulder is immobilized. 10  Activities:  A. let pain be her guide to activity, too much pain equals too much activity  B. Some amount of swelling may be present postoperatively. Restrict activity if swelling is present. C.  Do not use exercise machines unless specifically instructed to. D.  Generally, if you have a job with little physical activity may return to work on the third postoperative day. E.  If your job requires excessive lifting or the use of your arm then discuss her return to work date with your provider. If you need refill of pain medication, call the office 2 business days prior to running out of medication. Please call during regular business hours, Medication refill requests will not be addressed during non-business hours. Please do not page the on-call provider for pain medication refills after hours.     [facility] INSTRUCTIONS:     DAY 1-3 WEAR CONTINUOUSLY WHILE AWAKE.  INSPECT SKIN EVERY 1-2 HOURS   MAKE SURE YOU PLACE A TOWEL IN BETWEEN SKIN AND POLAR PAD. DAY 4 AND ON USE AS NEEDED FOR PAIN CONTROL FOR 1 HOUR INTERVALS; NOT TO EXCEED 12 HOURS/DAY. 16. Your pain medicine is a Narcotic and may cause constipation. Things to watch for:             Increased swelling of the surgical site             Spreading of redness around the incision site             Drainage of pus from the incision site             Developing a fever of 101.5 °F or higher that does not respond to Tylenol               If any of these symptoms occur you have any questions please contact our office at [phone]. If you need to talk to Dr. Shai Alonso on an urgent basis please call the hospital at [phone]. [phone] for the . Please let the  know you are a surgical patient of Dr. Shai Alonso and you wish to get in contact with him. If Dr. Shai Alonso or his staff do not call you back within 30 minutes. Please tell the  to try again.

## 2021-02-08 NOTE — ANESTHESIA POSTPROCEDURE EVALUATION
Procedure(s):  RIGHT REVERSE SHOULDER.     general    Anesthesia Post Evaluation      Multimodal analgesia: multimodal analgesia used between 6 hours prior to anesthesia start to PACU discharge  Patient location during evaluation: PACU  Patient participation: complete - patient participated  Level of consciousness: awake and alert  Pain management: adequate  Airway patency: patent  Anesthetic complications: no  Cardiovascular status: acceptable and stable  Respiratory status: acceptable, spontaneous ventilation and room air  Hydration status: acceptable  Comments: Ok to discharge when post op criteria met  Post anesthesia nausea and vomiting:  none  Final Post Anesthesia Temperature Assessment:  Normothermia (36.0-37.5 degrees C)      INITIAL Post-op Vital signs:   Vitals Value Taken Time   /74 02/08/21 1236   Temp 36.1 °C (97 °F) 02/08/21 1236   Pulse 86 02/08/21 1236   Resp 16 02/08/21 1236   SpO2 94 % 02/08/21 1236

## 2021-02-08 NOTE — ANESTHESIA PREPROCEDURE EVALUATION
Relevant Problems   RENAL FAILURE   (+) Calculus of kidney       Anesthetic History   No history of anesthetic complications            Review of Systems / Medical History  Patient summary reviewed, nursing notes reviewed and pertinent labs reviewed    Pulmonary    COPD               Neuro/Psych   Within defined limits           Cardiovascular  Within defined limits                Exercise tolerance: >4 METS     GI/Hepatic/Renal  Within defined limits              Endo/Other    Diabetes    Arthritis     Other Findings              Physical Exam    Airway  Mallampati: II  TM Distance: 4 - 6 cm  Neck ROM: normal range of motion   Mouth opening: Normal     Cardiovascular  Regular rate and rhythm,  S1 and S2 normal,  no murmur, click, rub, or gallop  Rhythm: regular  Rate: normal         Dental  No notable dental hx       Pulmonary  Breath sounds clear to auscultation               Abdominal  GI exam deferred       Other Findings            Anesthetic Plan    ASA: 3  Anesthesia type: general      Post-op pain plan if not by surgeon: peripheral nerve block single    Induction: Intravenous  Anesthetic plan and risks discussed with: Patient

## 2021-02-15 ENCOUNTER — OFFICE VISIT (OUTPATIENT)
Dept: ORTHOPEDIC SURGERY | Age: 76
End: 2021-02-15
Payer: MEDICARE

## 2021-02-15 DIAGNOSIS — M19.011 PRIMARY OSTEOARTHRITIS OF RIGHT SHOULDER: Primary | ICD-10-CM

## 2021-02-15 PROCEDURE — 99024 POSTOP FOLLOW-UP VISIT: CPT | Performed by: ORTHOPAEDIC SURGERY

## 2021-02-15 RX ORDER — INSULIN DEGLUDEC INJECTION 100 U/ML
INJECTION, SOLUTION SUBCUTANEOUS
COMMUNITY
Start: 2021-02-03

## 2021-02-15 RX ORDER — PEN NEEDLE, DIABETIC 32GX 5/32"
NEEDLE, DISPOSABLE MISCELLANEOUS
COMMUNITY
Start: 2021-02-03

## 2021-02-15 RX ORDER — PIOGLITAZONEHYDROCHLORIDE 45 MG/1
TABLET ORAL
COMMUNITY
Start: 2021-01-04

## 2021-02-15 RX ORDER — EMPAGLIFLOZIN 25 MG/1
TABLET, FILM COATED ORAL
COMMUNITY
Start: 2021-01-29

## 2021-02-15 RX ORDER — TRAMADOL HYDROCHLORIDE 50 MG/1
50 TABLET ORAL
Qty: 30 TAB | Refills: 0 | Status: SHIPPED | OUTPATIENT
Start: 2021-02-15 | End: 2021-03-01

## 2021-02-15 NOTE — PROGRESS NOTES
Name: Michael Keller. : 1945     Service Dept: [address] [facility]    [address]'s Pharmacies:    [address] [address]  Phone: [phone] Fax: [phone]       Chief Complaint   Patient presents with    Shoulder Pain    Surgical Follow-up        There were no vitals taken for this visit. Allergies   Allergen Reactions    Penicillins Unknown (comments)     Other reaction(s): mild rash/itching      Current Outpatient Medications   Medication Sig Dispense Refill    traMADoL (ULTRAM) 50 mg tablet Take 1 Tab by mouth every six (6) hours as needed for Pain for up to 14 days. Max Daily Amount: 200 mg. 30 Tab 0    Radha Pleas FlexTouch U-100 100 unit/mL (3 mL) inpn inject 15 units subcutaneously daily      BD Maria 2nd Gen Pen Needle 32 gauge x 5/32\" ndle use 1 daily subcutaneously daily      Jardiance 25 mg tablet take 1 tablet by mouth once daily      pioglitazone (ACTOS) 45 mg tablet take 1 tablet by mouth once daily      acetaminophen (TYLENOL ARTHRITIS PAIN PO) Take 1,000 mg by mouth every six (6) hours as needed for Pain.  clindamycin (CLEOCIN) 300 mg capsule Take 1 Cap by mouth every six (6) hours for 7 days. 28 Cap 0    oxyCODONE-acetaminophen (Percocet) 5-325 mg per tablet Take 1 Tab by mouth every four to six (4-6) hours as needed for Pain for up to 14 days. Max Daily Amount: 6 Tabs. This Rx is to only be used after surgical procedure 30 Tab 0    vit A/vit C/vit E/zinc/copper (PRESERVISION AREDS PO) Take 1 Tab by mouth daily.  amiodarone (PACERONE) 100 mg tablet Take 50 mg by mouth daily. Indications: prevention of recurrent atrial fibrillation      linagliptin (TRADJENTA) 5 mg tablet Take 5 mg by mouth daily.  oxyCODONE-acetaminophen (PERCOCET) 5-325 mg per tablet Take 1 Tab by mouth every four (4) hours as needed for Pain. Max Daily Amount: 6 Tabs.  15 Tab 0    cyanocobalamin (B-12 DOTS) 500 mcg tablet Take 500 mcg by mouth daily.  ferrous sulfate (IRON) 325 mg (65 mg Iron) tablet Take  by mouth daily (before breakfast).  GLIPIZIDE (GLUCOTROL PO) Take  by mouth.  OMEPRAZOLE (PRILOSEC PO) Take  by mouth.  SIMVASTATIN (ZOCOR PO) Take  by mouth.  hydrochlorothiazide (HYDRODIURIL) 50 mg tablet Take 25 mg by mouth daily.         Patient Active Problem List   Diagnosis Code    Calculus of kidney N20.0    Hyperplasia of prostate with lower urinary tract symptoms (LUTS) N40.1    Knee osteoarthritis M17.10      Family History   Problem Relation Age of Onset    Diabetes Mother     Cancer Father     Cancer Other     Diabetes Other       Social History     Socioeconomic History    Marital status:      Spouse name: Not on file    Number of children: Not on file    Years of education: Not on file    Highest education level: Not on file   Tobacco Use    Smoking status: Former Smoker    Smokeless tobacco: Current User   Substance and Sexual Activity    Alcohol use: Not Currently     Alcohol/week: 0.8 standard drinks     Types: 1 Shots of liquor per week     Comment: seldom    Drug use: No    Sexual activity: Not Currently      Past Surgical History:   Procedure Laterality Date    CYSTOSCOPY  2/12/2009    Left ureteroplvic junction - stone removed with basket    HX BACK SURGERY      HX GASTRIC BYPASS  2005    HX ORTHOPAEDIC Left     shoulder sx    HX OTHER SURGICAL      Left shoulder    HX OTHER SURGICAL      4-5 Lumbar disc    HX UROLOGICAL      LA ANESTH,SURGERY OF SHOULDER      LA LAP,PYELOPLASTY      Left      Past Medical History:   Diagnosis Date    Arthritis     Calculus of kidney     Chronic obstructive pulmonary disease ([facility] [address] [facility]     Diabetes ([facility] [address] [facility]     Diabetes mellitus     Diverticulitis     Stricture or kinking of ureter     Unspecified hyperplasia of prostate with urinary obstruction and other lower urinary tract symptoms (LUTS)         I have reviewed and agree with PFSH and ROS and intake form in chart and the record furthermore I have reviewed prior medical record(s) regarding this patients care during this appointment. Review of Systems:   Patient is a pleasant appearing individual, appropriately dressed, well hydrated, well nourished, who is alert, appropriately oriented for age, and in no acute distress with a normal gait and normal affect who does not appear to be in any significant pain. Encounter Diagnoses     ICD-10-CM ICD-9-CM   1. Primary osteoarthritis of right shoulder  M19.011 715.11       HPI:  The patient is here status post right shoulder reverse prosthesis replacement. Surgery was 1 week ago, date of surgery of 2/8/2021. Assessment/Plan:  Plan at this point, my recommendation would be for physical therapy, ice, elevate, activities as tolerated, weightbearing started. We will see him back in about 5 to 6 weeks and go from there. Return to Office: Follow-up and Dispositions    · Return in about 6 weeks (around 3/29/2021). Scribed by Damien Garces MD as dictated by Yenifer Salter. Leslie Klein MD.  Documentation True and Accepted Fred Klein MD

## 2021-02-15 NOTE — PATIENT INSTRUCTIONS
Shoulder Pain: Care Instructions Your Care Instructions You can hurt your shoulder by using it too much during an activity, such as fishing or baseball. It can also happen as part of the everyday wear and tear of getting older. Shoulder injuries can be slow to heal, but your shoulder should get better with time. Your doctor may recommend a sling to rest your shoulder. If you have injured your shoulder, you may need testing and treatment. Follow-up care is a key part of your treatment and safety. Be sure to make and go to all appointments, and call your doctor if you are having problems. It's also a good idea to know your test results and keep a list of the medicines you take. How can you care for yourself at home? · Take pain medicines exactly as directed. ? If the doctor gave you a prescription medicine for pain, take it as prescribed. ? If you are not taking a prescription pain medicine, ask your doctor if you can take an over-the-counter medicine. ? Do not take two or more pain medicines at the same time unless the doctor told you to. Many pain medicines contain acetaminophen, which is Tylenol. Too much acetaminophen (Tylenol) can be harmful. · If your doctor recommends that you wear a sling, use it as directed. Do not take it off before your doctor tells you to. · Put ice or a cold pack on the sore area for 10 to 20 minutes at a time. Put a thin cloth between the ice and your skin. · If there is no swelling, you can put moist heat, a heating pad, or a warm cloth on your shoulder. Some doctors suggest alternating between hot and cold. · Rest your shoulder for a few days. If your doctor recommends it, you can then begin gentle exercise of the shoulder, but do not lift anything heavy. When should you call for help? Call 911 anytime you think you may need emergency care. For example, call if: 
  · You have chest pain or pressure. This may occur with: ? Sweating. ? Shortness of breath. ? Nausea or vomiting. ? Pain that spreads from the chest to the neck, jaw, or one or both shoulders or arms. ? Dizziness or lightheadedness. ? A fast or uneven pulse. After calling 911, chew 1 adult-strength aspirin. Wait for an ambulance. Do not try to drive yourself.  
  · Your arm or hand is cool or pale or changes color. Call your doctor now or seek immediate medical care if: 
  · You have signs of infection, such as: 
? Increased pain, swelling, warmth, or redness in your shoulder. ? Red streaks leading from a place on your shoulder. ? Pus draining from an area of your shoulder. ? Swollen lymph nodes in your neck, armpits, or groin. ? A fever. Watch closely for changes in your health, and be sure to contact your doctor if: 
  · You cannot use your shoulder.  
  · Your shoulder does not get better as expected. Where can you learn more? Go to http://www.gray.com/ Enter V243 in the search box to learn more about \"Shoulder Pain: Care Instructions. \" Current as of: March 2, 2020               Content Version: 12.6 © 4352-9402 [employer]. Care instructions adapted under license by [employer] (which disclaims liability or warranty for this information). If you have questions about a medical condition or this instruction, always ask your healthcare professional. [place] [address] any warranty or liability for your use of this information.

## 2021-02-24 ENCOUNTER — HOSPITAL ENCOUNTER (OUTPATIENT)
Dept: PHYSICAL THERAPY | Age: 76
Discharge: HOME | End: 2021-02-24
Payer: MEDICARE

## 2021-02-24 PROCEDURE — 97110 THERAPEUTIC EXERCISES: CPT | Performed by: PHYSICAL THERAPIST

## 2021-02-24 PROCEDURE — 97162 PT EVAL MOD COMPLEX 30 MIN: CPT | Performed by: PHYSICAL THERAPIST

## 2021-02-24 NOTE — PROGRESS NOTES
[facility]              [address]        [place], [address] [place]   [phone] fax    Plan of Care/ Statement of Necessity for Physical Therapy Services    Patient name: Faisal Velazco Start of Care: 2021   Referral source: Caterina Dorantes MD : 1945    Medical Diagnosis: Pain in right shoulder [M25.511]  Payor: Tianna Reed / Plan: Shahla Cotton / Product Type: [employer] Medicare /  Onset Date:DOS 21    Treatment Diagnosis: Right shoulder pain. Prior Hospitalization: see medical history Provider#: [id number]   Medications: Verified on Patient summary List    Comorbidities: Arthritis, BMI 30.7, Diabetes, prior surgery, Sleep dysfunction. Prior Level of Function: Independent,no limitations. Independent self care. The Plan of Care and following information is based on the information from the initial evaluation. Assessment/ key information: Patient with signs and symptoms consistent with right shoulder pain s/p reverese total shoulder following a fall and injury. Patient enters therapy wearing his sling. He has limited AROM and decreased strength. Precautions were reviewed. He has some tenderness at the proximal humerus. Functionally, he notes he is not able to do anything with his right arm. Patient will benefit from a program of skilled physical therapy to include therapeutic exercises to address strength deficits, therapeutic activities to improve functional mobility, neuromuscular reeducation to address balance, coordination and proprioception, manual therapy to address ROM and tissue extensibility and modalities as indicated. All questions were answered.     Evaluation Complexity History MEDIUM  Complexity : 1-2 comorbidities / personal factors will impact the outcome/ POC ; Examination MEDIUM Complexity : 3 Standardized tests and measures addressing body structure, function, activity limitation and / or participation in recreation  ;Presentation MEDIUM Complexity : Evolving with changing characteristics  ; Clinical Decision Making MEDIUM Complexity : FOTO score of 26-74  Overall Complexity Rating: MEDIUM  Problem List: pain affecting function, decrease ROM, decrease strength, decrease ADL/ functional abilitiies, decrease activity tolerance and decrease flexibility/ joint mobility   Treatment Plan may include any combination of the following: Therapeutic exercise, Therapeutic activities, Neuromuscular re-education, Physical agent/modality and Manual therapy  Patient / Family readiness to learn indicated by: asking questions, trying to perform skills and interest  Persons(s) to be included in education: patient (P)  Barriers to Learning/Limitations: None  Patient Goal (s): Get me back to normal  Patient Self Reported Health Status: fair  Rehabilitation Potential: good    Short Term Goals: To be accomplished in 1 weeks:  1. Patient will become proficient in their HEP and will be compliant in performing that program.  Evaluation:   Patient given a written/illustrated HEP. 2.  Patient will demonstrate PROM right shoulder 0-110 deg; abd 0-90 deg in order to increase functional mobility. Evaluation:  PROM right shoulder flex 0-90; abd 0-50 degrees. Long Term Goals: To be accomplished in 4 weeks:  1. Patient's pain level will be 0/10-3/10 with activity in order to improve patient's ability to perform normal ADLs. Evaluation:  0/10-10-10  2. Patient will demonstrate AROM right shoulder flex 0-100, scaption 0-85, IR 0-10, ER 0-20 to increase ease of ADLs. Evaluation:  ARoM left shoulder flex 0-135; abd 0-106. Right NT. 3. Patient will increase FOTO score to  64 to indicate increased functional mobility. Evaluation:  38  4. Patient will be able to reach to the bottom shelf of a kitchen cabinet in order to perform normal ADLs. Evaluation:  Notes, \"I can't do that\".     Frequency / Duration: Patient to be seen 3 times per week for 4 weeks. Patient/ Caregiver education and instruction: Diagnosis, prognosis, exercises   [x]  Plan of care has been reviewed with PTA      Certification Period: 2/24/2021 - 3/26/2021. Jhonny Fitzpatrick, PT 2/24/2021 10:51 AM  ________________________________________________________________________    I certify that the above Therapy Services are being furnished while the patient is under my care. I agree with the treatment plan and certify that this therapy is necessary.     [address] [place] [address] Signature:____________Date:_________TIME:________     Tristian Horta MD  ** Signature, Date and Time must be completed for valid certification **  Please sign and return to [facility] [address], [facility] [place]   [phone] fax

## 2021-02-24 NOTE — PROGRESS NOTES
PT DAILY TREATMENT NOTE/SHOULDER EVAL     Patient Name: Raquel Brown. Date:2021  : 1945  [x]  Patient  Verified  Payor: Quinn Wallace / Plan: Dexter Pablo / Product Type: Managed Care Medicare /    In time:11:02  Out time:11:45  Total Treatment Time (min): 43  Visit #: 1 of 12    Medicare/BCBS Only   Total Timed Codes (min):  23 1:1 Treatment Time:  20       Treatment Area: Pain in right shoulder [M25.511]    SUBJECTIVE  Pain Level (0-10 scale): 3-4/10 now;  0/10 best; 10/10. []constant []intermittent []improving []worsening []no change since onset    Any medication changes, allergies to medications, adverse drug reactions, diagnosis change, or new procedure performed?: [x] No    [] Yes (see summary sheet for update)  Subjective functional status/changes:     PLOF: Independent,no limitations. Independent self care. Limitations to PLOF: Wears sling intermittently. States he is unable to wear it all the time. Limits all his functional activities. Mechanism of Injury: Patient fell and injured the right shoulder. Reverse total shoulder. Current symptoms/Complaints: Patient reports intermittent pain and limited AROM with his right. States it feels weak and painful. 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 Pt Goals: \"Get me back to normal\"  Barriers: [x]pain []financial []time []transportation []other  Cognition: A & O x 3    Other:    OBJECTIVE/EXAMINATION  Domestic Life: Lives with his wife. Activity/Recreational Limitations: Limited ability to use his right arm. Mobility: ambulates without deviation. Self Care: Needs help with some dressing activities, some bathing. 20 min []Eval                  []Re-Eval       23 min Therapeutic Exercise:  [] See flow sheet :Emphasis on increasing PROM->AAROM right shoulder and wrist/elbow/hand strength. Rationale: increase ROM to improve the patients ability to increase tolerance to activity. With   [] TE   [] TA   [] neuro   [] other: Patient Education: [x] Review HEP    [] Progressed/Changed HEP based on:   [] positioning   [] body mechanics   [] transfers   [] heat/ice application    [] other:      Other Objective/Functional Measures:     Physical Therapy Evaluation - Shoulder    Posture: [] Poor    [x] Fair    [] Good    Describe: Moderate forward head posture, rounded shoulders with tight pectoral muscles. Scapulae are protracted. ROM:  [] Unable to assess at this time                                           AROM                                                              PROM   Left Right  Left Right   Flexion 0-135  Flexion  0-90           Scaption/ABD 0-106  Scaption/ABD  0-50   ER @ 0 Degrees   ER @ 0 Degrees  NT   ER @ 90 Degrees 0-75  ER @ 90 Degrees  NT   IR @ 90 Degrees 0-40  IR @ 90 Degrees  NT     End Feel / Painful Arc:    Strength:   [] Unable to assess at this time                                                                            L (1-5) R (1-5) Pain   Flexors  1 [] Yes   [] No   Abductors  1 [] Yes   [] No   External Rotators  1 [] Yes   [] No   Internal Rotators  1 [] Yes   [] No   Supraspinatus   [] Yes   [] No   Serratus Anterior   [] Yes   [] No   Lower Trapezius   [] Yes   [] No   Elbow Flexion  4 [] Yes   [] No   Elbow Extension  4 [] Yes   [] No       Scapulohumoral Control / Rhythm:  Able to eccentrically lower with good control?  Left: [x] Yes   [] No     Right: [] Yes   [x] No      Palpation  [] Min  [x] Mod  [] Severe    Location: proximal humerus    Deferred:  Adson's Test  [] Pos   [] Neg Yergason's Test [] Pos   [] Neg  Nanette's Test  [] Pos   [] Neg Piute's Sign [] Pos   [] Neg  Neer's Test  [] Pos   [] Neg Clunk Test  [] Pos   [] Neg  Hawkin's Test  [] Pos   [] Neg AC Joint  [] Pos   [] Neg  Speed's Test  [] Pos   [] Neg SC Joint  [] Pos   [] Neg  Empty Can  [x] Pos   [] Neg Pectoral Tightness [] Pos   [] Neg  Anterior Apprehension [] Pos   [] Neg   Posterior Apprehension [] Pos   [] Neg    Pain Level (0-10 scale) post treatment: 1    ASSESSMENT/Changes in Function: Patient with signs and symptoms consistent with right shoulder pain s/p reverese total shoulder following a fall and injury. Patient enters therapy wearing his sling. He has limited AROM and decreased strength. Precautions were reviewed. He has some tenderness at the proximal humerus. Functionally, he notes he is not able to do anything with his right arm. Patient will continue to benefit from skilled PT services to modify and progress therapeutic interventions, address functional mobility deficits, address ROM deficits, address strength deficits, analyze and address soft tissue restrictions, analyze and cue movement patterns, analyze and modify body mechanics/ergonomics and assess and modify postural abnormalities to attain remaining goals. [x]  See Plan of Care  []  See progress note/recertification  []  See Discharge Summary         Progress towards goals / Updated goals:  Short Term Goals: To be accomplished in 1 weeks:  1. Patient will become proficient in their HEP and will be compliant in performing that program.  Evaluation:   Patient given a written/illustrated HEP. 2.  Patient will demonstrate PROM right shoulder 0-110 deg; abd 0-90 deg in order to increase functional mobility. Evaluation:  PROM right shoulder flex 0-90; abd 0-50 degrees.     Long Term Goals: To be accomplished in 4 weeks:  1. Patient's pain level will be 0/10-3/10 with activity in order to improve patient's ability to perform normal ADLs. Evaluation:  0/10-10-10  2. Patient will demonstrate AROM right shoulder flex 0-100, scaption 0-85, IR 0-10, ER 0-20 to increase ease of ADLs. Evaluation:  ARoM left shoulder flex 0-135; abd 0-106. Right NT.   3. Patient will increase FOTO score to  64 to indicate increased functional mobility. Evaluation:  38  4. Patient will be able to reach to the bottom shelf of a kitchen cabinet in order to perform normal ADLs. Evaluation:  Notes, \"I can't do that\".     PLAN  [x]  Upgrade activities as tolerated     [x]  Continue plan of care  []  Update interventions per flow sheet       []  Discharge due to:_  []  Other:_      Sterling Camarena, PT 2/24/2021  11:03 AM

## 2021-02-26 ENCOUNTER — HOSPITAL ENCOUNTER (OUTPATIENT)
Dept: PHYSICAL THERAPY | Age: 76
Discharge: HOME | End: 2021-02-26
Payer: MEDICARE

## 2021-02-26 PROCEDURE — 97140 MANUAL THERAPY 1/> REGIONS: CPT

## 2021-02-26 PROCEDURE — 97110 THERAPEUTIC EXERCISES: CPT

## 2021-03-01 ENCOUNTER — HOSPITAL ENCOUNTER (OUTPATIENT)
Dept: PHYSICAL THERAPY | Age: 76
Discharge: HOME | End: 2021-03-01
Payer: MEDICARE

## 2021-03-01 PROCEDURE — 97110 THERAPEUTIC EXERCISES: CPT | Performed by: PHYSICAL THERAPIST

## 2021-03-01 PROCEDURE — 97140 MANUAL THERAPY 1/> REGIONS: CPT | Performed by: PHYSICAL THERAPIST

## 2021-03-01 NOTE — PROGRESS NOTES
PT DAILY TREATMENT NOTE     Patient Name: Kristina Salas. Date:3/1/2021  : 1945  [x]  Patient  Verified  Payor: Hayes Curling / Plan: [facility] / Product Type: Managed Care Medicare /    In time:2:00  Out time:2:56  Total Treatment Time (min): 64  Visit #: 3 of 12    Medicare/BCBS Only   Total Timed Codes (min):  46 1:1 Treatment Time:  46       Treatment Area: Pain in right shoulder [M25.511]    SUBJECTIVE  Pain Level (0-10 scale): 0-1  Any medication changes, allergies to medications, adverse drug reactions, diagnosis change, or new procedure performed?: [x] No    [] Yes (see summary sheet for update)  Subjective functional status/changes:   [] No changes reported  Patient reports yesterday was a \"bad day\" as he was busy. States he may have overdone his exercises on Saturday. He rested yesterday and notes almost no pain today. OBJECTIVE    Modality rationale: decrease inflammation and decrease pain to improve the patients ability to increase tolerance to activity.    Min Type Additional Details    [] Estim:  []Unatt       []IFC  []Premod                        []Other:  []w/ice   []w/heat  Position:  Location:    [] Estim: []Att    []TENS instruct  []NMES                    []Other:  []w/US   []w/ice   []w/heat  Position:  Location:    []  Traction: [] Cervical       []Lumbar                       [] Prone          []Supine                       []Intermittent   []Continuous Lbs:  [] before manual  [] after manual    []  Ultrasound: []Continuous   [] Pulsed                           []1MHz   []3MHz W/cm2:  Location:    []  Iontophoresis with dexamethasone         Location: [] Take home patch   [] In clinic   10 [x]  Ice     []  heat  []  Ice massage  []  Laser   []  Anodyne Position:sitting  Location: right shoulder    []  Laser with stim  []  Other:  Position:  Location:    []  Vasopneumatic Device Pressure:       [] lo [] med [] hi   Temperature: [] lo [] med [] hi [] Skin assessment post-treatment:  []intact []redness- no adverse reaction    []redness  adverse reaction:     36 min Therapeutic Exercise:  [] See flow sheet:  Emphasis on increasing ROM and strength right UE. Rationale: increase ROM and increase strength to improve the patients ability to increase tolerance to daily activity. 10 min Manual Therapy:  GH joint mobs in supine, grade I-II-III ant/post/inferior glides. Sidelying right scapular mobs. Manual stretching to increase elevation and rotation. The manual therapy interventions were performed at a separate and distinct time from the therapeutic activities interventions. Rationale: decrease pain, increase ROM and increase tissue extensibility to increase ease of motion to improve function. With   [] TE   [] TA   [] neuro   [] other: Patient Education: [x] Review HEP    [] Progressed/Changed HEP based on:   [] positioning   [] body mechanics   [] transfers   [] heat/ice application    [] other:      Other Objective/Functional Measures: Pain at end range IR and ER. Pain Level (0-10 scale) post treatment: 3    ASSESSMENT/Changes in Function: Patient with continued pain and decreased ROM right shoulder. Patient will continue to benefit from skilled PT services to modify and progress therapeutic interventions, address functional mobility deficits, address ROM deficits, address strength deficits, analyze and address soft tissue restrictions, analyze and cue movement patterns and assess and modify postural abnormalities to attain remaining goals. [x]  See Plan of Care  []  See progress note/recertification  []  See Discharge Summary         Progress towards goals / Updated goals:  Short Term Goals: To be accomplished in 1 weeks:  1.  Patient will become proficient in their HEP and will be compliant in performing that program.  Evaluation:   Patient given a written/illustrated HEP.   Current: 2/26/2021 - MET - patient reports daily compliance as prescribed  2.  Patient will demonstrate PROM right shoulder 0-110 deg; abd 0-90 deg in order to increase functional mobility. Evaluation:  PROM right shoulder flex 0-90; abd 0-50 degrees.     Long Term Goals: To be accomplished in 4 weeks:  1. Patient's pain level will be 0/10-3/10 with activity in order to improve patient's ability to perform normal ADLs. Evaluation:  0/10-10-10  Current:  0/10-8/10 (Sunday). 3/1/2021. Progressing. 2. Patient will demonstrate AROM right shoulder flex 0-100, scaption 0-85, IR 0-10, ER 0-20 to increase ease of ADLs. Evaluation:  ARoM left shoulder flex 0-135; abd 0-106.  Right NT. 3. Patient will increase FOTO score to  64 to indicate increased functional mobility. Evaluation:  38  4. Patient will be able to reach to the bottom shelf of a kitchen cabinet in order to perform normal ADLs. Evaluation:  Notes, \"I can't do that\".     PLAN  [x]  Upgrade activities as tolerated     [x]  Continue plan of care  []  Update interventions per flow sheet       []  Discharge due to:_  []  Other:_      Erika Kaiser, PT 3/1/2021  2:05 PM    Future Appointments   Date Time Provider Gonzalo Huerta   3/3/2021 11:45 AM Magdalene Loyd, PT [facility]   3/5/2021  1:15 PM Edison Chirinos, PTA [facility]   3/8/2021 12:30 PM Debbie Santos, PT [facility]   3/10/2021 12:30 PM Edison Chirinos, PTA [facility]   3/12/2021  1:15 PM Edison Chirinos, PTA [facility]   3/15/2021 12:30 PM Debbie Santos, PT [facility]   3/17/2021 12:30 PM Edison Chirinos, PTA [facility]   3/19/2021  1:15 PM Odbaltan Reasoner, PTA [facility]   3/22/2021 12:30 PM Debbie Rubins, PT [facility]   3/24/2021 12:30 PM Edison Reasoner, PTA [facility]   3/26/2021  1:15 PM Ricco Razae [facility]   3/29/2021 12:30 PM Debbie Rubins, PT [facility]   3/31/2021 12:30 PM Edison Reasoner, PTA [facility]   4/2/2021  1:15 PM Debbie Rubins, PT [facility]   4/5/2021 12:30 PM Debbie Rubins, PT [facility]   4/6/2021  1:10 PM Mohan Cornejo MD [facility]   4/7/2021 12:30 PM Bright Velazquez, PTA [facility]   4/9/2021  1:15 PM Kai Hernandez, PT [facility]

## 2021-03-03 ENCOUNTER — HOSPITAL ENCOUNTER (OUTPATIENT)
Dept: PHYSICAL THERAPY | Age: 76
Discharge: HOME | End: 2021-03-03
Payer: MEDICARE

## 2021-03-03 PROCEDURE — 97110 THERAPEUTIC EXERCISES: CPT

## 2021-03-03 PROCEDURE — 97140 MANUAL THERAPY 1/> REGIONS: CPT

## 2021-03-03 NOTE — PROGRESS NOTES
PT DAILY TREATMENT NOTE     Patient Name: Caitlyn Rivera. Date:3/3/2021  : 1945  [x]  Patient  Verified  Payor: Jericho Gale / Plan: [employer] / Product Type: Managed Care Medicare /    In ODCT:8207  Out time:1237  Total Treatment Time (min): 47  Visit #: 4 of 12    Medicare/BCBS Only   Total Timed Codes (min):  44 1:1 Treatment Time:  44       Treatment Area: Pain in right shoulder [M25.511]    SUBJECTIVE  Pain Level (0-10 scale): 1-2  Any medication changes, allergies to medications, adverse drug reactions, diagnosis change, or new procedure performed?: [x] No    [] Yes (see summary sheet for update)  Subjective functional status/changes:   [] No changes reported  Patient reports that today is the first day since surgery that his shoulder has felt better. OBJECTIVE    Modality rationale: decrease inflammation and decrease pain to improve the patients ability to improve ease with sleep   Min Type Additional Details   10 [x]  Ice     []  heat  []  Ice massage Position: Seated  Location: Right Shoulder, Post-tx     29 min Therapeutic Exercise:  [x] See flow sheet : Emphasis placed on improving available shoulder PROM/AAROM and promoting/maintaining right elbow/wrist/hand AROM and strength   Rationale: increase ROM and increase strength to improve the patients ability to improve ease with functional ADLs    15 min Manual Therapy:    Supine, Right Shoulder Passive Physiological Grade II-III Mobilization - Flexion, Scaption, Abduction   The manual therapy interventions were performed at a separate and distinct time from the therapeutic activities interventions.   Rationale: decrease pain, increase ROM and increase tissue extensibility to improve ease with independence with self-care ADLs          With   [] TE   [] TA   [] neuro   [] other: Patient Education: [x] Review HEP    [] Progressed/Changed HEP based on:   [] positioning   [] body mechanics   [] transfers   [] heat/ice application    [] other:      Other Objective/Functional Measures:   Right Shoulder Flexion PROM 112 degrees, Right Shoulder Abduction PROM 85 deg     Pain Level (0-10 scale) post treatment: 2    ASSESSMENT/Changes in Function: Significant objective improvement in available right shoulder PROM with abnormal empty end-feels noted at end-range motion. Patient does demonstrate subjective \"catching\" with right shoulder PROM ~60-80 degrees elevation within the sagittal and scapular plane with relief with slight internal rotation of the shoulder. Patient will continue to benefit from skilled PT services to modify and progress therapeutic interventions, address functional mobility deficits, address ROM deficits, address strength deficits, analyze and address soft tissue restrictions, analyze and cue movement patterns, analyze and modify body mechanics/ergonomics and assess and modify postural abnormalities to attain remaining goals. []  See Plan of Care  []  See progress note/recertification  []  See Discharge Summary         Progress towards goals / Updated goals:    Short Term Goals: To be accomplished in 1 weeks:  1.  Patient will become proficient in their HEP and will be compliant in performing that program.  Evaluation:   Patient given a written/illustrated HEP. Current: 2/26/2021 - MET - patient reports daily compliance as prescribed  2.  Patient will demonstrate PROM right shoulder 0-110 deg; abd 0-90 deg in order to increase functional mobility. Evaluation:  PROM right shoulder flex 0-90; abd 0-50 degrees. Current: Progressing, Right Shoulder Flexion PROM 112 degrees, Right Shoulder Abduction PROM 85 deg, 3/3/2021     Long Term Goals: To be accomplished in 4 weeks:  1. Patient's pain level will be 0/10-3/10 with activity in order to improve patient's ability to perform normal ADLs. Evaluation:  0/10-10-10  Current:  0/10-8/10 (Sunday). 3/1/2021. Progressing.   2. Patient will demonstrate AROM right shoulder flex 0-100, scaption 0-85, IR 0-10, ER 0-20 to increase ease of ADLs. Evaluation:  ARoM left shoulder flex 0-135; abd 0-106.  Right NT. 3. Patient will increase FOTO score to  64 to indicate increased functional mobility. Evaluation:  38  4. Patient will be able to reach to the bottom shelf of a kitchen cabinet in order to perform normal ADLs.   Evaluation:  Notes, \"I can't do that\".       PLAN  []  Upgrade activities as tolerated     []  Continue plan of care  []  Update interventions per flow sheet       []  Discharge due to:_  []  Other:_      Layne Hogan, PT 3/3/2021  8:29 AM    Future Appointments   Date Time Provider Gonzalo Huerta   3/3/2021 11:45 AM Thomas Janeing, PT [facility]   3/5/2021  1:15 PM Cherry Gasmen, PTA [facility]   3/8/2021 12:30 PM [facility], [facility]   3/10/2021 12:30 PM Cherry Gasmen, PTA [facility]   3/12/2021  1:15 PM Cherry Gasmen, PTA [facility]   3/15/2021 12:30 PM Kyle Brito, PT [facility]   3/17/2021 12:30 PM Cherry Gasmen, PTA [facility]   3/19/2021  1:15 PM Cherry Gasmen, PTA [facility]   3/22/2021 12:30 PM [facility]   3/24/2021 12:30 PM Cherry Gasmen, PTA [facility]   3/26/2021  1:15 PM Reanna Dumont [facility]   3/29/2021 12:30 PM Kyle Brito, PT [facility]   3/31/2021 12:30 PM Dilip Guillaume, PTA [facility]   4/2/2021  1:15 PM Kyle Brito, PT [facility]   4/5/2021 12:30 PM Kyle Brito, PT [facility]   4/6/2021  1:10 PM Noman Childress MD [facility]   4/7/2021 12:30 PM Dilip Guillaume, PTA [facility]   4/9/2021  1:15 PM Kyle Brito, PT [facility]

## 2021-03-05 ENCOUNTER — HOSPITAL ENCOUNTER (OUTPATIENT)
Dept: PHYSICAL THERAPY | Age: 76
Discharge: HOME | End: 2021-03-05
Payer: MEDICARE

## 2021-03-05 PROCEDURE — 97110 THERAPEUTIC EXERCISES: CPT

## 2021-03-05 PROCEDURE — 97140 MANUAL THERAPY 1/> REGIONS: CPT

## 2021-03-05 NOTE — PROGRESS NOTES
PT DAILY TREATMENT NOTE     Patient Name: Michael Keller. Date:3/5/2021  : 1945  [x]  Patient  Verified  Payor: Poncho Pascal / Plan: Joanne Khan / Product Type: Managed Care Medicare /    In time:1:14  Out time:2:08  Total Treatment Time (min): 54  Visit #: 5 of 12    Medicare/BCBS Only   Total Timed Codes (min):  44 1:1 Treatment Time:  44       Treatment Area: Pain in right shoulder [M25.511]    SUBJECTIVE  Pain Level (0-10 scale): 0  Any medication changes, allergies to medications, adverse drug reactions, diagnosis change, or new procedure performed?: [x] No    [] Yes (see summary sheet for update)  Subjective functional status/changes:   [] No changes reported  Patient reports that he has been wearing his sling more often now due to having increase in pain. OBJECTIVE            Modality rationale: decrease inflammation and decrease pain to improve the patients ability to improve ease with sleep   Min Type Additional Details    10 [x]? Ice     []?  heat  []? Ice massage Position: Seated  Location: Right Shoulder, Post-tx       34 min Therapeutic Exercise:  [x]? See flow sheet : Emphasis placed on improving available shoulder PROM/AAROM and promoting/maintaining right elbow/wrist/hand AROM and strength   Rationale: increase ROM and increase strength to improve the patients ability to improve ease with functional ADLs     10 min Manual Therapy:    Supine, Right Shoulder PROM  In flexion, abduction, scaption. The manual therapy interventions were performed at a separate and distinct time from the therapeutic activities interventions.   Rationale: decrease pain, increase ROM and increase tissue extensibility to improve ease with independence with self-care ADLs            With   [] TE   [] TA   [] neuro   [] other: Patient Education: [x] Review HEP    [] Progressed/Changed HEP based on:   [] positioning   [] body mechanics   [] transfers   [] heat/ice application [] other:      Other Objective/Functional Measures: supine wand flexion 90 deg. Pain Level (0-10 scale) post treatment: 2    ASSESSMENT/Changes in Function: Able to progress patient with AAROM with initiating supine wand exercises to increase mobility of right shoulder. Patient will continue to benefit from skilled PT services to modify and progress therapeutic interventions, address functional mobility deficits, address ROM deficits, address strength deficits and analyze and address soft tissue restrictions to attain remaining goals. []  See Plan of Care  []  See progress note/recertification  []  See Discharge Summary         Progress towards goals / Updated goals:  Short Term Goals: To be accomplished in 1 weeks:  1.  Patient will become proficient in their HEP and will be compliant in performing that program.  Evaluation:   Patient given a written/illustrated HEP. Current: 2/26/2021 - MET - patient reports daily compliance as prescribed  2.  Patient will demonstrate PROM right shoulder 0-110 deg; abd 0-90 deg in order to increase functional mobility. Evaluation:  PROM right shoulder flex 0-90; abd 0-50 degrees. Current: Progressing, Right Shoulder Flexion PROM 112 degrees, Right Shoulder Abduction PROM 85 deg, 3/3/2021     Long Term Goals: To be accomplished in 4 weeks:  1. Patient's pain level will be 0/10-3/10 with activity in order to improve patient's ability to perform normal ADLs. Evaluation:  0/10-10-10  Current:  0/10-8/10 (Sunday).  3/1/2021.  Progressing. 2. Patient will demonstrate AROM right shoulder flex 0-100, scaption 0-85, IR 0-10, ER 0-20 to increase ease of ADLs. Evaluation:  ARoM left shoulder flex 0-135; abd 0-106.  Right NT. 3. Patient will increase FOTO score to  64 to indicate increased functional mobility. Evaluation:  38  4. Patient will be able to reach to the bottom shelf of a kitchen cabinet in order to perform normal ADLs.   Evaluation:  Notes, \"I can't do that\".       PLAN  []  Upgrade activities as tolerated     [x]  Continue plan of care  []  Update interventions per flow sheet       []  Discharge due to:_  []  Other:_      Edith Abel, VIOLETTE 3/5/2021  1:19 PM    Future Appointments   Date Time Provider Gonzalo Huerta   3/8/2021 12:30 PM [place], PT [facility]   3/10/2021 12:30 PM Royetta Synagogue, PTA [facility]   3/12/2021  1:15 PM Royetta Synagogue, PTA [facility]   3/15/2021 12:30 PM [place] Gregory, PT [facility]   3/17/2021 12:30 PM Royetta Synagogue, PTA [facility]   3/19/2021  1:15 PM Royetta Synagogue, PTA [facility]   3/22/2021 12:30 PM [place] Gregory, PT [facility]   3/24/2021 12:30 PM Royetta Synagogue, PTA [facility]   3/26/2021  1:15 PM Julito Gomez [facility]   3/29/2021 12:30 PM [place] Gregory, PT [facility]   3/31/2021 12:30 PM Royetta Synagogue, PTA [facility]   4/2/2021  1:15 PM [place] Gregory, PT [facility]   4/5/2021 12:30 PM [place], PT [facility]   4/6/2021  1:10 PM Myra Patel MD [facility]   4/7/2021 12:30 PM Royetta Synagogue, PTA [facility]   4/9/2021  1:15 PM Graham Farmer, PT [facility]

## 2021-03-08 ENCOUNTER — TELEPHONE (OUTPATIENT)
Dept: ORTHOPEDIC SURGERY | Age: 76
End: 2021-03-08

## 2021-03-08 ENCOUNTER — HOSPITAL ENCOUNTER (OUTPATIENT)
Dept: PHYSICAL THERAPY | Age: 76
Discharge: HOME | End: 2021-03-08
Payer: MEDICARE

## 2021-03-08 DIAGNOSIS — M19.011 PRIMARY OSTEOARTHRITIS OF RIGHT SHOULDER: Primary | ICD-10-CM

## 2021-03-08 PROCEDURE — 97140 MANUAL THERAPY 1/> REGIONS: CPT | Performed by: PHYSICAL THERAPIST

## 2021-03-08 PROCEDURE — 97110 THERAPEUTIC EXERCISES: CPT | Performed by: PHYSICAL THERAPIST

## 2021-03-08 NOTE — PROGRESS NOTES
PT DAILY TREATMENT NOTE     Patient Name: Jonathan Muniz. Date:3/8/2021  : 1945  [x]  Patient  Verified  Payor: Natalee Jimenez / Plan: Darian Addison / Product Type: Managed Care Medicare /    In time:12:30  Out time:1:21  Total Treatment Time (min): 51  Visit #: 6 of 12    Medicare/BCBS Only   Total Timed Codes (min):  41 1:1 Treatment Time:  41       Treatment Area: Pain in right shoulder [M25.511]    SUBJECTIVE  Pain Level (0-10 scale): 0  Any medication changes, allergies to medications, adverse drug reactions, diagnosis change, or new procedure performed?: [x] No    [] Yes (see summary sheet for update)  Subjective functional status/changes:   [] No changes reported  Patient notes he can pull his pants up with both hands now. Was able to reach a band aid and remove it from his left arm. OBJECTIVE    Modality rationale: decrease edema, decrease inflammation and decrease pain to improve the patients ability to increase ease of motion to improve function.    Min Type Additional Details    [] Estim:  []Unatt       []IFC  []Premod                        []Other:  []w/ice   []w/heat  Position:  Location:    [] Estim: []Att    []TENS instruct  []NMES                    []Other:  []w/US   []w/ice   []w/heat  Position:  Location:    []  Traction: [] Cervical       []Lumbar                       [] Prone          []Supine                       []Intermittent   []Continuous Lbs:  [] before manual  [] after manual    []  Ultrasound: []Continuous   [] Pulsed                           []1MHz   []3MHz W/cm2:  Location:    []  Iontophoresis with dexamethasone         Location: [] Take home patch   [] In clinic   10 [x]  Ice     []  heat  []  Ice massage  []  Laser   []  Anodyne Position: sitting  Location: right shoulder.    []  Laser with stim  []  Other:  Position:  Location:    []  Vasopneumatic Device Pressure:       [] lo [] med [] hi   Temperature: [] lo [] med [] hi   [] Skin assessment post-treatment:  []intact []redness- no adverse reaction    []redness  adverse reaction:     31 min Therapeutic Exercise:  [] See flow sheet :Emphasis placed on improving available shoulder PROM/AAROM and promoting/maintaining right elbow/wrist/hand AROM and strength   Rationale: increase ROM and increase strength to improve the patients ability to increase ease with functional ADLs. 10 min Manual Therapy:  Manual stretching right shoulder to increase elevation and rotation. The manual therapy interventions were performed at a separate and distinct time from the therapeutic activities interventions. Rationale: increase ROM and increase tissue extensibility to increase ease of motion to improve function. With   [] TE   [] TA   [] neuro   [] other: Patient Education: [x] Review HEP    [] Progressed/Changed HEP based on:   [] positioning   [] body mechanics   [] transfers   [] heat/ice application    [] other:      Other Objective/Functional Measures: PROM right shoulder flexion 0-135, abduction 0-90. Pain Level (0-10 scale) post treatment:  3    ASSESSMENT/Changes in Function: Patient with subjective improvement in functional use of his right arm/shoulder. Patient will continue to benefit from skilled PT services to modify and progress therapeutic interventions, address functional mobility deficits, address ROM deficits, address strength deficits and analyze and address soft tissue restrictions to attain remaining goals. [x]  See Plan of Care  []  See progress note/recertification  []  See Discharge Summary         Progress towards goals / Updated goals:  Short Term Goals: To be accomplished in 1 weeks:  1.  Patient will become proficient in their HEP and will be compliant in performing that program.  Evaluation:   Patient given a written/illustrated HEP.   Current: 2/26/2021 - MET - patient reports daily compliance as prescribed  2.  Patient will demonstrate PROM right shoulder 0-110 deg; abd 0-90 deg in order to increase functional mobility. Evaluation:  PROM right shoulder flex 0-90; abd 0-50 degrees. Current: Progressing, Right Shoulder Flexion PROM 135 degrees, Right Shoulder Abduction PROM 90 deg, 3/8/2021     Long Term Goals: To be accomplished in 4 weeks:  1. Patient's pain level will be 0/10-3/10 with activity in order to improve patient's ability to perform normal ADLs. Evaluation:  0/10-10-10  Current:  0/10-6/10.  3/8/2021.  Progressing. 2. Patient will demonstrate AROM right shoulder flex 0-100, scaption 0-85, IR 0-10, ER 0-20 to increase ease of ADLs. Evaluation:  ARoM left shoulder flex 0-135; abd 0-106.  Right NT. 3. Patient will increase FOTO score to  64 to indicate increased functional mobility. Evaluation:  38  4. Patient will be able to reach to the bottom shelf of a kitchen cabinet in order to perform normal ADLs. Evaluation:  Notes, \"I can't do that\".     PLAN  [x]  Upgrade activities as tolerated     [x]  Continue plan of care  []  Update interventions per flow sheet       []  Discharge due to:_  []  Other:_      Romie Hidalgo PT 3/8/2021  12:42 PM    Future Appointments   Date Time Provider Gonzalo Huerta   3/10/2021 12:30 PM [facility]   3/12/2021  1:15 PM Akil Broderick PTA [facility]   3/15/2021 12:30 PM Nakul Carrera, PT [facility]   3/17/2021 12:30 PM Akil Broderick PTA [facility]   3/19/2021  1:15 PM Akil Broderick PTA [facility]   3/22/2021 12:30 PM Nakul Carrera, PT [facility]   3/24/2021 12:30 PM Jolyne Burger, PTA [facility]   3/26/2021  1:15 PM Pleas Bence [facility]   3/29/2021 12:30 PM Nakul Carrera, PT [facility]   3/31/2021 12:30 PM Akil Broderick, VIOLETTE [facility]   4/2/2021  1:15 PM Nakul Carrera, PT [facility]   4/5/2021 12:30 PM Nakul Carrera, PT [facility]   4/6/2021  1:10 PM Charles Roberson MD [facility]   4/7/2021 12:30 PM Akil Broderick, PTA [facility]   4/9/2021  1:15 PM Nakul Carrera, PT [facility]

## 2021-03-09 RX ORDER — TRAMADOL HYDROCHLORIDE 50 MG/1
50 TABLET ORAL
Qty: 30 TAB | Refills: 0 | Status: SHIPPED | OUTPATIENT
Start: 2021-03-09 | End: 2021-03-23

## 2021-03-10 ENCOUNTER — HOSPITAL ENCOUNTER (OUTPATIENT)
Dept: PHYSICAL THERAPY | Age: 76
Discharge: HOME | End: 2021-03-10
Payer: MEDICARE

## 2021-03-10 PROCEDURE — 97110 THERAPEUTIC EXERCISES: CPT

## 2021-03-10 PROCEDURE — 97140 MANUAL THERAPY 1/> REGIONS: CPT

## 2021-03-10 NOTE — PROGRESS NOTES
PT DAILY TREATMENT NOTE     Patient Name: Eligio Schmidt. Date:3/10/2021  : 1945  [x]  Patient  Verified  Payor: Rosalba Cisneros / Plan: [employer] / Product Type: Managed Care Medicare /    In time:12:26  Out time:1:18  Total Treatment Time (min): 52  Visit #: 7 of 12    Medicare/BCBS Only   Total Timed Codes (min):  42 1:1 Treatment Time:  42       Treatment Area: Pain in right shoulder [M25.511]    SUBJECTIVE  Pain Level (0-10 scale): 0  Any medication changes, allergies to medications, adverse drug reactions, diagnosis change, or new procedure performed?: [x] No    [] Yes (see summary sheet for update)  Subjective functional status/changes:   [] No changes reported  Patient reports that he confirmed with the doctor that he is suppose to be in a sling. Patient states he does not sleep with his sling. OBJECTIVE              Modality rationale: decrease inflammation and decrease pain to improve the patients ability to improve ease with sleep   Min Type Additional Details     10 [x]? ?  Ice     []? ?  heat  []? ?  Ice massage Position: Seated  Location: Right Shoulder, Post-tx        32 min Therapeutic Exercise:  [x]? ? See flow sheet : Emphasis placed on improving available shoulder PROM/AAROM and promoting/maintaining right elbow/wrist/hand AROM and strength   Rationale: increase ROM and increase strength to improve the patients ability to improve ease with functional ADLs     10 min Manual Therapy:    Supine, Right Shoulder PROM  In flexion, abduction, scaption. The manual therapy interventions were performed at a separate and distinct time from the therapeutic activities interventions.   Rationale: decrease pain, increase ROM and increase tissue extensibility to improve ease with independence with self-care ADLs               With   [] TE   [] TA   [] neuro   [] other: Patient Education: [x] Review HEP    [] Progressed/Changed HEP based on:   [] positioning   [] body mechanics   [] transfers   [] heat/ice application    [] other:      Other Objective/Functional Measures:   cues for patinet to stay in a pain free range with pulley in scapular plane,     Pain Level (0-10 scale) post treatment: 1-2    ASSESSMENT/Changes in Function: Patient continues to remain in the PROM and AAROM phase of the protocol until follow up on 4/6/21. Patient spoke with the doctor and confirmed that he should be wearing the sling 24/7. Patient will continue to benefit from skilled PT services to modify and progress therapeutic interventions, address functional mobility deficits, address ROM deficits, address strength deficits and analyze and address soft tissue restrictions to attain remaining goals. []  See Plan of Care  []  See progress note/recertification  []  See Discharge Summary         Progress towards goals / Updated goals:  Short Term Goals: To be accomplished in 1 weeks:  1.  Patient will become proficient in their HEP and will be compliant in performing that program.  Evaluation:   Patient given a written/illustrated HEP. Current: 2/26/2021 - MET - patient reports daily compliance as prescribed  2.  Patient will demonstrate PROM right shoulder 0-110 deg; abd 0-90 deg in order to increase functional mobility. Evaluation:  PROM right shoulder flex 0-90; abd 0-50 degrees. Current: Progressing, Right Shoulder Flexion PROM 135 degrees, Right Shoulder Abduction PROM 90 deg, 3/8/2021     Long Term Goals: To be accomplished in 4 weeks:  1. Patient's pain level will be 0/10-3/10 with activity in order to improve patient's ability to perform normal ADLs. Evaluation:  0/10-10-10  Current:  0/10-6/10.  3/8/2021.  Progressing. 2. Patient will demonstrate AROM right shoulder flex 0-100, scaption 0-85, IR 0-10, ER 0-20 to increase ease of ADLs. Evaluation:  ARoM left shoulder flex 0-135; abd 0-106.  Right NT.   3. Patient will increase FOTO score to  64 to indicate increased functional mobility. Evaluation:  38  4. Patient will be able to reach to the bottom shelf of a kitchen cabinet in order to perform normal ADLs.   Evaluation:  Notes, \"I can't do that\".       PLAN  []  Upgrade activities as tolerated     [x]  Continue plan of care  []  Update interventions per flow sheet       []  Discharge due to:_  []  Other:_      Edith Colten, PTA 3/10/2021  12:23 PM    Future Appointments   Date Time Provider Gonzalo Huerta   3/10/2021 12:30 PM Royetta Yazidism, PTA [facility]   3/12/2021  1:15 PM Royetta Yazidism, PTA [facility]   3/15/2021 12:30 PM [place] Selby, PT [facility]   3/17/2021 12:30 PM Royetta Yazidism, PTA [facility]   3/19/2021  1:15 PM Royetta Yazidism, PTA [facility]   3/22/2021 12:30 PM [place] Selby, PT [facility]   3/24/2021 12:30 PM Royetta Yazidism, PTA [facility]   3/26/2021  1:15 PM Julito Gomez [facility]   3/29/2021 12:30 PM [place] Selby, PT [facility]   3/31/2021 12:30 PM Royetta Yazidism, PTA [facility]   4/2/2021  1:15 PM [place] Selby, PT [facility]   4/5/2021 12:30 PM [place] Selby, PT [facility]   4/6/2021  1:10 PM Myra Patel MD [facility]   4/7/2021 12:30 PM Royetta Yazidism, PTA [facility]   4/9/2021  1:15 PM Graham Farmer PT [facility]

## 2021-03-12 ENCOUNTER — HOSPITAL ENCOUNTER (OUTPATIENT)
Dept: PHYSICAL THERAPY | Age: 76
Discharge: HOME | End: 2021-03-12
Payer: MEDICARE

## 2021-03-12 PROCEDURE — 97110 THERAPEUTIC EXERCISES: CPT

## 2021-03-12 PROCEDURE — 97140 MANUAL THERAPY 1/> REGIONS: CPT

## 2021-03-12 NOTE — PROGRESS NOTES
PT DAILY TREATMENT NOTE     Patient Name: Gage Monroe. Date:3/12/2021  : 1945  [x]  Patient  Verified  Payor: Rosalva Tejada / Plan: Brook Hutton / Product Type: Managed Care Medicare /    In time:1:12  Out time:2:05  Total Treatment Time (min): 53  Visit #: 8 of 12    Medicare/BCBS Only   Total Timed Codes (min):  43 1:1 Treatment Time:  43       Treatment Area: Pain in right shoulder [M25.511]    SUBJECTIVE  Pain Level (0-10 scale): 2  Any medication changes, allergies to medications, adverse drug reactions, diagnosis change, or new procedure performed?: [x] No    [] Yes (see summary sheet for update)  Subjective functional status/changes:   [] No changes reported  Patient reports his shoulder is doing well. OBJECTIVE            Modality rationale: decrease inflammation and decrease pain to improve the patients ability to improve ease with sleep   Min Type Additional Details     10 [x]? ??  Ice     []? ??  heat  []? ??  Ice massage Position: Seated  Location: Right Shoulder, Post-tx        33 min Therapeutic Exercise:  [x]? ?? See flow sheet : Emphasis placed on improving available shoulder PROM/AAROM and promoting/maintaining right elbow/wrist/hand AROM and strength   Rationale: increase ROM and increase strength to improve the patients ability to improve ease with functional ADLs     10 min Manual Therapy:    Supine, Right Shoulder PROM  In flexion, abduction, scaption, and ER. The manual therapy interventions were performed at a separate and distinct time from the therapeutic activities interventions.   Rationale: decrease pain, increase ROM and increase tissue extensibility to improve ease with independence with self-care ADLs            With   [] TE   [] TA   [] neuro   [] other: Patient Education: [x] Review HEP    [] Progressed/Changed HEP based on:   [] positioning   [] body mechanics   [] transfers   [] heat/ice application    [] other:      Other Objective/Functional Measures: initiated finger ladder with slight increase in discomfort. Pain Level (0-10 scale) post treatment: 0    ASSESSMENT/Changes in Function: Patient is unable to perform supine wand in scaption due to increase in pain. Patient has minimal guarding with PROM manual.     Patient will continue to benefit from skilled PT services to modify and progress therapeutic interventions, address functional mobility deficits, address ROM deficits, address strength deficits and analyze and address soft tissue restrictions to attain remaining goals. []  See Plan of Care  []  See progress note/recertification  []  See Discharge Summary         Progress towards goals / Updated goals:  Short Term Goals: To be accomplished in 1 weeks:  1.  Patient will become proficient in their HEP and will be compliant in performing that program.  Evaluation:   Patient given a written/illustrated HEP. Current: 2/26/2021 - MET - patient reports daily compliance as prescribed  2.  Patient will demonstrate PROM right shoulder 0-110 deg; abd 0-90 deg in order to increase functional mobility. Evaluation:  PROM right shoulder flex 0-90; abd 0-50 degrees. Current: Progressing, Right Shoulder Flexion PROM 135 degrees, Right Shoulder Abduction PROM 90 deg, 3/8/2021     Long Term Goals: To be accomplished in 4 weeks:  1. Patient's pain level will be 0/10-3/10 with activity in order to improve patient's ability to perform normal ADLs. Evaluation:  0/10-10-10  Current:  0/10-6/10.  3/8/2021.  Progressing. 2. Patient will demonstrate AROM right shoulder flex 0-100, scaption 0-85, IR 0-10, ER 0-20 to increase ease of ADLs. Evaluation:  ARoM left shoulder flex 0-135; abd 0-106.  Right NT. 3. Patient will increase FOTO score to  64 to indicate increased functional mobility. Evaluation:  38  4. Patient will be able to reach to the bottom shelf of a kitchen cabinet in order to perform normal ADLs.   Evaluation:  Notes, \"I can't do that\".       PLAN  []  Upgrade activities as tolerated     [x]  Continue plan of care  []  Update interventions per flow sheet       []  Discharge due to:_  []  Other:_      Haven Jean, PTA 3/12/2021  1:15 PM    Future Appointments   Date Time Provider Gonzalo Deanna   3/15/2021 12:30 PM Garold Collar, PT [facility]   3/17/2021 12:30 PM Karla Dock, PTA [facility]   3/19/2021  1:15 PM Karla Dock, PTA [facility]   3/22/2021 12:30 PM Garold Collar, PT [facility]   3/24/2021 12:30 PM Karla Dock, PTA [facility]   3/26/2021  1:15 PM Margaret Fowlere [facility]   3/29/2021 12:30 PM Garold Collar, PT [facility]   3/31/2021 12:30 PM Karla Dock, PTA [facility]   4/2/2021  1:15 PM Garold Collar, PT [facility]   4/5/2021 12:30 PM Garold Collar, PT [facility]   4/6/2021  1:10 PM Dimple Lazaro MD [facility]   4/7/2021 12:30 PM Karla Dock, PTA [facility]   4/9/2021  1:15 PM Garold Collar, PT [facility]

## 2021-03-15 ENCOUNTER — HOSPITAL ENCOUNTER (OUTPATIENT)
Dept: PHYSICAL THERAPY | Age: 76
Discharge: HOME | End: 2021-03-15
Payer: MEDICARE

## 2021-03-15 PROCEDURE — 97110 THERAPEUTIC EXERCISES: CPT | Performed by: PHYSICAL THERAPIST

## 2021-03-15 PROCEDURE — 97140 MANUAL THERAPY 1/> REGIONS: CPT | Performed by: PHYSICAL THERAPIST

## 2021-03-15 NOTE — PROGRESS NOTES
PT DAILY TREATMENT NOTE     Patient Name: Terri Smith. Date:3/15/2021  : 1945  [x]  Patient  Verified  Payor: Anish Phelps / Plan: [facility] / Product Type: Managed Care Medicare /    In time:12:30  Out time:1:20  Total Treatment Time (min): 50  Visit #: 9 of 12    Medicare/BCBS Only   Total Timed Codes (min):  40 1:1 Treatment Time:  40       Treatment Area: Pain in right shoulder [M25.511]    SUBJECTIVE  Pain Level (0-10 scale): 0  Any medication changes, allergies to medications, adverse drug reactions, diagnosis change, or new procedure performed?: [x] No    [] Yes (see summary sheet for update)  Subjective functional status/changes:   [] No changes reported  Patient continues to note right shoulder soreness. He continues use of the sling. OBJECTIVE    Modality rationale: decrease inflammation and decrease pain to improve the patients ability to increase ease of motion to improve function.      Min Type Additional Details    [] Estim:  []Unatt       []IFC  []Premod                        []Other:  []w/ice   []w/heat  Position:  Location:    [] Estim: []Att    []TENS instruct  []NMES                    []Other:  []w/US   []w/ice   []w/heat  Position:  Location:    []  Traction: [] Cervical       []Lumbar                       [] Prone          []Supine                       []Intermittent   []Continuous Lbs:  [] before manual  [] after manual    []  Ultrasound: []Continuous   [] Pulsed                           []1MHz   []3MHz W/cm2:  Location:    []  Iontophoresis with dexamethasone         Location: [] Take home patch   [] In clinic   10 [x]  Ice     []  heat  []  Ice massage  []  Laser   []  Anodyne Position: sitting  Location:right shoulder    []  Laser with stim  []  Other:  Position:  Location:    []  Vasopneumatic Device Pressure:       [] lo [] med [] hi   Temperature: [] lo [] med [] hi   [] Skin assessment post-treatment:  []intact []redness- no adverse reaction    []redness  adverse reaction:       30 min Therapeutic Exercise:  [] See flow sheet :Emphasis placed on improving available shoulder PROM/AAROM and promoting/maintaining right elbow/wrist/hand AROM and strength   Rationale: increase ROM and increase strength to improve the patients ability to increase ease with functional ADLs. 10 min Manual Therapy:  Right Shoulder PROM  In flexion, abduction, scaption, and ER. The manual therapy interventions were performed at a separate and distinct time from the therapeutic activities interventions. Rationale: decrease pain, increase ROM and increase tissue extensibility to progress to independence with self care ADLs. With   [] TE   [] TA   [] neuro   [] other: Patient Education: [x] Review HEP    [] Progressed/Changed HEP based on:   [] positioning   [] body mechanics   [] transfers   [] heat/ice application    [] other:      Other Objective/Functional Measures: Limited IR and ER. Pain Level (0-10 scale) post treatment: 3    ASSESSMENT/Changes in Function: Patient with improving elevation passively. Patient will continue to benefit from skilled PT services to modify and progress therapeutic interventions, address functional mobility deficits, address ROM deficits, address strength deficits and analyze and address soft tissue restrictions to attain remaining goals. [x]  See Plan of Care  []  See progress note/recertification  []  See Discharge Summary         Progress towards goals / Updated goals:  Short Term Goals: To be accomplished in 1 weeks:  1.  Patient will become proficient in their HEP and will be compliant in performing that program.  Evaluation:   Patient given a written/illustrated HEP. Current: 2/26/2021 - MET - patient reports daily compliance as prescribed  2.  Patient will demonstrate PROM right shoulder 0-110 deg; abd 0-90 deg in order to increase functional mobility.   Evaluation:  PROM right shoulder flex 0-90; abd 0-50 degrees. Current: Progressing, Right Shoulder Flexion PROM 135 degrees, Right Shoulder Abduction PROM 90 deg, 3/8/2021     Long Term Goals: To be accomplished in 4 weeks:  1. Patient's pain level will be 0/10-3/10 with activity in order to improve patient's ability to perform normal ADLs. Evaluation:  0/10-10-10  Current:  0/10-6/10.  3/8/2021.  Progressing. 2. Patient will demonstrate AROM right shoulder flex 0-100, scaption 0-85, IR 0-10, ER 0-20 to increase ease of ADLs. Evaluation:  ARoM left shoulder flex 0-135; abd 0-106.  Right NT. 3. Patient will increase FOTO score to  64 to indicate increased functional mobility. Evaluation:  38  4. Patient will be able to reach to the bottom shelf of a kitchen cabinet in order to perform normal ADLs. Evaluation:  Notes, \"I can't do that\".     PLAN  [x]  Upgrade activities as tolerated     [x]  Continue plan of care  []  Update interventions per flow sheet       []  Discharge due to:_  []  Other:_      Radha Martinez, PT 3/15/2021  12:46 PM    Future Appointments   Date Time Provider Gonzalo Huerta   3/17/2021 12:30 PM Leticia Floyd PTA [facility]   3/19/2021  1:15 PM Leticia Floyd PTA [facility]   3/22/2021 12:30 PM Florida Eisenberg PT [facility]   3/24/2021 12:30 PM Leticia Floyd PTA [facility]   3/26/2021  1:15 PM Tee Miliano [facility]   3/29/2021 12:30 PM Florida Eisenberg PT [facility]   3/31/2021 12:30 PM Leticia Floyd PTA [facility]   4/2/2021  1:15 PM Florida Neer, PT [facility]   4/5/2021 12:30 PM Florida Eisenberg, PT [facility]   4/6/2021  1:10 PM Yulissa Carrillo MD [facility]   4/7/2021 12:30 PM Leticia Floyd, VIOLETTE [facility]   4/9/2021  1:15 PM Florida Eisenberg, PT [facility]

## 2021-03-17 ENCOUNTER — HOSPITAL ENCOUNTER (OUTPATIENT)
Dept: PHYSICAL THERAPY | Age: 76
Discharge: HOME | End: 2021-03-17
Payer: MEDICARE

## 2021-03-17 PROCEDURE — 97112 NEUROMUSCULAR REEDUCATION: CPT

## 2021-03-17 PROCEDURE — 97110 THERAPEUTIC EXERCISES: CPT

## 2021-03-17 NOTE — PROGRESS NOTES
PT DAILY TREATMENT NOTE     Patient Name: Terri Smith. Date:3/17/2021  : 1945  [x]  Patient  Verified  Payor: Anish Phelps / Plan: [employer] [place] / Product Type: Managed Care Medicare /    In time:12:28  Out time:1:22  Total Treatment Time (min): 54  Visit #: 10 of 12    Medicare/BCBS Only   Total Timed Codes (min):  44 1:1 Treatment Time:  44       Treatment Area: Pain in right shoulder [M25.511]    SUBJECTIVE  Pain Level (0-10 scale): 0  Any medication changes, allergies to medications, adverse drug reactions, diagnosis change, or new procedure performed?: [x] No    [] Yes (see summary sheet for update)  Subjective functional status/changes:   [] No changes reported  Patient reports he can noticed that his shoulder is improving. OBJECTIVE              Modality rationale: decrease inflammation and decrease pain to improve the patients ability to improve ease with sleep   Min Type Additional Details     10 [x]? ???  Ice     []????  heat  []????  Ice massage Position: Seated  Location: Right Shoulder, Post-tx        34 min Therapeutic Exercise:  [x]? ??? See flow sheet : Emphasis placed on improving available shoulder PROM/AAROM and promoting/maintaining right elbow/wrist/hand AROM and strength   Rationale: increase ROM and increase strength to improve the patients ability to improve ease with functional ADLs     10 min Manual Therapy:    Supine, Right Shoulder PROM  In flexion, abduction, scaption, and ER. The manual therapy interventions were performed at a separate and distinct time from the therapeutic activities interventions.   Rationale: decrease pain, increase ROM and increase tissue extensibility to improve ease with independence with self-care ADLs           With   [] TE   [] TA   [] neuro   [] other: Patient Education: [x] Review HEP    [] Progressed/Changed HEP based on:   [] positioning   [] body mechanics   [] transfers   [] heat/ice application    [] other: Other Objective/Functional Measures: see goals     Pain Level (0-10 scale) post treatment: 2    ASSESSMENT/Changes in Function: Patient is progressing well with shoulder PROM per post surgical protocol. Patient reports being more compliant with sling and is not able to sleep in the bed. Patient continues to have increase in pain with AAROM movement in scapular plane. Patient will continue to benefit from skilled PT services to modify and progress therapeutic interventions, address functional mobility deficits, address ROM deficits, address strength deficits and analyze and address soft tissue restrictions to attain remaining goals. []  See Plan of Care  [x]  See progress note/recertification  []  See Discharge Summary         Progress towards goals / Updated goals:  Short Term Goals: To be accomplished in 1 weeks:  1.  Patient will become proficient in their HEP and will be compliant in performing that program.  Evaluation:   Patient given a written/illustrated HEP. Current: - MET - patient reports daily compliance as prescribed 2/26/2021  2.  Patient will demonstrate PROM right shoulder 0-110 deg; abd 0-90 deg in order to increase functional mobility. Evaluation:  PROM right shoulder flex 0-90; abd 0-50 degrees. Current: Goal met:  Right Shoulder Flexion PROM 135 degrees, Right Shoulder Abduction PROM 90 deg, 3/8/2021     Long Term Goals: To be accomplished in 4 weeks:  1. Patient's pain level will be 0/10-3/10 with activity in order to improve patient's ability to perform normal ADLs. Evaluation:  0/10-10-10  Current:  0/10-6/10.  3/8/2021.  Progressing. 2. Patient will demonstrate AROM right shoulder flex 0-100, scaption 0-85, IR 0-10, ER 0-20 to increase ease of ADLs. Evaluation:  ARoM left shoulder flex 0-135; abd 0-106.  Right NT. Current: NT due to protocol. 3/17/21  3. Patient will increase FOTO score to  64 to indicate increased functional mobility.   Evaluation:  38  Current: Regressed: FOTO = 36. 3/17/21  4. Patient will be able to reach to the bottom shelf of a kitchen cabinet in order to perform normal ADLs. Evaluation:  Notes, \"I can't do that\". Current: Unable to perform due to protocol.  3/17/21       PLAN  []  Upgrade activities as tolerated     [x]  Continue plan of care  []  Update interventions per flow sheet       []  Discharge due to:_  []  Other:_      Henrry Fierro PTA 3/17/2021  12:25 PM    Future Appointments   Date Time Provider Gonzalo Huerta   3/17/2021 12:30 PM Alana Chance, PTA [facility]   3/19/2021  1:15 PM Alana Chance, PTA [facility]   3/22/2021 12:30 PM Ora No, PT [facility]   3/24/2021 12:30 PM Alana Chance, PTA [facility]   3/26/2021  1:15 PM Irven Muster [facility]   3/29/2021 12:30 PM Ora No, PT [facility]   3/31/2021 12:30 PM Alana Chance, PTA [facility]   4/2/2021  1:15 PM Ora No, PT [facility]   4/5/2021 12:30 PM Ora No, PT [facility]   4/6/2021  1:10 PM Jaron Lopez MD [facility]   4/7/2021 12:30 PM Alana Chance, PTA [facility]   4/9/2021  1:15 PM Ora No, PT [facility]

## 2021-03-18 NOTE — PROGRESS NOTES
[facility]              [address]        [place], [address] [place]   [phone] fax    Continued Plan of Care/ Re-certification for Physical Therapy Services    Patient name: Vic Lyles Start of Care: 2021   Referral source: Valdez Rudolph MD : 1945   Medical/Treatment Diagnosis: Pain in right shoulder [M25.511]  Payor: Joaquin Palmer / Plan: Fabby López / Product Type: General [employer] Medicare /  Onset Date:DOS 2021     Prior Hospitalization: see medical history Provider#: [id number]   Medications: Verified on Patient Summary List    Comorbidities: Arthritis, BMI 30.7, Diabetes, prior surgery, Sleep dysfunction. Prior Level of Function: Independent,no limitations.  Independent self care. Visits from Start of Care: 10    Missed Visits: 1    The Plan of Care and following information is based on the patient's current status:  Short Term Goals: To be accomplished in 1 weeks:  1.  Patient will become proficient in their HEP and will be compliant in performing that program.  Evaluation:   Patient given a written/illustrated HEP. Current:   Patient reports daily compliance as prescribed 2021  Goal Met. 2.  Patient will demonstrate PROM right shoulder 0-110 deg; abd 0-90 deg in order to increase functional mobility. Evaluation:  PROM right shoulder flex 0-90; abd 0-50 degrees. Current:   Right Shoulder Flexion PROM 135 degrees, Right Shoulder Abduction PROM 90 deg, 3/8/2021  Goal met.     Long Term Goals: To be accomplished in 4 weeks:  1. Patient's pain level will be 0/10-3/10 with activity in order to improve patient's ability to perform normal ADLs. Evaluation:  0/10-10-10  Current:  0/10-6/10.  3/8/2021.    Progressing. 2. Patient will demonstrate AROM right shoulder flex 0-100, scaption 0-85, IR 0-10, ER 0-20 to increase ease of ADLs. Evaluation:  ARoM left shoulder flex 0-135; abd 0-106.  Right NT. Current: NT due to protocol. 3/17/21    3. Patient will increase FOTO score to  64 to indicate increased functional mobility. Evaluation:  38  Current: Regressed: FOTO = 36. 3/17/21    4. Patient will be able to reach to the bottom shelf of a kitchen cabinet in order to perform normal ADLs. Evaluation:  Notes, \"I can't do that\". Current: Unable to perform due to protocol. 3/17/21     Key functional changes: Patient has increased PROM and decreased subjective c/o pain. Problems/ barriers to goal attainment: None     Problem List: pain affecting function, decrease ROM, decrease strength, decrease ADL/ functional abilitiies, decrease activity tolerance and decrease flexibility/ joint mobility    Treatment Plan: Therapeutic exercise, Therapeutic activities, Neuromuscular re-education, Physical agent/modality and Manual therapy     Patient Goal (s) has been updated and includes:   See updated goals above. Goals for this certification period to be accomplished in 4 weeks:  1. Patient's pain level will be 0/10-3/10 with activity in order to improve patient's ability to perform normal ADLs. Evaluation:  0/10-10-10  PN:  0/10-6/10.  3/8/2021. Progressing. 2. Patient will demonstrate AROM right shoulder flex 0-100, scaption 0-85, IR 0-10, ER 0-20 to increase ease of ADLs. Evaluation:  ARoM left shoulder flex 0-135; abd 0-106.  Right NT. PN: NT due to protocol. 3/17/21    3. Patient will increase FOTO score to  64 to indicate increased functional mobility. Evaluation:  38  PN: Regressed: FOTO = 36. 3/17/21    4. Patient will be able to reach to the bottom shelf of a kitchen cabinet in order to perform normal ADLs. Evaluation:  Notes, \"I can't do that\". PN: Unable to perform due to protocol. 3/17/21    Frequency / Duration: Patient to be seen 2-3 times per week for 4 weeks:    Assessment / Recommendations: Patient is progressing well with shoulder PROM per post surgical protocol.  Patient reports being more compliant with sling and is not able to sleep in the bed. Patient continues to have increase in pain with AAROM movement in scapular plane. Certification Period: 3/18/2021 - 4/17/2021    Erika Kaiser, PT 3/18/2021 4:39 PM    ________________________________________________________________________  I certify that the above Therapy Services are being furnished while the patient is under my care. I agree with the treatment plan and certify that this therapy is necessary. [] I have read the above and request that my patient continue as recommended.   [] I have read the above report and request that my patient continue therapy with the following changes/special instructions: _______________________________________  [] I have read the above report and request that my patient be discharged from therapy    Physician's Signature:____________Date:_________TIME:________     Abhilash Eric MD  ** Signature, Date and Time must be completed for valid certification **  Please sign and return to [facility] [address] [place], [address] [place]   [phone] fax

## 2021-03-19 ENCOUNTER — HOSPITAL ENCOUNTER (OUTPATIENT)
Dept: PHYSICAL THERAPY | Age: 76
Discharge: HOME | End: 2021-03-19
Payer: MEDICARE

## 2021-03-19 PROCEDURE — 97140 MANUAL THERAPY 1/> REGIONS: CPT

## 2021-03-19 PROCEDURE — 97110 THERAPEUTIC EXERCISES: CPT

## 2021-03-19 NOTE — PROGRESS NOTES
PT DAILY TREATMENT NOTE     Patient Name: Ivory Rossi. Date:3/19/2021  : 1945  [x]  Patient  Verified  Payor: Tianna Reed / Plan: Shahla Cotton / Product Type: Managed Care Medicare /    In time:1:08  Out time:2:01  Total Treatment Time (min): 48  Visit #: 1 of 18    Medicare/BCBS Only   Total Timed Codes (min):  43 1:1 Treatment Time:  43       Treatment Area: Pain in right shoulder [M25.511]    SUBJECTIVE  Pain Level (0-10 scale): 0  Any medication changes, allergies to medications, adverse drug reactions, diagnosis change, or new procedure performed?: [x] No    [] Yes (see summary sheet for update)  Subjective functional status/changes:   [] No changes reported  Patient states he can lift his arm to put on deodorant with no pain. OBJECTIVE              Modality rationale: decrease inflammation and decrease pain to improve the patients ability to improve ease with sleep   Min Type Additional Details     10 [x]?????  Ice     []?????  heat  []?????  Ice massage Position: Seated  Location: Right Shoulder, Post-tx        33 min Therapeutic Exercise:  [x]? ???? See flow sheet : Emphasis placed on improving available shoulder PROM/AAROM and promoting/maintaining right elbow/wrist/hand AROM and strength   Rationale: increase ROM and increase strength to improve the patients ability to improve ease with functional ADLs     10 min Manual Therapy:    Supine, Right Shoulder PROM  In flexion, abduction, scaption, and ER. The manual therapy interventions were performed at a separate and distinct time from the therapeutic activities interventions.   Rationale: decrease pain, increase ROM and increase tissue extensibility to improve ease with independence with self-care ADLs         With   [] TE   [] TA   [] neuro   [] other: Patient Education: [x] Review HEP    [] Progressed/Changed HEP based on:   [] positioning   [] body mechanics   [] transfers   [] heat/ice application    [] other: Other Objective/Functional Measures: patient able to perform supine wand in scaption for 5 reps. Pain Level (0-10 scale) post treatment: 0    ASSESSMENT/Changes in Function: Continue to educate and remind patient he is not suppose to be actively moving his right arm and that he is only in PROM and AAROM phase of his protocol. Patient will continue to benefit from skilled PT services to modify and progress therapeutic interventions, address functional mobility deficits, address ROM deficits, address strength deficits and analyze and address soft tissue restrictions to attain remaining goals. []  See Plan of Care  []  See progress note/recertification  []  See Discharge Summary         Progress towards goals / Updated goals:  Short Term Goals: To be accomplished in 1 weeks:  1.  Patient will become proficient in their HEP and will be compliant in performing that program.  PN:  MET - patient reports daily compliance as prescribed 2/26/2021  2.  Patient will demonstrate PROM right shoulder 0-110 deg; abd 0-90 deg in order to increase functional mobility. PN:  Goal met:  Right Shoulder Flexion PROM 135 degrees, Right Shoulder Abduction PROM 90 deg, 3/8/2021     Long Term Goals: To be accomplished in 4 weeks:  1. Patient's pain level will be 0/10-3/10 with activity in order to improve patient's ability to perform normal ADLs. PN:   0/10-6/10.  3/8/2021.  Progressing. 2. Patient will demonstrate AROM right shoulder flex 0-100, scaption 0-85, IR 0-10, ER 0-20 to increase ease of ADLs. PN: : NT due to protocol. 3/17/21  3. Patient will increase FOTO score to  64 to indicate increased functional mobility. PN:  FOTO = 36. 3/17/21  4. Patient will be able to reach to the bottom shelf of a kitchen cabinet in order to perform normal ADLs. PN: Unable to perform due to protocol.  3/17/21       PLAN  []  Upgrade activities as tolerated     [x]  Continue plan of care  []  Update interventions per flow sheet       []  Discharge due to:_  []  Other:_      Cathie Cota, PTA 3/19/2021  1:17 PM    Future Appointments   Date Time Provider Gonzalo Huerta   3/22/2021 12:30 PM Kyle Brito, PT [facility]   3/24/2021 12:30 PM Dilip Guillaume, PTA [facility]   3/26/2021  1:15 PM Reanna Dumont [facility]   3/29/2021 12:30 PM Kyle Brito, PT [facility]   3/31/2021 12:30 PM Dilip Guillaume, PTA [facility]   4/2/2021  1:15 PM Kyle Brito, PT [facility]   4/5/2021 12:30 PM Kyle Brito, PT [facility]   4/6/2021  1:10 PM Noman Childress MD [facility]   4/7/2021 12:30 PM Dilip Guillaume, PTA [facility]   4/9/2021  1:15 PM Kyle Brito, PT [facility]

## 2021-03-22 ENCOUNTER — HOSPITAL ENCOUNTER (OUTPATIENT)
Dept: PHYSICAL THERAPY | Age: 76
Discharge: HOME | End: 2021-03-22
Payer: MEDICARE

## 2021-03-22 PROCEDURE — 97110 THERAPEUTIC EXERCISES: CPT | Performed by: PHYSICAL THERAPIST

## 2021-03-22 PROCEDURE — 97140 MANUAL THERAPY 1/> REGIONS: CPT | Performed by: PHYSICAL THERAPIST

## 2021-03-22 NOTE — PROGRESS NOTES
PT DAILY TREATMENT NOTE     Patient Name: Caitlyn Rivera. Date:3/22/2021  : 1945  [x]  Patient  Verified  Payor: Jericho Gale / Plan: Inaaya Divers / Product Type: Managed Care Medicare /    In time:12:24  Out time:1:17  Total Treatment Time (min): 48  Visit #: 2 of 18    Medicare/BCBS Only   Total Timed Codes (min):  43 1:1 Treatment Time:  43       Treatment Area: Pain in right shoulder [M25.511]    SUBJECTIVE  Pain Level (0-10 scale): 2-3  Any medication changes, allergies to medications, adverse drug reactions, diagnosis change, or new procedure performed?: [x] No    [] Yes (see summary sheet for update)  Subjective functional status/changes:   [] No changes reported  Patient states he awoke with pain yesterday morning, that it was very painful. Today he notes pain is 2-3/10. OBJECTIVE    Modality rationale: decrease inflammation and decrease pain to improve the patients ability to increase tolerance to motion.    Min Type Additional Details    [] Estim:  []Unatt       []IFC  []Premod                        []Other:  []w/ice   []w/heat  Position:  Location:    [] Estim: []Att    []TENS instruct  []NMES                    []Other:  []w/US   []w/ice   []w/heat  Position:  Location:    []  Traction: [] Cervical       []Lumbar                       [] Prone          []Supine                       []Intermittent   []Continuous Lbs:  [] before manual  [] after manual    []  Ultrasound: []Continuous   [] Pulsed                           []1MHz   []3MHz W/cm2:  Location:    []  Iontophoresis with dexamethasone         Location: [] Take home patch   [] In clinic   10 [x]  Ice     []  heat  []  Ice massage  []  Laser   []  Anodyne Position: sitting  Location:Right shoulder    []  Laser with stim  []  Other:  Position:  Location:    []  Vasopneumatic Device Pressure:       [] lo [] med [] hi   Temperature: [] lo [] med [] hi   [] Skin assessment post-treatment:  []intact []redness- no adverse reaction    []redness  adverse reaction:       33 min Therapeutic Exercise:  [] See flow sheet :Emphasis placed on improving available shoulder PROM/AAROM and promoting/maintaining right elbow/wrist/hand AROM and strength   Rationale: increase ROM and increase strength to improve the patients ability to improve ease with functional ADLs. 10 min Manual Therapy:  Right GH joint in supine, grade II-III anterior, inferior glides. Grade I distraction. The manual therapy interventions were performed at a separate and distinct time from the therapeutic activities interventions. Rationale: decrease pain, increase ROM and increase tissue extensibility to increase ease with return to independent self care ADLs. With   [] TE   [] TA   [] neuro   [] other: Patient Education: [x] Review HEP    [] Progressed/Changed HEP based on:   [] positioning   [] body mechanics   [] transfers   [] heat/ice application    [] other:      Other Objective/Functional Measures: Patient put forth greater effort to perform his newer exercises. Noted fatigue and lack of endurance versus pain as the limiting factor. Pain Level (0-10 scale) post treatment: 1-2/10    ASSESSMENT/Changes in Function: Patient continues with limited ROM and endurance to activity. Patient will continue to benefit from skilled PT services to modify and progress therapeutic interventions, address functional mobility deficits, address ROM deficits, address strength deficits and analyze and address soft tissue restrictions to attain remaining goals.      [x]  See Plan of Care  []  See progress note/recertification  []  See Discharge Summary         Progress towards goals / Updated goals:  Short Term Goals: To be accomplished in 1 weeks:  1.  Patient will become proficient in their HEP and will be compliant in performing that program.  PN:  MET - patient reports daily compliance as prescribed 2/26/2021  2.  Patient will demonstrate PROM right shoulder 0-110 deg; abd 0-90 deg in order to increase functional mobility. PN:  Goal met:  Right Shoulder Flexion PROM 135 degrees, Right Shoulder Abduction PROM 90 deg, 3/8/2021     Long Term Goals: To be accomplished in 4 weeks:  1. Patient's pain level will be 0/10-3/10 with activity in order to improve patient's ability to perform normal ADLs. PN:   0/10-6/10.  3/8/2021.  Progressing. 2. Patient will demonstrate AROM right shoulder flex 0-100, scaption 0-85, IR 0-10, ER 0-20 to increase ease of ADLs. PN: : NT due to protocol. 3/17/21  3. Patient will increase FOTO score to  64 to indicate increased functional mobility. PN:  FOTO = 36. 3/17/21  4. Patient will be able to reach to the bottom shelf of a kitchen cabinet in order to perform normal ADLs. PN: Unable to perform due to protocol.  3/17/21    PLAN  [x]  Upgrade activities as tolerated     [x]  Continue plan of care  []  Update interventions per flow sheet       []  Discharge due to:_  []  Other:_      Marilynn Burkitt, PT 3/22/2021  12:27 PM    Future Appointments   Date Time Provider Gonzalo Huerta   3/22/2021 12:30 PM Lissette Gabbie, PT [facility]   3/24/2021 12:30 PM Ogallah Ovid, PTA [facility]   3/26/2021  1:15 PM Melisa Singleton [facility]   3/29/2021 12:30 PM Lissette Hamburger, PT [facility]   3/31/2021 12:30 PM Ogallah Ovid, PTA [facility]   4/2/2021  1:15 PM Lissette Hamburger, PT [facility]   4/5/2021 12:30 PM [facility]ie, [facility]   4/6/2021  1:10 PM Demetra Severance, MD [facility]   4/7/2021 12:30 PM Ashish Rojas, PTA [facility]   4/9/2021  1:15 PM Lissette Cartwright, PT [facility]

## 2021-03-24 ENCOUNTER — HOSPITAL ENCOUNTER (OUTPATIENT)
Dept: PHYSICAL THERAPY | Age: 76
Discharge: HOME | End: 2021-03-24
Payer: MEDICARE

## 2021-03-24 PROCEDURE — 97110 THERAPEUTIC EXERCISES: CPT

## 2021-03-24 PROCEDURE — 97140 MANUAL THERAPY 1/> REGIONS: CPT

## 2021-03-24 NOTE — PROGRESS NOTES
PT DAILY TREATMENT NOTE     Patient Name: Luis Walker. Date:3/24/2021  : 1945  [x]  Patient  Verified  Payor: Kizzy Ocampo / Plan: [employer]ese[employer] Ware / Product Type: Managed Care Medicare /    In time:12:28  Out time:1:21  Total Treatment Time (min): 48  Visit #: 3 of 18    Medicare/BCBS Only   Total Timed Codes (min):  43 1:1 Treatment Time:  43       Treatment Area: Pain in right shoulder [M25.511]    SUBJECTIVE  Pain Level (0-10 scale): 0  Any medication changes, allergies to medications, adverse drug reactions, diagnosis change, or new procedure performed?: [x] No    [] Yes (see summary sheet for update)  Subjective functional status/changes:   [] No changes reported  Patient reports that he did not have any pain yesterday or today in his shoulder. OBJECTIVE                Modality rationale: decrease inflammation and decrease pain to improve the patients ability to improve ease with sleep   Min Type Additional Details     10 [x]??????  Ice     []??????  heat  []??????  Ice massage Position: Seated  Location: Right Shoulder, Post-tx        33 min Therapeutic Exercise:  [x]?????? See flow sheet : Emphasis placed on improving available shoulder PROM/AAROM/AROM and promoting/maintaining right elbow/wrist/hand AROM and strength   Rationale: increase ROM and increase strength to improve the patients ability to improve ease with functional ADLs     10 min Manual Therapy:    Supine, Right Shoulder PROM  In flexion, abduction, scaption, and ER. The manual therapy interventions were performed at a separate and distinct time from the therapeutic activities interventions.   Rationale: decrease pain, increase ROM and increase tissue extensibility to improve ease with independence with self-care ADLs            With   [] TE   [] TA   [] neuro   [] other: Patient Education: [x] Review HEP    [] Progressed/Changed HEP based on:   [] positioning   [] body mechanics   [] transfers   [] heat/ice application    [] other:      Other Objective/Functional Measures: AAROM with pulleys flexion 110 deg, scaption      Pain Level (0-10 scale) post treatment: 1    ASSESSMENT/Changes in Function: Initiated AROM exercises with clearance from MD.     Patient will continue to benefit from skilled PT services to modify and progress therapeutic interventions, address functional mobility deficits, address ROM deficits, address strength deficits and analyze and address soft tissue restrictions to attain remaining goals. []  See Plan of Care  []  See progress note/recertification  []  See Discharge Summary         Progress towards goals / Updated goals:  Short Term Goals: To be accomplished in 1 weeks:  1.  Patient will become proficient in their HEP and will be compliant in performing that program.  PN:  MET - patient reports daily compliance as prescribed 2/26/2021  2.  Patient will demonstrate PROM right shoulder 0-110 deg; abd 0-90 deg in order to increase functional mobility. PN:  Goal met:  Right Shoulder Flexion PROM 135 degrees, Right Shoulder Abduction PROM 90 deg, 3/8/2021     Long Term Goals: To be accomplished in 4 weeks:  1. Patient's pain level will be 0/10-3/10 with activity in order to improve patient's ability to perform normal ADLs. PN:   0/10-6/10.  3/8/2021.  Progressing. 2. Patient will demonstrate AROM right shoulder flex 0-100, scaption 0-85, IR 0-10, ER 0-20 to increase ease of ADLs. PN: : NT due to protocol. 3/17/21  3. Patient will increase FOTO score to  64 to indicate increased functional mobility. PN:  FOTO = 36. 3/17/21  4. Patient will be able to reach to the bottom shelf of a kitchen cabinet in order to perform normal ADLs. PN: Unable to perform due to protocol.  3/17/21    PLAN  []  Upgrade activities as tolerated     [x]  Continue plan of care  []  Update interventions per flow sheet       []  Discharge due to:_  []  Other:_      Claire Garcia PTA 3/24/2021  12:22 PM    Future Appointments   Date Time Provider Gonzalo Huerta   3/24/2021 12:30 PM Monico Thomson [facility]   3/26/2021  1:15 PM Kalina Blankenship [facility]   3/29/2021 12:30 PM Leti Evans, PT [facility]   3/31/2021 12:30 PM Jethro Schmidt, VIOLETTE [facility]   4/2/2021  1:15 PM Leti Evans, PT [facility]   4/5/2021 12:30 PM Leti Evans, PT [facility]   4/6/2021  1:10 PM Elizabeth Parson MD [facility]   4/7/2021 12:30 PM Jethro Schmidt, VIOLETTE [facility]   4/9/2021  1:15 PM Jethro Schmidt, VIOLETTE [facility]

## 2021-03-26 ENCOUNTER — HOSPITAL ENCOUNTER (OUTPATIENT)
Dept: PHYSICAL THERAPY | Age: 76
Discharge: HOME | End: 2021-03-26
Payer: MEDICARE

## 2021-03-26 PROCEDURE — 97140 MANUAL THERAPY 1/> REGIONS: CPT

## 2021-03-26 PROCEDURE — 97110 THERAPEUTIC EXERCISES: CPT

## 2021-03-26 NOTE — PROGRESS NOTES
PT DAILY TREATMENT NOTE     Patient Name: Yossi Freedman. Date:3/26/2021  : 1945  [x]  Patient  Verified  Payor: Ceferino Faria / Plan: Gabrielle Lou / Product Type: Managed Care Medicare /    In time:1:15 PM  Out time:2:13 PM  Total Treatment Time (min): 48  Visit #: 4 of 18    Medicare/BCBS Only   Total Timed Codes (min):  43 1:1 Treatment Time:  43       Treatment Area: Pain in right shoulder [M25.511]    SUBJECTIVE  Pain Level (0-10 scale): 0  Any medication changes, allergies to medications, adverse drug reactions, diagnosis change, or new procedure performed?: [x] No    [] Yes (see summary sheet for update)  Subjective functional status/changes:   [] No changes reported  \"I was a little sore after last time. \"    OBJECTIVE    Modality rationale: decrease edema, decrease inflammation and decrease pain to improve the patients ability to ease post session soreness   Min Type Additional Details    [] Estim:  []Unatt       []IFC  []Premod                        []Other:  []w/ice   []w/heat  Position:  Location:    [] Estim: []Att    []TENS instruct  []NMES                    []Other:  []w/US   []w/ice   []w/heat  Position:  Location:    []  Traction: [] Cervical       []Lumbar                       [] Prone          []Supine                       []Intermittent   []Continuous Lbs:  [] before manual  [] after manual    []  Ultrasound: []Continuous   [] Pulsed                           []1MHz   []3MHz W/cm2:  Location:    []  Iontophoresis with dexamethasone         Location: [] Take home patch   [] In clinic   10 [x]  Ice     []  heat  []  Ice massage  []  Laser   []  Anodyne Position: reclined  Location: right shoulder    []  Laser with stim  []  Other:  Position:  Location:    []  Vasopneumatic Device Pressure:       [] lo [] med [] hi   Temperature: [] lo [] med [] hi   [] Skin assessment post-treatment:  []intact []redness- no adverse reaction    []redness  adverse reaction: 33 min Therapeutic Exercise:  [x] See flow sheet :   Rationale: increase ROM, increase strength and improve coordination to improve the patients ability to progress per protocol      10 min Manual Therapy: In supine: PROM to right shoulder (per protocol), STM to right UT/LS    The manual therapy interventions were performed at a separate and distinct time from the therapeutic activities interventions. Rationale: decrease pain, increase ROM and increase tissue extensibility to increase ROM               With   [] TE   [] TA   [] neuro   [] other: Patient Education: [x] Review HEP    [] Progressed/Changed HEP based on:   [] positioning   [] body mechanics   [] transfers   [] heat/ice application    [] other:      Other Objective/Functional Measures:   Increased row reps with teal band     Pain Level (0-10 scale) post treatment: 2    ASSESSMENT/Changes in Function:   Patient gradually increasing right shoulder ROM, most difficulty noted with abduction and ER. Good carryover of cues noted. Continue per protocol. Patient will continue to benefit from skilled PT services to modify and progress therapeutic interventions, address functional mobility deficits, address ROM deficits, address strength deficits, analyze and address soft tissue restrictions, analyze and cue movement patterns, analyze and modify body mechanics/ergonomics and assess and modify postural abnormalities to attain remaining goals. []  See Plan of Care  []  See progress note/recertification  []  See Discharge Summary         Progress towards goals / Updated goals:  Short Term Goals: To be accomplished in 1 weeks:  1.  Patient will become proficient in their HEP and will be compliant in performing that program.  PN:  MET - patient reports daily compliance as prescribed 2/26/2021  2.  Patient will demonstrate PROM right shoulder 0-110 deg; abd 0-90 deg in order to increase functional mobility.   PN:  Goal met:  Right Shoulder Flexion PROM 135 degrees, Right Shoulder Abduction PROM 90 deg, 3/8/2021     Long Term Goals: To be accomplished in 4 weeks:  1. Patient's pain level will be 0/10-3/10 with activity in order to improve patient's ability to perform normal ADLs. PN:   0/10-6/10.  3/8/2021.  Progressing. 2. Patient will demonstrate AROM right shoulder flex 0-100, scaption 0-85, IR 0-10, ER 0-20 to increase ease of ADLs. PN: : NT due to protocol. 3/17/21  3. Patient will increase FOTO score to  64 to indicate increased functional mobility. PN:  FOTO = 36. 3/17/21  4. Patient will be able to reach to the bottom shelf of a kitchen cabinet in order to perform normal ADLs. PN: Unable to perform due to protocol.  3/17/21    PLAN  []  Upgrade activities as tolerated     [x]  Continue plan of care  []  Update interventions per flow sheet       []  Discharge due to:_  []  Other:_      Philip Schaeffer 3/26/2021  1:12 PM    Future Appointments   Date Time Provider Gonzalo Huerta   3/26/2021  1:15 PM Clara Beam [facility]   3/29/2021 12:30 PM Stanley Ramirez, PT [facility]   3/31/2021 12:30 PM Chely Schmidt, PTA [facility]   4/2/2021  1:15 PM Stanley Ramirez, PT [facility]   4/5/2021 12:30 PM [facility] Ramirez[facility]   4/6/2021  1:10 PM Glenn Soria MD [facility]   4/7/2021 12:30 PM Chely Schmidt, PTA [facility]   4/9/2021  1:15 PM Chely Schmidt, PTA [facility]

## 2021-03-29 ENCOUNTER — HOSPITAL ENCOUNTER (OUTPATIENT)
Dept: PHYSICAL THERAPY | Age: 76
Discharge: HOME | End: 2021-03-29
Payer: MEDICARE

## 2021-03-29 PROCEDURE — 97140 MANUAL THERAPY 1/> REGIONS: CPT | Performed by: PHYSICAL THERAPIST

## 2021-03-29 PROCEDURE — 97110 THERAPEUTIC EXERCISES: CPT | Performed by: PHYSICAL THERAPIST

## 2021-03-29 NOTE — PROGRESS NOTES
PT DAILY TREATMENT NOTE     Patient Name: Servando Veras. Date:3/29/2021  : 1945  [x]  Patient  Verified  Payor: Mason Hillman / Plan: [place] / Product Type: Managed Care Medicare /    In time:12:27  Out time:1:22  Total Treatment Time (min): 55  Visit #: 5 of 18    Medicare/BCBS Only   Total Timed Codes (min):  45 1:1 Treatment Time:  45       Treatment Area: Pain in right shoulder [M25.511]    SUBJECTIVE  Pain Level (0-10 scale): 0  Any medication changes, allergies to medications, adverse drug reactions, diagnosis change, or new procedure performed?: [x] No    [] Yes (see summary sheet for update)  Subjective functional status/changes:   [] No changes reported  Patient reports improved use of his right UE. He has been weaning from the sling, using it at home about \"half the time\". OBJECTIVE    Modality rationale: decrease inflammation and decrease pain to improve the patients ability to increase tolerance to movement.    Min Type Additional Details    [] Estim:  []Unatt       []IFC  []Premod                        []Other:  []w/ice   []w/heat  Position:  Location:    [] Estim: []Att    []TENS instruct  []NMES                    []Other:  []w/US   []w/ice   []w/heat  Position:  Location:    []  Traction: [] Cervical       []Lumbar                       [] Prone          []Supine                       []Intermittent   []Continuous Lbs:  [] before manual  [] after manual    []  Ultrasound: []Continuous   [] Pulsed                           []1MHz   []3MHz W/cm2:  Location:    []  Iontophoresis with dexamethasone         Location: [] Take home patch   [] In clinic   10 [x]  Ice     []  heat  []  Ice massage  []  Laser   []  Anodyne Position:sitting  Location:right shoulder    []  Laser with stim  []  Other:  Position:  Location:    []  Vasopneumatic Device Pressure:       [] lo [] med [] hi   Temperature: [] lo [] med [] hi   [] Skin assessment post-treatment: []intact []redness- no adverse reaction    []redness  adverse reaction:       35 min Therapeutic Exercise:  [] See flow sheet :Emphasis placed on improving available shoulder PROM/AAROM and promoting/maintaining right elbow/wrist/hand AROM and strength   Rationale: increase ROM and increase strength to improve the patients ability to increase ease with functional ADLs    10 min Manual Therapy:  Right GH joint in supine, grade II-III anterior, inferior glides. Grade I distraction. The manual therapy interventions were performed at a separate and distinct time from the therapeutic activities interventions. Rationale: decrease pain, increase ROM and increase tissue extensibility to increase ease of motion to improve function. With   [] TE   [] TA   [] neuro   [] other: Patient Education: [x] Review HEP    [] Progressed/Changed HEP based on:   [] positioning   [] body mechanics   [] transfers   [] heat/ice application    [] other:      Other Objective/Functional Measures: Patient with capsular end feel to IR and ER. Passive flexion is WFL. Pain Level (0-10 scale) post treatment: 1    ASSESSMENT/Changes in Function: Patient with improving subjective functional gains in mobility and decreased pain. Patient will continue to benefit from skilled PT services to modify and progress therapeutic interventions, address functional mobility deficits, address ROM deficits, address strength deficits and analyze and address soft tissue restrictions to attain remaining goals.      [x]  See Plan of Care  []  See progress note/recertification  []  See Discharge Summary         Progress towards goals / Updated goals:  Short Term Goals: To be accomplished in 1 weeks:  1.  Patient will become proficient in their HEP and will be compliant in performing that program.  PN:  MET - patient reports daily compliance as prescribed 2/26/2021  2.  Patient will demonstrate PROM right shoulder 0-110 deg; abd 0-90 deg in order to increase functional mobility. PN:  Goal met:  Right Shoulder Flexion PROM 135 degrees, Right Shoulder Abduction PROM 90 deg, 3/8/2021     Long Term Goals: To be accomplished in 4 weeks:  1. Patient's pain level will be 0/10-3/10 with activity in order to improve patient's ability to perform normal ADLs. PN:   0/10-6/10.  3/8/2021.    Current:  0/10-3/10.  3/29/21  2. Patient will demonstrate AROM right shoulder flex 0-100, scaption 0-85, IR 0-10, ER 0-20 to increase ease of ADLs. PN: : NT due to protocol. 3/17/21  3. Patient will increase FOTO score to  64 to indicate increased functional mobility. PN:  FOTO = 36. 3/17/21  4. Patient will be able to reach to the bottom shelf of a kitchen cabinet in order to perform normal ADLs. PN: Unable to perform due to protocol. 3/17/21  Current:  Unable to reach greater than parallel to the floor.   3/29/2021    PLAN  [x]  Upgrade activities as tolerated     [x]  Continue plan of care  []  Update interventions per flow sheet       []  Discharge due to:_  []  Other:_      Rebecca Moon, PT 3/29/2021  12:39 PM    Future Appointments   Date Time Provider Gonzalo Huerta   3/31/2021 12:30 PM Cydney Rizo PTA [facility]   4/2/2021  1:15 PM Tisha Javier, PT [facility]   4/5/2021 12:30 PM Tisha Javier, PT [facility]   4/6/2021  1:10 PM Tommy Lozoya MD [facility]   4/7/2021 12:30 PM [facility]E [facility]   4/9/2021  1:15 PM [facility]

## 2021-03-31 ENCOUNTER — HOSPITAL ENCOUNTER (OUTPATIENT)
Dept: PHYSICAL THERAPY | Age: 76
Discharge: HOME | End: 2021-03-31
Payer: MEDICARE

## 2021-03-31 PROCEDURE — 97140 MANUAL THERAPY 1/> REGIONS: CPT

## 2021-03-31 PROCEDURE — 97112 NEUROMUSCULAR REEDUCATION: CPT

## 2021-03-31 PROCEDURE — 97110 THERAPEUTIC EXERCISES: CPT

## 2021-03-31 NOTE — PROGRESS NOTES
PT DAILY TREATMENT NOTE     Patient Name: Antoinette Bateman. Date:3/31/2021  : 1945  [x]  Patient  Verified  Payor: Krishna Villareal / Plan: [address] [employer] / Product Type: Managed Care Medicare /    In time:12:30  Out time:1:23  Total Treatment Time (min): 48  Visit #: 6 of 18    Medicare/BCBS Only   Total Timed Codes (min):  43 1:1 Treatment Time:  43       Treatment Area: Pain in right shoulder [M25.511]    SUBJECTIVE  Pain Level (0-10 scale): 0  Any medication changes, allergies to medications, adverse drug reactions, diagnosis change, or new procedure performed?: [x] No    [] Yes (see summary sheet for update)  Subjective functional status/changes:   [] No changes reported  Patient reports he has not been wearing his sling as often. OBJECTIVE                Modality rationale: decrease inflammation and decrease pain to improve the patients ability to improve ease with sleep   Min Type Additional Details     10 [x]???????  Ice     []???????  heat  []???????  Ice massage Position: Seated  Location: Right Shoulder, Post-tx             23 min Therapeutic Exercise:  []? See flow sheet :Emphasis placed on improving available shoulder PROM/AAROM and promoting/maintaining right elbow/wrist/hand AROM and strength   Rationale: increase ROM and increase strength to improve the patients ability to increase ease with functional ADLs    10 min Neuromuscular Re-education:  [x]  See flow sheet :emphasis on scapular stability. Rationale: increase ROM and increase strength  to improve the patients ability to increase ease with overhead reaching.      10 min Manual Therapy:    Supine, Right Shoulder PROM  In flexion, abduction, scaption, and ER. The manual therapy interventions were performed at a separate and distinct time from the therapeutic activities interventions.   Rationale: decrease pain, increase ROM and increase tissue extensibility to improve ease with independence with self-care ADLs            With   [] TE   [] TA   [] neuro   [] other: Patient Education: [x] Review HEP    [] Progressed/Changed HEP based on:   [] positioning   [] body mechanics   [] transfers   [] heat/ice application    [] other:      Other Objective/Functional Measures:  AROM right shoulder flexion 0-75, abduction 0-75, IR (45 deg abd) 45 deg, ER (45 deg abd) 40 deg. Pain Level (0-10 scale) post treatment: 1    ASSESSMENT/Changes in Function: Patent continues to make steady progress with improving AROM of right shoulder. Patient will continue to benefit from skilled PT services to modify and progress therapeutic interventions, address functional mobility deficits, address ROM deficits, address strength deficits and analyze and address soft tissue restrictions to attain remaining goals. []  See Plan of Care  []  See progress note/recertification  []  See Discharge Summary         Progress towards goals / Updated goals:  Short Term Goals: To be accomplished in 1 weeks:  1.  Patient will become proficient in their HEP and will be compliant in performing that program.  PN:  MET - patient reports daily compliance as prescribed 2/26/2021  2.  Patient will demonstrate PROM right shoulder 0-110 deg; abd 0-90 deg in order to increase functional mobility. PN:  Goal met:  Right Shoulder Flexion PROM 135 degrees, Right Shoulder Abduction PROM 90 deg, 3/8/2021     Long Term Goals: To be accomplished in 4 weeks:  1. Patient's pain level will be 0/10-3/10 with activity in order to improve patient's ability to perform normal ADLs. PN:   0/10-6/10.  3/8/2021.    Current: Progressing: pain range 0/10-3/10.  3/29/21  2. Patient will demonstrate AROM right shoulder flex 0-100, scaption 0-85, IR 0-10, ER 0-20 to increase ease of ADLs. PN: : NT due to protocol. 3/17/21  Current: Progressing: AROM right shoulder flexion 0-75, abduction 0-75, IR (45 deg abd) 45 deg, ER (45 deg abd) 40 deg. 3/31/21  3.  Patient will increase FOTO score to  64 to indicate increased functional mobility. PN:  FOTO = 36. 3/17/21  4. Patient will be able to reach to the bottom shelf of a kitchen cabinet in order to perform normal ADLs. PN: Unable to perform due to protocol. 3/17/21  Current:  Unable to reach greater than parallel to the floor.   3/29/2021       PLAN  []  Upgrade activities as tolerated     [x]  Continue plan of care  []  Update interventions per flow sheet       []  Discharge due to:_  []  Other:_      Pietro Hill PTA 3/31/2021  12:31 PM    Future Appointments   Date Time Provider Gonzalo Huerta   4/2/2021  1:15 PM Irina Catalan PT [facility]   4/5/2021 12:30 PM Irina Catalan PT [facility]   4/6/2021  1:10 PM Home Chan MD [facility]   4/7/2021 12:30 PM Vahid Monique PTA [facility]   4/9/2021  1:15 PM Vahid Monique PTA [facility]

## 2021-04-02 ENCOUNTER — HOSPITAL ENCOUNTER (OUTPATIENT)
Dept: PHYSICAL THERAPY | Age: 76
Discharge: HOME | End: 2021-04-02
Payer: MEDICARE

## 2021-04-02 PROCEDURE — 97112 NEUROMUSCULAR REEDUCATION: CPT | Performed by: PHYSICAL THERAPIST

## 2021-04-02 PROCEDURE — 97110 THERAPEUTIC EXERCISES: CPT | Performed by: PHYSICAL THERAPIST

## 2021-04-02 PROCEDURE — 97140 MANUAL THERAPY 1/> REGIONS: CPT | Performed by: PHYSICAL THERAPIST

## 2021-04-02 NOTE — PROGRESS NOTES
PT DAILY TREATMENT NOTE     Patient Name: Dia Garcia. Date:2021  : 1945  [x]  Patient  Verified  Payor: Joann Burroughs / Plan: [employer] / Product Type: Managed Care Medicare /    In time:1:10  Out time:2:05  Total Treatment Time (min): 55  Visit #: 7 of 18    Medicare/BCBS Only   Total Timed Codes (min):  45 1:1 Treatment Time:  45       Treatment Area: Pain in right shoulder [M25.511]    SUBJECTIVE  Pain Level (0-10 scale): 3  Any medication changes, allergies to medications, adverse drug reactions, diagnosis change, or new procedure performed?: [x] No    [] Yes (see summary sheet for update)  Subjective functional status/changes:   [] No changes reported  Patient with c/o post treatment soreness since his last visit. States he held on his HEP yesterday. He has been icing his shoulder. States he can reach the bottom shelf of the kitchen cabinet sometimes and finds he has to make a conscious decision to use the right arm versus the left. OBJECTIVE    Modality rationale: decrease pain to improve the patients ability to increase tolerance to activity.    Min Type Additional Details    [] Estim:  []Unatt       []IFC  []Premod                        []Other:  []w/ice   []w/heat  Position:  Location:    [] Estim: []Att    []TENS instruct  []NMES                    []Other:  []w/US   []w/ice   []w/heat  Position:  Location:    []  Traction: [] Cervical       []Lumbar                       [] Prone          []Supine                       []Intermittent   []Continuous Lbs:  [] before manual  [] after manual    []  Ultrasound: []Continuous   [] Pulsed                           []1MHz   []3MHz W/cm2:  Location:    []  Iontophoresis with dexamethasone         Location: [] Take home patch   [] In clinic   10 [x]  Ice     []  heat  []  Ice massage  []  Laser   []  Anodyne Position:sitting  Location:right shoulder    []  Laser with stim  []  Other:  Position:  Location:    [] Vasopneumatic Device Pressure:       [] lo [] med [] hi   Temperature: [] lo [] med [] hi   [] Skin assessment post-treatment:  []intact []redness- no adverse reaction    []redness  adverse reaction:       25 min Therapeutic Exercise:  [] See flow sheet :Emphasis placed on improving available shoulder PROM/AAROM and promoting/maintaining right elbow/wrist/hand AROM and strength   Rationale: increase ROM and increase strength to improve the patients ability to increase ease with functional ADLs. 10 min Neuromuscular Re-education:  []  See flow sheet :emphasis on recruitment and activation of scapular muscles in order to improve shoulder proprioception and kinesthetic awareness. Rationale: increase strength, improve coordination and increase proprioception  to improve the patients ability to increase ease with overhead reaching. 10 min Manual Therapy:  Right GH joint in supine, grade II-III anterior, inferior glides.  Grade I distraction. The manual therapy interventions were performed at a separate and distinct time from the therapeutic activities interventions. Rationale: decrease pain, increase ROM and increase tissue extensibility to improve ease with independence with self care ADLs. With   [] TE   [] TA   [] neuro   [] other: Patient Education: [x] Review HEP    [] Progressed/Changed HEP based on:   [] positioning   [] body mechanics   [] transfers   [] heat/ice application    [] other:      Other Objective/Functional Measures: Patient has functional forward elevation passively. Limited in rotation. Pain Level (0-10 scale) post treatment: 1-2    ASSESSMENT/Changes in Function: Patient with continued weakness in the right shoulder with decreased AROM.     Patient will continue to benefit from skilled PT services to modify and progress therapeutic interventions, address functional mobility deficits, address ROM deficits, address strength deficits and analyze and address soft tissue restrictions to attain remaining goals. [x]  See Plan of Care  []  See progress note/recertification  []  See Discharge Summary         Progress towards goals / Updated goals:  Short Term Goals: To be accomplished in 1 weeks:  1.  Patient will become proficient in their HEP and will be compliant in performing that program.  PN:  MET - patient reports daily compliance as prescribed 2/26/2021  2.  Patient will demonstrate PROM right shoulder 0-110 deg; abd 0-90 deg in order to increase functional mobility. PN:  Goal met:  Right Shoulder Flexion PROM 135 degrees, Right Shoulder Abduction PROM 90 deg, 3/8/2021     Long Term Goals: To be accomplished in 4 weeks:  1. Patient's pain level will be 0/10-3/10 with activity in order to improve patient's ability to perform normal ADLs. PN:   0/10-6/10.  3/8/2021.    Current: Progressing: pain range 0/10-3/10.  3/29/21  2. Patient will demonstrate AROM right shoulder flex 0-100, scaption 0-85, IR 0-10, ER 0-20 to increase ease of ADLs. PN: : NT due to protocol. 3/17/21  Current: Progressing: AROM right shoulder flexion 0-75, abduction 0-75, IR (45 deg abd) 45 deg, ER (45 deg abd) 40 deg. 3/31/21  3. Patient will increase FOTO score to  64 to indicate increased functional mobility. PN:  FOTO = 36. 3/17/21  4. Patient will be able to reach to the bottom shelf of a kitchen cabinet in order to perform normal ADLs. PN: Unable to perform due to protocol. 3/17/21  Current:  Notes he can reach the bottom shelf of a kitchen cabinet but defers still to the left. 4/2/2021.   Goal Met.       PLAN  [x]  Upgrade activities as tolerated     [x]  Continue plan of care  []  Update interventions per flow sheet       []  Discharge due to:_  []  Other:_      Cheri Claros PT 4/2/2021  1:14 PM    Future Appointments   Date Time Provider Gonzalo Huerta   4/2/2021  1:15 PM Hanna Le, PT [facility]   4/5/2021 12:30 PM Hanna Le, PT [facility]   4/6/2021  1:10 PM Gagandeep Godoy MD [facility]   4/7/2021 12:30 PM Shannon Jo PTA [facility]   4/9/2021  1:15 PM Shannon Jo PTA [facility]

## 2021-04-05 ENCOUNTER — HOSPITAL ENCOUNTER (OUTPATIENT)
Dept: PHYSICAL THERAPY | Age: 76
Discharge: HOME | End: 2021-04-05
Payer: MEDICARE

## 2021-04-05 PROBLEM — I48.91 ATRIAL FIBRILLATION (HCC): Status: ACTIVE | Noted: 2021-04-05

## 2021-04-05 PROBLEM — E11.3293 TYPE 2 DIABETES MELLITUS WITH MILD NONPROLIFERATIVE DIABETIC RETINOPATHY WITHOUT MACULAR EDEMA, BILATERAL (HCC): Status: ACTIVE | Noted: 2021-04-05

## 2021-04-05 PROBLEM — I73.9 PERIPHERAL VASCULAR DISEASE (HCC): Status: ACTIVE | Noted: 2021-04-05

## 2021-04-05 PROCEDURE — 97140 MANUAL THERAPY 1/> REGIONS: CPT | Performed by: PHYSICAL THERAPIST

## 2021-04-05 PROCEDURE — 97110 THERAPEUTIC EXERCISES: CPT | Performed by: PHYSICAL THERAPIST

## 2021-04-05 PROCEDURE — 97112 NEUROMUSCULAR REEDUCATION: CPT | Performed by: PHYSICAL THERAPIST

## 2021-04-05 NOTE — PROGRESS NOTES
PT DAILY TREATMENT NOTE     Patient Name: Jose Guaman. Date:2021  : 1945  [x]  Patient  Verified  Payor: Miranda Sutton / Plan: Via [employer] / Product Type: Managed Care Medicare /    In time:12:28  Out time:1:20  Total Treatment Time (min): 52  Visit #: 8 of 18    Medicare/BCBS Only   Total Timed Codes (min):  52 1:1 Treatment Time:  52       Treatment Area: Pain in right shoulder [M25.511]    SUBJECTIVE  Pain Level (0-10 scale): 0  Any medication changes, allergies to medications, adverse drug reactions, diagnosis change, or new procedure performed?: [x] No    [] Yes (see summary sheet for update)  Subjective functional status/changes:   [] No changes reported  Patient reports no significant pain but his CC is weakness and not being able to raise his arm up to shoulder height. OBJECTIVE      30 min Therapeutic Exercise:  [] See flow sheet :Emphasis placed on improving available shoulder PROM/AAROM and promoting/maintaining right elbow/wrist/hand AROM and strength   Rationale: increase ROM and increase strength to improve the patients ability to increase ease with functional ADLs. 10 min Neuromuscular Re-education:  []  See flow sheet :   Rationale: increase strength, improve coordination and increase proprioception  to improve the patients ability to increase ease with overhead lifting. 12 min Manual Therapy:  Right GH joint in supine, grade II-III anterior, inferior glides.  Grade I distraction   The manual therapy interventions were performed at a separate and distinct time from the therapeutic activities interventions. Rationale: decrease pain, increase ROM and increase tissue extensibility to increase ease of motion to improve function.           With   [] TE   [] TA   [] neuro   [] other: Patient Education: [x] Review HEP    [] Progressed/Changed HEP based on:   [] positioning   [] body mechanics   [] transfers   [] heat/ice application    [] other: Other Objective/Functional Measures: AROM right shoulder flexion 0-75, abduction 0-70, IR (45 deg abd) 45 deg, ER (45 deg abd) 40 deg. Pain Level (0-10 scale) post treatment: 0    ASSESSMENT/Changes in Function: Patient with continued functional weakness in the right shoulder. He has decreased pain. Patient will continue to benefit from skilled PT services to modify and progress therapeutic interventions, address functional mobility deficits, address ROM deficits, address strength deficits and analyze and address soft tissue restrictions to attain remaining goals. [x]  See Plan of Care  []  See progress note/recertification  []  See Discharge Summary         Progress towards goals / Updated goals:  Short Term Goals: To be accomplished in 1 weeks:  1.  Patient will become proficient in their HEP and will be compliant in performing that program.  PN:  MET - patient reports daily compliance as prescribed 2/26/2021  2.  Patient will demonstrate PROM right shoulder 0-110 deg; abd 0-90 deg in order to increase functional mobility. PN:  Goal met:  Right Shoulder Flexion PROM 135 degrees, Right Shoulder Abduction PROM 90 deg, 3/8/2021     Long Term Goals: To be accomplished in 4 weeks:  1. Patient's pain level will be 0/10-3/10 with activity in order to improve patient's ability to perform normal ADLs. PN:   0/10-6/10.  3/8/2021.    Current: Progressing: pain range 0/10-3/10.  4/5/2021. vvv                          2. Patient will demonstrate AROM right shoulder flex 0-100, scaption 0-85, IR 0-10, ER 0-20 to increase ease of ADLs. PN: : NT due to protocol. 3/17/21  Current: Progressing: AROM right shoulder flexion 0-75, abduction 0-70, IR (45 deg abd) 45 deg, ER (45 deg abd) 40 deg. 4/5/21  3. Patient will increase FOTO score to  64 to indicate increased functional mobility. PN:  FOTO = 36. 3/17/21  Current:  FOTO = 58.  4/5/2021. Progressing.   4. Patient will be able to reach to the bottom shelf of a kitchen cabinet in order to perform normal ADLs. PN: Unable to perform due to protocol. 3/17/21  Current:  Notes he can reach the bottom shelf of a kitchen cabinet but defers still to the left. 4/2/2021. Goal Met.     PLAN  [x]  Upgrade activities as tolerated     [x]  Continue plan of care  []  Update interventions per flow sheet       []  Discharge due to:_  []  Other:_      Shiva Zambrano, PT 4/5/2021  12:30 PM    Future Appointments   Date Time Provider Gonzalo Huerta   4/6/2021  1:10 PM Maurizio Addison MD [facility]   4/7/2021 12:30 PM Christiano Thomas, PTA [facility]   4/9/2021  1:15 PM Christiano Thomas PTA [facility]

## 2021-04-05 NOTE — PROGRESS NOTES
[facility]  [facility]  A Division of [facility]                        [address]               [place], [address]                  [phone] fax    Continued Plan of Care/ Re-certification for Physical Therapy Services    Patient name: Brooklynn Matthews Start of Care: 2021   Referral source: Cindy Sanchez MD : 1945   Medical/Treatment Diagnosis: Pain in right shoulder [M25.511]  Payor: Shaggy Dangelo / Plan: [employer] Bowels / Product Type: Managed Care Medicare /  Onset Date:DOS 2021     Prior Hospitalization: see medical history Provider#: [id number]   Medications: Verified on Patient Summary List    Comorbidities: Arthritis, BMI 30.7, Diabetes, prior surgery, Sleep dysfunction. Prior Level of Function: Independent,no limitations.  Independent self care.               Visits from Start of Care: 18    Missed Visits: 1    The Plan of Care and following information is based on the patient's current status:  Short Term Goals: To be accomplished in 1 weeks:  1.  Patient will become proficient in their HEP and will be compliant in performing that program.  PN:  MET - patient reports daily compliance as prescribed 2021  2.  Patient will demonstrate PROM right shoulder 0-110 deg; abd 0-90 deg in order to increase functional mobility. PN:  Goal met:  Right Shoulder Flexion PROM 135 degrees, Right Shoulder Abduction PROM 90 deg, 3/8/2021     Long Term Goals: To be accomplished in 4 weeks:  1. Patient's pain level will be 0/10-3/10 with activity in order to improve patient's ability to perform normal ADLs. PN:   0/10-6/10.  3/8/2021.    Current: Progressing: pain range 0/10-3/10.  2021. Goal met. 2. Patient will demonstrate AROM right shoulder flex 0-100, scaption 0-85, IR 0-10, ER 0-20 to increase ease of ADLs. PN: : NT due to protocol.  3/17/21  Current: Progressing: AROM right shoulder flexion 0-75, abduction 0-70, IR (45 deg abd) 45 deg, ER (45 deg abd) 40 deg. 4/5/21  3. Patient will increase FOTO score to  64 to indicate increased functional mobility. PN:  FOTO = 36. 3/17/21  Current:  FOTO = 58.  4/5/2021. Progressing. 4. Patient will be able to reach to the bottom shelf of a kitchen cabinet in order to perform normal ADLs. PN: Unable to perform due to protocol. 3/17/21  Current:  Notes he can reach the bottom shelf of a kitchen cabinet but defers still to the left.  4/2/2021.  Goal Met.     Key functional changes:  Patient has decreased pain and improved FOTO indicative of increase in his perceived functional activity level. Problems/ barriers to goal attainment: None     Problem List: decrease ROM, decrease strength, decrease ADL/ functional abilitiies, decrease activity tolerance and decrease flexibility/ joint mobility    Treatment Plan: Therapeutic exercise, Therapeutic activities, Neuromuscular re-education, Physical agent/modality and Manual therapy     Patient Goal (s) has been updated and includes:   See above for update goals. Goals for this certification period to be accomplished in 4 weeks:  1. Patient will demonstrate AROM right shoulder flex 0-100, scaption 0-85, IR 0-10, ER 0-20 to increase ease of ADLs. PN: Progressing: AROM right shoulder flexion 0-75, abduction 0-70, IR (45 deg abd) 45 deg, ER (45 deg abd) 40 deg. 4/5/21  2. Patient will increase FOTO score to  64 to indicate increased functional mobility. PN: FOTO = 58.  4/5/2021. Progressing. 3. Patient will be able to reach to a shelf overhead in order to perform normal ADLs. PN:  Unable to reach a shelf over shoulder height. 4.  Patient will demonstrate 4/5 strength on MMT in his available ROM in order to increase his functional activity level.   PN:  MMT elevation 2/5.     Frequency / Duration: Patient to be seen 2 times per week for 4 weeks:    Assessment / Recommendations: Patient has good passive motion but he lacks functional strength to perform his normal activities of daily living. Patient will continue to benefit from skilled PT services to modify and progress therapeutic interventions, address functional mobility deficits, address ROM deficits, address strength deficits and analyze and address soft tissue restrictions to attain remaining goals. Certification Period: 4/6/2021 - 5/5/2021    Chel Martino, PT 4/5/2021 5:24 PM    ________________________________________________________________________  I certify that the above Therapy Services are being furnished while the patient is under my care. I agree with the treatment plan and certify that this therapy is necessary. [] I have read the above and request that my patient continue as recommended.   [] I have read the above report and request that my patient continue therapy with the following changes/special instructions: _______________________________________  [] I have read the above report and request that my patient be discharged from therapy    Physician's Signature:____________Date:_________TIME:________     Lidia Muse MD  ** Signature, Date and Time must be completed for valid certification **  Please sign and return to [facility]  [address], [facility]   [phone] fax

## 2021-04-06 ENCOUNTER — OFFICE VISIT (OUTPATIENT)
Dept: ORTHOPEDIC SURGERY | Age: 76
End: 2021-04-06
Payer: MEDICARE

## 2021-04-06 DIAGNOSIS — M19.011 ARTHRITIS OF RIGHT SHOULDER REGION: Primary | ICD-10-CM

## 2021-04-06 PROCEDURE — 99024 POSTOP FOLLOW-UP VISIT: CPT | Performed by: ORTHOPAEDIC SURGERY

## 2021-04-06 NOTE — PATIENT INSTRUCTIONS
Shoulder Pain: Care Instructions Your Care Instructions You can hurt your shoulder by using it too much during an activity, such as fishing or baseball. It can also happen as part of the everyday wear and tear of getting older. Shoulder injuries can be slow to heal, but your shoulder should get better with time. Your doctor may recommend a sling to rest your shoulder. If you have injured your shoulder, you may need testing and treatment. Follow-up care is a key part of your treatment and safety. Be sure to make and go to all appointments, and call your doctor if you are having problems. It's also a good idea to know your test results and keep a list of the medicines you take. How can you care for yourself at home? · Take pain medicines exactly as directed. ? If the doctor gave you a prescription medicine for pain, take it as prescribed. ? If you are not taking a prescription pain medicine, ask your doctor if you can take an over-the-counter medicine. ? Do not take two or more pain medicines at the same time unless the doctor told you to. Many pain medicines contain acetaminophen, which is Tylenol. Too much acetaminophen (Tylenol) can be harmful. · If your doctor recommends that you wear a sling, use it as directed. Do not take it off before your doctor tells you to. · Put ice or a cold pack on the sore area for 10 to 20 minutes at a time. Put a thin cloth between the ice and your skin. · If there is no swelling, you can put moist heat, a heating pad, or a warm cloth on your shoulder. Some doctors suggest alternating between hot and cold. · Rest your shoulder for a few days. If your doctor recommends it, you can then begin gentle exercise of the shoulder, but do not lift anything heavy. When should you call for help? Call 911 anytime you think you may need emergency care. For example, call if: 
  · You have chest pain or pressure. This may occur with: ? Sweating. ? Shortness of breath.  
? Nausea or vomiting. ? Pain that spreads from the chest to the neck, jaw, or one or both shoulders or arms. ? Dizziness or lightheadedness. ? A fast or uneven pulse. After calling 911, chew 1 adult-strength aspirin. Wait for an ambulance. Do not try to drive yourself.  
  · Your arm or hand is cool or pale or changes color. Call your doctor now or seek immediate medical care if: 
  · You have signs of infection, such as: 
? Increased pain, swelling, warmth, or redness in your shoulder. ? Red streaks leading from a place on your shoulder. ? Pus draining from an area of your shoulder. ? Swollen lymph nodes in your neck, armpits, or groin. ? A fever. Watch closely for changes in your health, and be sure to contact your doctor if: 
  · You cannot use your shoulder.  
  · Your shoulder does not get better as expected. Where can you learn more? Go to http://www.gray.com/ Enter O660 in the search box to learn more about \"Shoulder Pain: Care Instructions. \" Current as of: November 16, 2020               Content Version: 12.8 © 6883-2086 [employer]. Care instructions adapted under license by [employer] (which disclaims liability or warranty for this information). If you have questions about a medical condition or this instruction, always ask your healthcare professional. [place] [address] any warranty or liability for your use of this information.

## 2021-04-06 NOTE — PROGRESS NOTES
Name: More Banks. : 1945     Service Dept: [facility]    Patient's Pharmacies:    [place] [address]  [address]  [address] [facility] [address]  Phone: [phone] Fax: [phone]       Chief Complaint   Patient presents with    Shoulder Pain    Surgical Follow-up        There were no vitals taken for this visit. Allergies   Allergen Reactions    Penicillins Unknown (comments)     Other reaction(s): mild rash/itching      Current Outpatient Medications   Medication Sig Dispense Refill    Tresiba FlexTouch U-100 100 unit/mL (3 mL) inpn inject 15 units subcutaneously daily      BD Maria 2nd Gen Pen Needle 32 gauge x \" ndle use 1 daily subcutaneously daily      Jardiance 25 mg tablet take 1 tablet by mouth once daily      pioglitazone (ACTOS) 45 mg tablet take 1 tablet by mouth once daily      acetaminophen (TYLENOL ARTHRITIS PAIN PO) Take 1,000 mg by mouth every six (6) hours as needed for Pain.  vit A/vit C/vit E/zinc/copper (PRESERVISION AREDS PO) Take 1 Tab by mouth daily.  amiodarone (PACERONE) 100 mg tablet Take 50 mg by mouth daily. Indications: prevention of recurrent atrial fibrillation      linagliptin (TRADJENTA) 5 mg tablet Take 5 mg by mouth daily.  oxyCODONE-acetaminophen (PERCOCET) 5-325 mg per tablet Take 1 Tab by mouth every four (4) hours as needed for Pain. Max Daily Amount: 6 Tabs. 15 Tab 0    cyanocobalamin (B-12 DOTS) 500 mcg tablet Take 500 mcg by mouth daily.  ferrous sulfate (IRON) 325 mg (65 mg Iron) tablet Take  by mouth daily (before breakfast).  GLIPIZIDE (GLUCOTROL PO) Take  by mouth.  OMEPRAZOLE (PRILOSEC PO) Take  by mouth.  SIMVASTATIN (ZOCOR PO) Take  by mouth.  hydrochlorothiazide (HYDRODIURIL) 50 mg tablet Take 25 mg by mouth daily.         Patient Active Problem List   Diagnosis Code    Calculus of kidney N20.0    Hyperplasia of prostate with lower urinary tract symptoms (LUTS) N40.1    Knee osteoarthritis M17.10    Atrial fibrillation (HCC) I48.91    Peripheral vascular disease (HCC) I73.9    Type 2 diabetes mellitus with mild nonproliferative diabetic retinopathy without macular edema, bilateral ([facility] [address] J64.9790      Family History   Problem Relation Age of Onset    Diabetes Mother     Cancer Father     Cancer Other     Diabetes Other       Social History     Socioeconomic History    Marital status:      Spouse name: Not on file    Number of children: Not on file    Years of education: Not on file    Highest education level: Not on file   Tobacco Use    Smoking status: Former Smoker    Smokeless tobacco: Current User   Substance and Sexual Activity    Alcohol use: Not Currently     Alcohol/week: 0.8 standard drinks     Types: 1 Shots of liquor per week     Comment: seldom    Drug use: No    Sexual activity: Not Currently      Past Surgical History:   Procedure Laterality Date    CYSTOSCOPY  2/12/2009    Left ureteroplvic junction - stone removed with basket    HX BACK SURGERY      HX GASTRIC BYPASS  2005    HX ORTHOPAEDIC Left     shoulder sx    HX OTHER SURGICAL      Left shoulder    HX OTHER SURGICAL      4-5 Lumbar disc    HX UROLOGICAL      [place] ANESTH,SURGERY OF SHOULDER      [place] LAP,PYELOPLASTY      Left      Past Medical History:   Diagnosis Date    Arthritis     Calculus of kidney     Chronic obstructive pulmonary disease ([facility] [address]     Diabetes ([facility] [address]     Diabetes mellitus     Diverticulitis     Stricture or kinking of ureter     Unspecified hyperplasia of prostate with urinary obstruction and other lower urinary tract symptoms (LUTS)         I have reviewed and agree with PFSH and ROS and intake form in chart and the record furthermore I have reviewed prior medical record(s) regarding this patients care during this appointment.      Review of Systems:   Patient is a pleasant appearing individual, appropriately dressed, well hydrated, well nourished, who is alert, appropriately oriented for age, and in no acute distress with a normal gait and normal affect who does not appear to be in any significant pain. Physical Exam:  Right Shoulder - Grossly neurovascularly intact. Range of motion-Decreased actively and passively. No Point tenderness, Strength-weakness with abduction, positive crepitation, No skin lesion are identified, No instabilty is noted, Positive apprehension, No Swelling. Left Shoulder - Grossly neurovascularly intact, Full Range of motion, No point tenderness, No weakness, No skin lesions, No Instability, No apprehension, No swelling. Encounter Diagnoses     ICD-10-CM ICD-9-CM   1. Arthritis of right shoulder region  M19.011 716.91       HPI:  The patient is here with a chief complaint of right shoulder pain, status post right shoulder reverse prosthesis replacement, doing well, has no complaints. Little bit of soreness. Surgery was on 2/8/2021. Assessment/Plan:  Plan at this point, yearly appointment. No heavy lifting. We will see him back as needed or on yearly appointment. As part of continued conservative pain management options the patient was advised to utilize Tylenol or OTC NSAIDS as long as it is not medically contraindicated. Return to Office: Follow-up and Dispositions    · Return in 1 year (on 4/6/2022). Scribed by Santy Crawford LPN as dictated by [facility] DAYNA Guerin MD.  Documentation True and Accepted Fred DAYNA Guerin MD

## 2021-04-07 ENCOUNTER — HOSPITAL ENCOUNTER (OUTPATIENT)
Dept: PHYSICAL THERAPY | Age: 76
Discharge: HOME | End: 2021-04-07
Payer: MEDICARE

## 2021-04-07 PROCEDURE — 97110 THERAPEUTIC EXERCISES: CPT

## 2021-04-07 PROCEDURE — 97112 NEUROMUSCULAR REEDUCATION: CPT

## 2021-04-07 NOTE — PROGRESS NOTES
PT DAILY TREATMENT NOTE     Patient Name: Fadumo Viera. Date:2021  : 1945  [x]  Patient  Verified  Payor: Romario Davis / Plan: Deana Parra / Product Type: Managed Care Medicare /    In time:12:30  Out time:1:25  Total Treatment Time (min): 55  Visit #: 1 of 8 (new re-cert)    Medicare/BCBS Only   Total Timed Codes (min):  45 1:1 Treatment Time:  45       Treatment Area: Pain in right shoulder [M25.511]    SUBJECTIVE  Pain Level (0-10 scale): 0  Any medication changes, allergies to medications, adverse drug reactions, diagnosis change, or new procedure performed?: [x] No    [] Yes (see summary sheet for update)  Subjective functional status/changes:   [] No changes reported  Patient reports that the doctor released him and told him to come back in a year. OBJECTIVE                   Modality rationale: decrease inflammation and decrease pain to improve the patients ability to improve ease with sleep   Min Type Additional Details     10 [x]????????  Ice     []????????  heat  []????????  Ice massage Position: Seated  Location: Right Shoulder, Post-tx              23 min Therapeutic Exercise:  []? ? See flow sheet :Emphasis placed on improving available shoulder PROM/AAROM and promoting/maintaining right elbow/wrist/hand AROM and strength   Rationale: increase ROM and increase strength to improve the patients ability to increase ease with functional ADLs     22 min Neuromuscular Re-education:  [x]? See flow sheet :emphasis on scapular stability. Rationale: increase ROM and increase strength  to improve the patients ability to increase ease with overhead reaching. With   [] TE   [] TA   [] neuro   [] other: Patient Education: [x] Review HEP    [] Progressed/Changed HEP based on:   [] positioning   [] body mechanics   [] transfers   [] heat/ice application    [] other:      Other Objective/Functional Measures: unable to place a cone on a shelf overhead. Pain Level (0-10 scale) post treatment: 1-2    ASSESSMENT/Changes in Function: Provided patient with updated HEP to initiate more strengthening exercises at home. Held on manual and focused on increase strength to prepare patient for DC in the future. Patient will continue to benefit from skilled PT services to modify and progress therapeutic interventions, address functional mobility deficits, address ROM deficits, address strength deficits and analyze and address soft tissue restrictions to attain remaining goals. []  See Plan of Care  []  See progress note/recertification  []  See Discharge Summary         Progress towards goals / Updated goals:  Short Term Goals: To be accomplished in 1 weeks:  1.  Patient will become proficient in their HEP and will be compliant in performing that program.  PN:  MET - patient reports daily compliance as prescribed 2/26/2021  2.  Patient will demonstrate PROM right shoulder 0-110 deg; abd 0-90 deg in order to increase functional mobility. PN:  Goal met:  Right Shoulder Flexion PROM 135 degrees, Right Shoulder Abduction PROM 90 deg, 3/8/2021     Long Term Goals: To be accomplished in 4 weeks:  1. Patient's pain level will be 0/10-3/10 with activity in order to improve patient's ability to perform normal ADLs. PN:  Goal met: pain range 0/10-3/10.  4/5/2021.                          2. Patient will demonstrate AROM right shoulder flex 0-100, scaption 0-85, IR 0-10, ER 0-20 to increase ease of ADLs. PN: AROM right shoulder flexion 0-75, abduction 0-70, IR (45 deg abd) 45 deg, ER (45 deg abd) 40 deg. 4/5/21  3. Patient will increase FOTO score to  64 to indicate increased functional mobility. PN: FOTO = 58.  4/5/2021. Progressing. 4. Patient will be able to reach to the bottom shelf of a kitchen cabinet in order to perform normal ADLs.   PN:  Notes he can reach the bottom shelf of a kitchen cabinet but defers still to the left.  4/2/2021.  Goal Met.       PLAN  [] Upgrade activities as tolerated     [x]  Continue plan of care  []  Update interventions per flow sheet       []  Discharge due to:_  []  Other:_      Shay Degroot PTA 4/7/2021  12:34 PM    Future Appointments   Date Time Provider Gonzalo Huerta   4/9/2021  1:15 PM Neli Tang PTA [facility]   4/26/2022  1:00 PM Bonita Rubin MD [facility]

## 2021-04-09 ENCOUNTER — HOSPITAL ENCOUNTER (OUTPATIENT)
Dept: PHYSICAL THERAPY | Age: 76
Discharge: HOME | End: 2021-04-09
Payer: MEDICARE

## 2021-04-09 PROCEDURE — 97112 NEUROMUSCULAR REEDUCATION: CPT

## 2021-04-09 PROCEDURE — 97110 THERAPEUTIC EXERCISES: CPT

## 2021-04-09 NOTE — PROGRESS NOTES
PT DAILY TREATMENT NOTE     Patient Name: Jose Garcia. Date:2021  : 1945  [x]  Patient  Verified  Payor: Shaggy Dangelo / Plan: Rozann Bowels / Product Type: Managed Care Medicare /    In time:1:15  Out time:2:05  Total Treatment Time (min): 50  Visit #: 2 of 8 (new recert)    Medicare/BCBS Only   Total Timed Codes (min):  40 1:1 Treatment Time:  40       Treatment Area: Pain in right shoulder [M25.511]    SUBJECTIVE  Pain Level (0-10 scale): 0  Any medication changes, allergies to medications, adverse drug reactions, diagnosis change, or new procedure performed?: [x] No    [] Yes (see summary sheet for update)  Subjective functional status/changes:   [] No changes reported  Patient reports that he was sore after last therapy session, but did not have pain. Patient reports he has been doing the updated home exercise with no problems. OBJECTIVE               Modality rationale: decrease inflammation and decrease pain to improve the patients ability to improve ease with sleep   Min Type Additional Details     10 [x]?????????  Ice     []?????????  heat  []?????????  Ice massage Position: Seated  Location: Right Shoulder, Post-tx              23 min Therapeutic Exercise:  []??? See flow sheet :Emphasis placed on improving available shoulder PROM/AAROM and promoting/maintaining right elbow/wrist/hand AROM and strength   Rationale: increase ROM and increase strength to improve the patients ability to increase ease with functional ADLs     17 min Neuromuscular Re-education:  [x]? ?  See flow sheet :emphasis on scapular stability.    Rationale: increase ROM and increase strength  to improve the patients ability to increase ease with overhead reaching.                With   [] TE   [] TA   [] neuro   [] other: Patient Education: [x] Review HEP    [] Progressed/Changed HEP based on:   [] positioning   [] body mechanics   [] transfers   [] heat/ice application    [] other: Other Objective/Functional Measures: patient unable to reach a shelf overhead. Pain Level (0-10 scale) post treatment: 0    ASSESSMENT/Changes in Function: No initiation of new exercises due to patient being sore after last therapy session. Patient will continue to benefit from skilled PT services to modify and progress therapeutic interventions, address functional mobility deficits, address ROM deficits, address strength deficits and analyze and address soft tissue restrictions to attain remaining goals. []  See Plan of Care  []  See progress note/recertification  []  See Discharge Summary         Progress towards goals / Updated goals:  Short Term Goals: To be accomplished in 1 weeks:  1.  Patient will become proficient in their HEP and will be compliant in performing that program.  PN:  MET - patient reports daily compliance as prescribed 2/26/2021  2.  Patient will demonstrate PROM right shoulder 0-110 deg; abd 0-90 deg in order to increase functional mobility. PN:  Goal met:  Right Shoulder Flexion PROM 135 degrees, Right Shoulder Abduction PROM 90 deg, 3/8/2021     Long Term Goals: To be accomplished in 4 weeks:  1. Patient's pain level will be 0/10-3/10 with activity in order to improve patient's ability to perform normal ADLs. PN:  Goal met: pain range 0/10-3/10.  4/5/2021.                          2. Patient will demonstrate AROM right shoulder flex 0-100, scaption 0-85, IR 0-10, ER 0-20 to increase ease of ADLs. PN: AROM right shoulder flexion 0-75, abduction 0-70, IR (45 deg abd) 45 deg, ER (45 deg abd) 40 deg. 4/5/21  3. Patient will increase FOTO score to  64 to indicate increased functional mobility. PN: FOTO = 58.  4/5/2021.  Progressing. 4. Patient will be able to reach to the bottom shelf of a kitchen cabinet in order to perform normal ADLs.   PN:  Notes he can reach the bottom shelf of a kitchen cabinet but defers still to the left.  4/2/2021.  Goal Met.       PLAN  []  Upgrade activities as tolerated     [x]  Continue plan of care  []  Update interventions per flow sheet       []  Discharge due to:_  []  Other:_      Kaz Angela, VIOLETTE 4/9/2021  1:18 PM    Future Appointments   Date Time Provider Gonzalo Huerta   4/12/2021  2:45 PM Samantha Lies, PTA [facility]   4/16/2021  2:00 PM Samantha Lies, [place] [facility]   4/19/2021 12:30 PM Jeremiah Rogers DPT [facility]   4/21/2021  2:45 PM Samantha Lies, PTA [facility]   4/26/2021 12:30 PM Samantha Lies, PTA [facility]   4/28/2021  2:45 PM Franchesca Dobson [facility]   5/3/2021 12:30 PM Samantha Lies, PTA [facility]   5/5/2021 12:30 PM Samantha Lies, PTA [facility]   4/26/2022  1:00 PM Gege Schulte MD [facility]

## 2021-04-12 ENCOUNTER — HOSPITAL ENCOUNTER (OUTPATIENT)
Dept: PHYSICAL THERAPY | Age: 76
Discharge: HOME | End: 2021-04-12
Payer: MEDICARE

## 2021-04-12 PROCEDURE — 97112 NEUROMUSCULAR REEDUCATION: CPT

## 2021-04-12 PROCEDURE — 97110 THERAPEUTIC EXERCISES: CPT

## 2021-04-12 NOTE — PROGRESS NOTES
PT DAILY TREATMENT NOTE     Patient Name: Jose Garcia. Date:2021  : 1945  [x]  Patient  Verified  Payor: Shaggy Dangelo / Plan: Envie de Fraises / Product Type: Managed Care Medicare /    In time:2:45  Out time:3:36  Total Treatment Time (min): 51  Visit #: 3 of 8    Medicare/BCBS Only   Total Timed Codes (min):  41 1:1 Treatment Time:  41       Treatment Area: Pain in right shoulder [M25.511]    SUBJECTIVE  Pain Level (0-10 scale): 0  Any medication changes, allergies to medications, adverse drug reactions, diagnosis change, or new procedure performed?: [x] No    [] Yes (see summary sheet for update)  Subjective functional status/changes:   [] No changes reported  Patient states he notices he is able to move his hand further along the wall while performing his wall slides exercises at home. OBJECTIVE              Modality rationale: decrease inflammation and decrease pain to improve the patients ability to improve ease with sleep   Min Type Additional Details     10 [x]??????????  Ice     []??????????  heat  []??????????  Ice massage Position: Seated  Location: Right Shoulder, Post-tx              23 min Therapeutic Exercise:  []???? See flow sheet :Emphasis placed on improving available shoulder PROM/AAROM and promoting/maintaining right elbow/wrist/hand AROM and strength   Rationale: increase ROM and increase strength to improve the patients ability to increase ease with functional ADLs     18 min Neuromuscular Re-education:  [x]? ??  See flow sheet :emphasis on scapular stability.    Rationale: increase ROM and increase strength  to improve the patients ability to increase ease with overhead reaching.                                                                                  With   [] TE   [] TA   [] neuro   [] other: Patient Education: [x] Review HEP    [] Progressed/Changed HEP based on:   [] positioning   [] body mechanics   [] transfers   [] heat/ice application    [] other:      Other Objective/Functional Measures: AROM flexion 0-80 deg, abduction 0-80 deg. Pain Level (0-10 scale) post treatment: 0    ASSESSMENT/Changes in Function: Patient is able to tolerate increase in weight and reps without increase in pain. Continue to focus on strengthening exercises. Patient will continue to benefit from skilled PT services to modify and progress therapeutic interventions, address functional mobility deficits, address ROM deficits, address strength deficits and analyze and address soft tissue restrictions to attain remaining goals. []  See Plan of Care  []  See progress note/recertification  []  See Discharge Summary         Progress towards goals / Updated goals:  Short Term Goals: To be accomplished in 1 weeks:  1.  Patient will become proficient in their HEP and will be compliant in performing that program.  PN:  MET - patient reports daily compliance as prescribed 2/26/2021  2.  Patient will demonstrate PROM right shoulder 0-110 deg; abd 0-90 deg in order to increase functional mobility. PN:  Goal met:  Right Shoulder Flexion PROM 135 degrees, Right Shoulder Abduction PROM 90 deg, 3/8/2021     Long Term Goals: To be accomplished in 4 weeks:  1. Patient's pain level will be 0/10-3/10 with activity in order to improve patient's ability to perform normal ADLs. PN:  Goal met: pain range 0/10-3/10.  4/5/2021.                          2. Patient will demonstrate AROM right shoulder flex 0-100, scaption 0-85, IR 0-10, ER 0-20 to increase ease of ADLs. PN: AROM right shoulder flexion 0-75, abduction 0-70, IR (45 deg abd) 45 deg, ER (45 deg abd) 40 deg. 4/5/21  Current: Progressing: AROM flexion 0-80 deg, abduction 0-80 deg. 4/12/21  3. Patient will increase FOTO score to  64 to indicate increased functional mobility. PN: FOTO = 58.  4/5/2021.  Progressing.   4. Patient will be able to reach to the bottom shelf of a kitchen cabinet in order to perform normal ADLs.  PN:  Notes he can reach the bottom shelf of a kitchen cabinet but defers still to the left.  4/2/2021.  Goal Met.       PLAN  []  Upgrade activities as tolerated     [x]  Continue plan of care  []  Update interventions per flow sheet       []  Discharge due to:_  []  Other:_      Kaz Angela PTA 4/12/2021  2:50 PM    Future Appointments   Date Time Provider Gonzalo Huerta   4/16/2021  2:00 PM Monico Isaacs [facility]   4/19/2021 12:30 PM Jeremiah Rogers DPT [facility]   4/21/2021  2:45 PM Samantha Lies, PTA [facility]   4/26/2021 12:30 PM Samantha Lies, PTA [facility]   4/28/2021  2:45 PM Samantha Lies, PTA [facility]   5/3/2021 12:30 PM Samantha Lies, PTA [facility]   5/5/2021 12:30 PM Samantha Lies, PTA [facility]   4/26/2022  1:00 PM MD ANTONIA Sibley BS AMB

## 2021-04-16 ENCOUNTER — HOSPITAL ENCOUNTER (OUTPATIENT)
Dept: PHYSICAL THERAPY | Age: 76
Discharge: HOME | End: 2021-04-16
Payer: MEDICARE

## 2021-04-16 PROCEDURE — 97110 THERAPEUTIC EXERCISES: CPT

## 2021-04-16 PROCEDURE — 97112 NEUROMUSCULAR REEDUCATION: CPT

## 2021-04-16 NOTE — PROGRESS NOTES
PT DAILY TREATMENT NOTE     Patient Name: Paradise Kramer. Date:2021  : 1945  [x]  Patient  Verified  Payor: Jc Winters / Plan: Caitie Snell / Product Type: Managed Care Medicare /    In time:1:55  Out time:2:33  Total Treatment Time (min): 38  Visit #: 4 of 8    Medicare/BCBS Only   Total Timed Codes (min):  38 1:1 Treatment Time:  38       Treatment Area: Pain in right shoulder [M25.511]    SUBJECTIVE  Pain Level (0-10 scale): 0  Any medication changes, allergies to medications, adverse drug reactions, diagnosis change, or new procedure performed?: [x] No    [] Yes (see summary sheet for update)  Subjective functional status/changes:   [] No changes reported  Patient states he has noticed he is able to reach higher for objects. OBJECTIVE        23 min Therapeutic Exercise:  []????? See flow sheet :Emphasis placed on improving available shoulder PROM/AAROM and promoting/maintaining right elbow/wrist/hand AROM and strength   Rationale: increase ROM and increase strength to improve the patients ability to increase ease with functional ADLs     15 min Neuromuscular Re-education:  [x]? ???  See flow sheet :emphasis on scapular stability.    Rationale: increase ROM and increase strength  to improve the patients ability to increase ease with overhead reaching.                   With   [] TE   [] TA   [] neuro   [] other: Patient Education: [x] Review HEP    [] Progressed/Changed HEP based on:   [] positioning   [] body mechanics   [] transfers   [] heat/ice application    [] other:      Other Objective/Functional Measures: shelf reach at time of stair rail. Pain Level (0-10 scale) post treatment: 0    ASSESSMENT/Changes in Function: Patient is able to reach higher along the stair well with the shelf reaches using the cone. Patient is continuing to progress well with tolerating increase in resistances.      Patient will continue to benefit from skilled PT services to modify and progress therapeutic interventions, address functional mobility deficits, address ROM deficits, address strength deficits and analyze and address soft tissue restrictions to attain remaining goals. []  See Plan of Care  []  See progress note/recertification  []  See Discharge Summary         Progress towards goals / Updated goals:  Short Term Goals: To be accomplished in 1 weeks:  1.  Patient will become proficient in their HEP and will be compliant in performing that program.  PN:  MET - patient reports daily compliance as prescribed 2/26/2021  2.  Patient will demonstrate PROM right shoulder 0-110 deg; abd 0-90 deg in order to increase functional mobility. PN:  Goal met:  Right Shoulder Flexion PROM 135 degrees, Right Shoulder Abduction PROM 90 deg, 3/8/2021     Long Term Goals: To be accomplished in 4 weeks:  1. Patient's pain level will be 0/10-3/10 with activity in order to improve patient's ability to perform normal ADLs. PN:  Goal met: pain range 0/10-3/10.  4/5/2021.                          2. Patient will demonstrate AROM right shoulder flex 0-100, scaption 0-85, IR 0-10, ER 0-20 to increase ease of ADLs. PN: AROM right shoulder flexion 0-75, abduction 0-70, IR (45 deg abd) 45 deg, ER (45 deg abd) 40 deg. 4/5/21  Current: Progressing: AROM flexion 0-80 deg, abduction 0-80 deg. 4/12/21  3. Patient will increase FOTO score to  64 to indicate increased functional mobility. PN: FOTO = 58.  4/5/2021.  Progressing. 4. Patient will be able to reach to the bottom shelf of a kitchen cabinet in order to perform normal ADLs.   PN:  Notes he can reach the bottom shelf of a kitchen cabinet but defers still to the left.  4/2/2021.  Goal Met.       PLAN  []  Upgrade activities as tolerated     [x]  Continue plan of care  []  Update interventions per flow sheet       []  Discharge due to:_  []  Other:_      Hansel Dorantes, PTA 4/16/2021  1:57 PM    Future Appointments   Date Time Provider Department [place]   4/16/2021  2:00 PM Luis Yarbrough [facility]   4/19/2021 12:30 PM Elke Stein DPT [facility]   4/21/2021  2:45 PM Luis Yarbrough [facility]   4/26/2021 12:30 PM Maira Prince PTA [facility]   4/28/2021  2:45 PM Radha Eduardo [facility]   5/3/2021 12:30 PM Radha Eduardo [facility]   5/5/2021 12:30 PM Maira Prince PTA [facility]   4/26/2022  1:00 PM Ildefonso Shen MD [facility]

## 2021-04-19 ENCOUNTER — HOSPITAL ENCOUNTER (OUTPATIENT)
Dept: PHYSICAL THERAPY | Age: 76
Discharge: HOME | End: 2021-04-19
Payer: MEDICARE

## 2021-04-19 PROCEDURE — 97112 NEUROMUSCULAR REEDUCATION: CPT

## 2021-04-19 PROCEDURE — 97110 THERAPEUTIC EXERCISES: CPT

## 2021-04-19 NOTE — PROGRESS NOTES
PT DAILY TREATMENT NOTE     Patient Name: Antoinette Bateman. Date:2021  : 1945  [x]  Patient  Verified  Payor: Krishna Villareal / Plan: Sarai Bazan / Product Type: Managed Care Medicare /    In time:12:30P  Out time: 1:08P  Total Treatment Time (min): 38  Visit #: 5 of 8    Medicare/BCBS Only   Total Timed Codes (min):  38 1:1 Treatment Time:  38       Treatment Area: Pain in right shoulder [M25.511]    SUBJECTIVE  Pain Level (0-10 scale): 0  Any medication changes, allergies to medications, adverse drug reactions, diagnosis change, or new procedure performed?: [x] No    [] Yes (see summary sheet for update)  Subjective functional status/changes:   [] No changes reported  Patient reports continued difficulty reaching for objects. Reports full compliance with prescribed HEP. OBJECTIVE     24 min Therapeutic Exercise:  [x]? ???? See flow sheet :Emphasis placed on improving available shoulder PROM/AAROM and promoting/maintaining right elbow/wrist/hand AROM and strength   Rationale: increase ROM and increase strength to improve the patients ability to increase ease with functional ADLs     14 min Neuromuscular Re-education:  [x]? ???  See flow sheet :emphasis on scapular stability.    Rationale: increase ROM and increase strength  to improve the patients ability to increase ease with overhead reaching.            With   [x] TE   [] TA   [x] neuro   [] other: Patient Education: [x] Review HEP    [] Progressed/Changed HEP based on:   [] positioning   [] body mechanics   [] transfers   [] heat/ice application    [] other:      Other Objective/Functional Measures: See Goals Below      Pain Level (0-10 scale) post treatment: 0    ASSESSMENT/Changes in Function: Patient slowly progressing in regards to AROM. Requires occational cuing to correct form throughout session w/ demonstration of ability to correct 100% of therapuetic opportunities.      Patient will continue to benefit from skilled PT services to modify and progress therapeutic interventions, address functional mobility deficits, address ROM deficits, address strength deficits and analyze and address soft tissue restrictions to attain remaining goals. [x]  See Plan of Care  []  See progress note/recertification  []  See Discharge Summary         Progress towards goals / Updated goals:  Short Term Goals: To be accomplished in 1 weeks:  1.  Patient will become proficient in their HEP and will be compliant in performing that program.  PN:  MET - patient reports daily compliance as prescribed 2/26/2021  2.  Patient will demonstrate PROM right shoulder 0-110 deg; abd 0-90 deg in order to increase functional mobility. PN:  Goal met:  Right Shoulder Flexion PROM 135 degrees, Right Shoulder Abduction PROM 90 deg, 3/8/2021     Long Term Goals: To be accomplished in 4 weeks:  1. Patient's pain level will be 0/10-3/10 with activity in order to improve patient's ability to perform normal ADLs. PN:  Goal met: pain range 0/10-3/10.  4/5/2021.                          2. Patient will demonstrate AROM right shoulder flex 0-100, scaption 0-85, IR 0-10, ER 0-20 to increase ease of ADLs. PN: AROM right shoulder flexion 0-75, abduction 0-70, IR (45 deg abd) 45 deg, ER (45 deg abd) 40 deg. 4/5/21  Current: Progressing: AROM flexion 0-86 deg, scaption 0-81 deg. 4/19/21    3. Patient will increase FOTO score to  64 to indicate increased functional mobility. PN: FOTO = 58.  4/5/2021.  Progressing. 4. Patient will be able to reach to the bottom shelf of a kitchen cabinet in order to perform normal ADLs.   PN:  Notes he can reach the bottom shelf of a kitchen cabinet but defers still to the left.  4/2/2021.  Goal Met.       PLAN  []  Upgrade activities as tolerated     [x]  Continue plan of care  []  Update interventions per flow sheet       []  Discharge due to:_  []  Other:_      Cezar Lombardo DPT 4/19/2021  1:57 PM    Future Appointments   Date Time Provider Gonz[place] Deanna   4/19/2021 12:30 PM Mary Morales DPT [facility]   4/21/2021  2:45 PM Monico Stover [facility]   4/26/2021 12:30 PM Jose Diggs PTA [facility]   4/28/2021  2:45 PM Monico Stover [facility]   5/3/2021 12:30 PM Adria Al [facility]   5/5/2021 12:30 PM Jose Diggs PTA [facility]   4/26/2022  1:00 PM Adriana Diez MD [facility]

## 2021-04-21 ENCOUNTER — HOSPITAL ENCOUNTER (OUTPATIENT)
Dept: PHYSICAL THERAPY | Age: 76
Discharge: HOME | End: 2021-04-21
Payer: MEDICARE

## 2021-04-21 PROCEDURE — 97112 NEUROMUSCULAR REEDUCATION: CPT

## 2021-04-21 PROCEDURE — 97110 THERAPEUTIC EXERCISES: CPT

## 2021-04-21 NOTE — PROGRESS NOTES
PT DAILY TREATMENT NOTE     Patient Name: Antoinette Bateman. Date:2021  : 1945  [x]  Patient  Verified  Payor: Krishna Villareal / Plan: Sarai Bazan / Product Type: Managed Care Medicare /    In time:2:45  Out time:3:26  Total Treatment Time (min): 41  Visit #: 6 of 8    Medicare/BCBS Only   Total Timed Codes (min):  41 1:1 Treatment Time:  41       Treatment Area: Pain in right shoulder [M25.511]    SUBJECTIVE  Pain Level (0-10 scale): 0  Any medication changes, allergies to medications, adverse drug reactions, diagnosis change, or new procedure performed?: [x] No    [] Yes (see summary sheet for update)  Subjective functional status/changes:   [] No changes reported  Patient reports no changes with his shoulder since last therapy session. OBJECTIVE        26 min Therapeutic Exercise:  []?????? See flow sheet :Emphasis placed on improving available shoulder PROM/AAROM and promoting/maintaining right elbow/wrist/hand AROM and strength   Rationale: increase ROM and increase strength to improve the patients ability to increase ease with functional ADLs     15 min Neuromuscular Re-education:  [x]? ????  See flow sheet :emphasis on scapular stability.    Rationale: increase ROM and increase strength  to improve the patients ability to increase ease with overhead reaching.               With   [] TE   [] TA   [] neuro   [] other: Patient Education: [x] Review HEP    [] Progressed/Changed HEP based on:   [] positioning   [] body mechanics   [] transfers   [] heat/ice application    [] other:      Other Objective/Functional Measures: patient stating improvement with reaching overhead in cabinets. Pain Level (0-10 scale) post treatment: 0    ASSESSMENT/Changes in Function: Patient was able to tolerate increase in weight with exercises without increase in pain.       Patient will continue to benefit from skilled PT services to modify and progress therapeutic interventions, address functional mobility deficits, address ROM deficits, address strength deficits and analyze and address soft tissue restrictions to attain remaining goals. []  See Plan of Care  []  See progress note/recertification  []  See Discharge Summary         Progress towards goals / Updated goals:  Short Term Goals: To be accomplished in 1 weeks:  1.  Patient will become proficient in their HEP and will be compliant in performing that program.  PN:  MET - patient reports daily compliance as prescribed 2/26/2021  2.  Patient will demonstrate PROM right shoulder 0-110 deg; abd 0-90 deg in order to increase functional mobility. PN:  Goal met:  Right Shoulder Flexion PROM 135 degrees, Right Shoulder Abduction PROM 90 deg, 3/8/2021     Long Term Goals: To be accomplished in 4 weeks:  1. Patient's pain level will be 0/10-3/10 with activity in order to improve patient's ability to perform normal ADLs. PN:  Goal met: pain range 0/10-3/10.  4/5/2021.                          2. Patient will demonstrate AROM right shoulder flex 0-100, scaption 0-85, IR 0-10, ER 0-20 to increase ease of ADLs. PN: AROM right shoulder flexion 0-75, abduction 0-70, IR (45 deg abd) 45 deg, ER (45 deg abd) 40 deg. 4/5/21  Current: Progressing: AROM flexion 0-86 deg, scaption 0-81 deg. 4/19/21  3. Patient will increase FOTO score to  64 to indicate increased functional mobility. PN: FOTO = 58.  4/5/2021.  Progressing. 4. Patient will be able to reach to the bottom shelf of a kitchen cabinet in order to perform normal ADLs.   PN:  Notes he can reach the bottom shelf of a kitchen cabinet but defers still to the left.  4/2/2021.  Goal Met.       PLAN  []  Upgrade activities as tolerated     [x]  Continue plan of care  []  Update interventions per flow sheet       []  Discharge due to:_  []  Other:_      Kendal Gomez PTA 4/21/2021  2:49 PM    Future Appointments   Date Time Provider Gonzalo Huerta   4/26/2021 12:30 PM Jose Diggs PTA [facility]   4/28/2021  2:45 PM Monico Cartagena [facility]   5/3/2021 12:30 PM Sid Saini [facility]   5/5/2021 12:30 PM Keisha Cherry PTA [facility]   4/26/2022  1:00 PM Kajal Agosto MD [facility]

## 2021-04-26 ENCOUNTER — HOSPITAL ENCOUNTER (OUTPATIENT)
Dept: PHYSICAL THERAPY | Age: 76
Discharge: HOME | End: 2021-04-26
Payer: MEDICARE

## 2021-04-26 PROCEDURE — 97110 THERAPEUTIC EXERCISES: CPT

## 2021-04-26 PROCEDURE — 97112 NEUROMUSCULAR REEDUCATION: CPT

## 2021-04-26 NOTE — PROGRESS NOTES
PT DAILY TREATMENT NOTE     Patient Name: [id number] [facility]. Date:2021  : 1945  [x]  Patient  Verified  Payor: Nel Pemberton / Plan: [address] [employer] [address] / Product Type: Managed Care Medicare /    In time:12:30  Out time:1:09  Total Treatment Time (min): 39  Visit #: 7 of 8    Medicare/BCBS Only   Total Timed Codes (min):  39 1:1 Treatment Time:  39       Treatment Area: Pain in right shoulder [M25.511]    SUBJECTIVE  Pain Level (0-10 scale):0  Any medication changes, allergies to medications, adverse drug reactions, diagnosis change, or new procedure performed?: [x] No    [] Yes (see summary sheet for update)  Subjective functional status/changes:   [] No changes reported  Patient states he was able to perform yard work this weekend of [employer]. OBJECTIVE       23 min Therapeutic Exercise:  []??????? See flow sheet :Emphasis placed on improving available shoulder PROM/AAROM and promoting/maintaining right elbow/wrist/hand AROM and strength   Rationale: increase ROM and increase strength to improve the patients ability to increase ease with functional ADLs     16 min Neuromuscular Re-education:  [x]??????  See flow sheet :emphasis on scapular stability.    Rationale: increase ROM and increase strength  to improve the patients ability to increase ease with overhead reaching.                With   [] TE   [] TA   [] neuro   [] other: Patient Education: [x] Review HEP    [] Progressed/Changed HEP based on:   [] positioning   [] body mechanics   [] transfers   [] heat/ice application    [] other:      Other Objective/Functional Measures: AROM flexion 0-96 deg, scaption 0-86 deg     Pain Level (0-10 scale) post treatment: 2-3    ASSESSMENT/Changes in Function: Patient is progressing well with AROM of right shoulder. Initiated wall slides with lift off to focus on end range strengthening in flexion.      Patient will continue to benefit from skilled PT services to modify and progress therapeutic interventions, address functional mobility deficits, address ROM deficits, address strength deficits and analyze and address soft tissue restrictions to attain remaining goals. []  See Plan of Care  []  See progress note/recertification  []  See Discharge Summary         Progress towards goals / Updated goals:  Short Term Goals: To be accomplished in 1 weeks:  1.  Patient will become proficient in their HEP and will be compliant in performing that program.  PN:  MET - patient reports daily compliance as prescribed 2/26/2021  2.  Patient will demonstrate PROM right shoulder 0-110 deg; abd 0-90 deg in order to increase functional mobility. PN:  Goal met:  Right Shoulder Flexion PROM 135 degrees, Right Shoulder Abduction PROM 90 deg, 3/8/2021     Long Term Goals: To be accomplished in 4 weeks:  1. Patient's pain level will be 0/10-3/10 with activity in order to improve patient's ability to perform normal ADLs. PN:  Goal met: pain range 0/10-3/10.  4/5/2021.                          2. Patient will demonstrate AROM right shoulder flex 0-100, scaption 0-85, IR 0-10, ER 0-20 to increase ease of ADLs. PN: AROM right shoulder flexion 0-75, abduction 0-70, IR (45 deg abd) 45 deg, ER (45 deg abd) 40 deg. 4/5/21  Current: Progressing: AROM flexion 0-96 deg, scaption 0-86 deg. 4/26/21  3. Patient will increase FOTO score to  64 to indicate increased functional mobility. PN: FOTO = 58.  4/5/2021.  Progressing. 4. Patient will be able to reach to the bottom shelf of a kitchen cabinet in order to perform normal ADLs. PN:  Notes he can reach the bottom shelf of a kitchen cabinet but defers still to the left.  4/2/2021.  Goal Met.     PLAN  []  Upgrade activities as tolerated     [x]  Continue plan of care  []  Update interventions per flow sheet       []  Discharge due to:_  []  Other:_      Lory Asters, PTA 4/26/2021  12:25 PM    Future Appointments   Date Time Provider Gonzalo Huerta 4/26/2021 12:30 PM Shannon Jo PTA [facility]   4/28/2021  2:45 PM Shannon Jo PTA [facility]   4/26/2022  1:00 PM MD ANTON[facility]

## 2021-04-28 ENCOUNTER — HOSPITAL ENCOUNTER (OUTPATIENT)
Dept: PHYSICAL THERAPY | Age: 76
Discharge: HOME | End: 2021-04-28
Payer: MEDICARE

## 2021-04-28 PROCEDURE — 97112 NEUROMUSCULAR REEDUCATION: CPT

## 2021-04-28 PROCEDURE — 97110 THERAPEUTIC EXERCISES: CPT

## 2021-04-28 NOTE — PROGRESS NOTES
PT DAILY TREATMENT NOTE     Patient Name: Paulette Iniguez. Date:2021  : 1945  [x]  Patient  Verified  Payor: Te Bill / Plan: Bart Smart / Product Type: Managed Care Medicare /    In time:2:41  Out time:3:20  Total Treatment Time (min): 39  Visit #: 8 of 8    Medicare/BCBS Only   Total Timed Codes (min):  39 1:1 Treatment Time:  39       Treatment Area: Pain in right shoulder [M25.511]    SUBJECTIVE  Pain Level (0-10 scale):0   Any medication changes, allergies to medications, adverse drug reactions, diagnosis change, or new procedure performed?: [x] No    [] Yes (see summary sheet for update)  Subjective functional status/changes:   [] No changes reported  Patient reports he continues to notice improvement with her shoulder and being able to reach further for objects. OBJECTIVE       23 min Therapeutic Exercise:  []???????? See flow sheet :Emphasis placed on improving available shoulder PROM/AAROM and promoting/maintaining right elbow/wrist/hand AROM and strength   Rationale: increase ROM and increase strength to improve the patients ability to increase ease with functional ADLs     16 min Neuromuscular Re-education:  [x]???????  See flow sheet :emphasis on scapular stability.    Rationale: increase ROM and increase strength  to improve the patients ability to increase ease with overhead reaching.            With   [] TE   [] TA   [] neuro   [] other: Patient Education: [x] Review HEP    [] Progressed/Changed HEP based on:   [] positioning   [] body mechanics   [] transfers   [] heat/ice application    [] other:      Other Objective/Functional Measures: see goals     Pain Level (0-10 scale) post treatment: 0    ASSESSMENT/Changes in Function: Patient has progressed well per post surgical protocol. Patient reports being able to reach in overhead cabinet with little to no difficulty. Patient has return to fishing and recreational hobbies with minimal difficulty. []  See Plan of Care  []  See progress note/recertification  [x]  See Discharge Summary         Progress towards goals / Updated goals:  Short Term Goals: To be accomplished in 1 weeks:  1.  Patient will become proficient in their HEP and will be compliant in performing that program.  PN:  MET - patient reports daily compliance as prescribed 2/26/2021  2.  Patient will demonstrate PROM right shoulder 0-110 deg; abd 0-90 deg in order to increase functional mobility. PN:  Goal met:  Right Shoulder Flexion PROM 135 degrees, Right Shoulder Abduction PROM 90 deg, 3/8/2021     Long Term Goals: To be accomplished in 4 weeks:  1. Patient's pain level will be 0/10-3/10 with activity in order to improve patient's ability to perform normal ADLs. PN:  Goal met: pain range 0/10-3/10.  4/5/2021.                          2. Patient will demonstrate AROM right shoulder flex 0-100, scaption 0-85, IR 0-10, ER 0-20 to increase ease of ADLs. PN: AROM right shoulder flexion 0-75, abduction 0-70, IR (45 deg abd) 45 deg, ER (45 deg abd) 40 deg. 4/5/21  Current: Progressing: AROM flexion 0-96 deg, scaption 0-86 deg. 4/26/21  3. Patient will increase FOTO score to  64 to indicate increased functional mobility. PN: FOTO = 58.  4/5/2021.  Progressing. Current: MET; FOTO = 68. 4/28/21  4. Patient will be able to reach to the bottom shelf of a kitchen cabinet in order to perform normal ADLs. PN:  Notes he can reach the bottom shelf of a kitchen cabinet but defers still to the left.  4/2/2021.  Goal Met. PLAN  []  Upgrade activities as tolerated     []  Continue plan of care  []  Update interventions per flow sheet       [x]  Discharge due to:_patient met most LTGs.    []  Other:_      Monda Basket, PTA 4/28/2021  2:47 PM    Future Appointments   Date Time Provider Gonzalo Huerta   4/26/2022  1:00 PM Brodie Davis MD [facility]

## 2021-04-28 NOTE — PROGRESS NOTES
Physical Therapy Discharge Instructions      [facility]   [address]   [place], [address] [place]   [phone] fax    Patient: Aruna Washington. : 1945      Continue Home Exercise Program 1 times per day for 4 weeks, then decrease to 3 times per week      Continue with    [x] Ice  as needed    [x] Heat           Follow up with MD:     [] Upon completion of therapy     [x] As needed      Recommendations:     [x]   Return to activity with home program    []   Return to activity with the following modifications:       []Post Rehab Program    []Join Independent aquatic program     []Return to/join local gym        Additional Comments: Continue to perform exercises to maintain mobility.        Darlene Choi, VIOLETTE 2021 3:10 PM

## 2021-04-29 NOTE — PROGRESS NOTES
[facility]              [address]        [place], [address] [place]   [phone] fax    Discharge Summary  Patient name: Franchesca Melissa Start of Care: 2021   Referral source: Onel Cormier MD : 1945   Medical/Treatment Diagnosis: Pain in right shoulder [M25.511]  Payor: Chantelle Valdez / Plan: Logan Lozoya / Product Type: Managed Care Medicare /  Onset Date:2021 DOS     Prior Hospitalization: see medical history Provider#: [id number]   Medications: Verified on Patient Summary List    Comorbidities: Arthritis, BMI 30.7, Diabetes, prior surgery, Sleep dysfunction. Prior Level of Function: Independent,no limitations.  Independent self care.               Visits from Start of Care: 27    Missed Visits: 1  Reporting Period : 2021 to 2021    Summary of Care:  Short Term Goals: To be accomplished in 1 weeks:  1.  Patient will become proficient in their HEP and will be compliant in performing that program.  PN:  MET - patient reports daily compliance as prescribed 2021  2.  Patient will demonstrate PROM right shoulder 0-110 deg; abd 0-90 deg in order to increase functional mobility. PN:  Goal met:  Right Shoulder Flexion PROM 135 degrees, Right Shoulder Abduction PROM 90 deg, 3/8/2021     Long Term Goals: To be accomplished in 4 weeks:  1. Patient's pain level will be 0/10-3/10 with activity in order to improve patient's ability to perform normal ADLs. PN:  Goal met: pain range 0/10-3/10.  2021.                          2. Patient will demonstrate AROM right shoulder flex 0-100, scaption 0-85, IR 0-10, ER 0-20 to increase ease of ADLs. PN: AROM right shoulder flexion 0-75, abduction 0-70, IR (45 deg abd) 45 deg, ER (45 deg abd) 40 deg. 21  Current: Progressing: AROM flexion 0-96 deg, scaption 0-86 deg. 21  3. Patient will increase FOTO score to  64 to indicate increased functional mobility.   PN: FOTO = 58.  4/5/2021.  Progressing. Current: MET; FOTO = 68. 4/28/21  4. Patient will be able to reach to the bottom shelf of a kitchen cabinet in order to perform normal ADLs. PN:  Notes he can reach the bottom shelf of a kitchen cabinet but defers still to the left.  4/2/2021.  Goal Met.     ASSESSMENT/RECOMMENDATIONS:  [x]Discontinue therapy: [x]Patient has reached or is progressing toward set goals      []Patient is non-compliant or has abdicated      []Due to lack of appreciable progress towards set goals    Luz Rachel, PT 4/29/2021 7:09 AM

## 2021-05-03 ENCOUNTER — APPOINTMENT (OUTPATIENT)
Dept: PHYSICAL THERAPY | Age: 76
End: 2021-05-03

## 2021-05-05 ENCOUNTER — APPOINTMENT (OUTPATIENT)
Dept: PHYSICAL THERAPY | Age: 76
End: 2021-05-05

## 2021-06-16 ENCOUNTER — OFFICE VISIT (OUTPATIENT)
Dept: ORTHOPEDIC SURGERY | Age: 76
End: 2021-06-16
Payer: MEDICARE

## 2021-06-16 VITALS — TEMPERATURE: 97.2 F | BODY MASS INDEX: 32.58 KG/M2 | OXYGEN SATURATION: 97 % | HEART RATE: 70 BPM | WEIGHT: 233.6 LBS

## 2021-06-16 DIAGNOSIS — M70.22 OLECRANON BURSITIS OF LEFT ELBOW: Primary | ICD-10-CM

## 2021-06-16 PROCEDURE — 20605 DRAIN/INJ JOINT/BURSA W/O US: CPT | Performed by: ORTHOPAEDIC SURGERY

## 2021-06-16 PROCEDURE — 1101F PT FALLS ASSESS-DOCD LE1/YR: CPT | Performed by: ORTHOPAEDIC SURGERY

## 2021-06-16 PROCEDURE — 99203 OFFICE O/P NEW LOW 30 MIN: CPT | Performed by: ORTHOPAEDIC SURGERY

## 2021-06-16 PROCEDURE — G8510 SCR DEP NEG, NO PLAN REQD: HCPCS | Performed by: ORTHOPAEDIC SURGERY

## 2021-06-16 PROCEDURE — G8536 NO DOC ELDER MAL SCRN: HCPCS | Performed by: ORTHOPAEDIC SURGERY

## 2021-06-16 PROCEDURE — G8417 CALC BMI ABV UP PARAM F/U: HCPCS | Performed by: ORTHOPAEDIC SURGERY

## 2021-06-16 PROCEDURE — G8427 DOCREV CUR MEDS BY ELIG CLIN: HCPCS | Performed by: ORTHOPAEDIC SURGERY

## 2021-06-16 RX ORDER — TRIAMCINOLONE ACETONIDE 40 MG/ML
40 INJECTION, SUSPENSION INTRA-ARTICULAR; INTRAMUSCULAR ONCE
Status: COMPLETED | OUTPATIENT
Start: 2021-06-16 | End: 2021-06-16

## 2021-06-16 RX ADMIN — TRIAMCINOLONE ACETONIDE 40 MG: 40 INJECTION, SUSPENSION INTRA-ARTICULAR; INTRAMUSCULAR at 08:57

## 2021-06-16 NOTE — PROGRESS NOTES
Patient: Kayode Nolasco. MRN: [id number]       SSN: [id number]  YOB: 1945        AGE: 67 y.o. SEX: male  Body mass index is 32.58 kg/m². PCP: Dilip Torres MD  06/16/21    CHIEF COMPLAINT: Left elbow swelling 0/10 pain    HPI: Kayode Kraft is a 67 y.o. male patient who presents to the office today with 1 month of left elbow swelling. Minimal pain. He denies any specific injury or trauma to the elbow but he says he does push-up with his left elbow. He had his right shoulder replaced several months ago so he has been more dependent on his left elbow and is noted posterior swelling for the last 1 month. He denies any fevers or chills or otherwise infection. 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 and other lower urinary tract symptoms (LUTS)        Family History   Problem Relation Age of Onset    Diabetes Mother     Cancer Father     Cancer Other     Diabetes Other        Current Outpatient Medications   Medication Sig Dispense Refill    Tresiba FlexTouch U-100 100 unit/mL (3 mL) inpn inject 15 units subcutaneously daily      BD Maria 2nd Gen Pen Needle 32 gauge x 5/32\" ndle use 1 daily subcutaneously daily      Jardiance 25 mg tablet take 1 tablet by mouth once daily      pioglitazone (ACTOS) 45 mg tablet take 1 tablet by mouth once daily      linagliptin (TRADJENTA) 5 mg tablet Take 5 mg by mouth daily.  cyanocobalamin (B-12 DOTS) 500 mcg tablet Take 500 mcg by mouth daily.  ferrous sulfate (IRON) 325 mg (65 mg Iron) tablet Take  by mouth daily (before breakfast).  GLIPIZIDE (GLUCOTROL PO) Take  by mouth.  OMEPRAZOLE (PRILOSEC PO) Take  by mouth.       SIMVASTATIN (ZOCOR PO) Take  by mouth.  hydrochlorothiazide (HYDRODIURIL) 50 mg tablet Take 25 mg by mouth daily.  acetaminophen (TYLENOL ARTHRITIS PAIN PO) Take 1,000 mg by mouth every six (6) hours as needed for Pain. (Patient not taking: Reported on 6/16/2021)      vit A/vit C/vit E/zinc/copper (PRESERVISION AREDS PO) Take 1 Tab by mouth daily. (Patient not taking: Reported on 6/16/2021)      amiodarone (PACERONE) 100 mg tablet Take 50 mg by mouth daily. Indications: prevention of recurrent atrial fibrillation      oxyCODONE-acetaminophen (PERCOCET) 5-325 mg per tablet Take 1 Tab by mouth every four (4) hours as needed for Pain. Max Daily Amount: 6 Tabs.  (Patient not taking: Reported on 6/16/2021) 15 Tab 0       Allergies   Allergen Reactions    Penicillins Unknown (comments)     Other reaction(s): mild rash/itching       Past Surgical History:   Procedure Laterality Date    CYSTOSCOPY  2/12/2009    Left ureteroplvic junction - stone removed with basket    HX BACK SURGERY      HX GASTRIC BYPASS  2005    HX ORTHOPAEDIC Left     shoulder sx    HX OTHER SURGICAL      Left shoulder    HX OTHER SURGICAL      4-5 Lumbar disc    HX UROLOGICAL      [place] ANESTH,SURGERY OF SHOULDER      [place] LAP,PYELOPLASTY      Left       Social History     Socioeconomic History    Marital status:      Spouse name: Not on file    Number of children: Not on file    Years of education: Not on file    Highest education level: Not on file   Occupational History    Not on file   Tobacco Use    Smoking status: Former Smoker    Smokeless tobacco: Current User   Vaping Use    Vaping Use: Every day   Substance and Sexual Activity    Alcohol use: Not Currently     Alcohol/week: 0.8 standard drinks     Types: 1 Shots of liquor per week     Comment: seldom    Drug use: No    Sexual activity: Not Currently   Other Topics Concern    Not on file   Social History Narrative    Not on file     Social Determinants of Health     Financial Resource Strain:     Difficulty of Paying Living Expenses:    Food Insecurity:     Worried About Running Out of Food in the Last Year:     [address] in the Last Year:    Transportation Needs:     Lack of Transportation (Medical):  Lack of Transportation (Non-Medical):    Physical Activity:     Days of Exercise per Week:     Minutes of Exercise per Session:    Stress:     Feeling of Stress :    Social Connections:     Frequency of Communication with Friends and Family:     Frequency of Social Gatherings with Friends and Family:     Attends Baptism Services:     Active Member of Clubs or Organizations:     Attends Club or Organization Meetings:     Marital Status:    Intimate Partner Violence:     Fear of Current or Ex-Partner:     Emotionally Abused:     Physically Abused:     Sexually Abused:        REVIEW OF SYSTEMS:      CON: negative for recent weight loss/gain, fever, or chills  EYE: negative for double or blurry vision  ENT: negative for hoarseness  RS:   negative for cough, URI, SOB  CV:  negative for chest pain, palpitations  GI:    negative for blood in stool, nausea/vomiting  :  negative for blood in urine  MS: As per HPI  Other systems reviewed and noted below. PHYSICAL EXAMINATION:  Visit Vitals  Pulse 70   Temp 97.2 °F (36.2 °C) (Temporal)   Wt 233 lb 9.6 oz (106 kg)   SpO2 97%   BMI 32.58 kg/m²     Body mass index is 32.58 kg/m². GENERAL: Alert and oriented x3, in no acute distress, well-developed, well-nourished. HEENT: Normocephalic, atraumatic. RESP: Non labored breathing with equal chest rise on inspiration. CV: Well perfused extremities. No cyanosis or clubbing noted. ABDOMEN: Soft, non-tender, non-distended.    Elbow Exam   R   L  ROM Active      Flexion   Full   Full   Extension  Full   Full   Pronation  Full   Full   Supination  Full   Full  ROM Passive   Flexion   Full   Full   Extension  Full   Full   Pronation  Full   Full   Supination  Full   Full  Tenderness to palpation   Medial Epicondyle -   -   Lateral Epicondyle -   -  Tinel Sign Cubital Tunnel -   -  Ulnar Nerve Subluxation -   -  Distal Biceps Abnormality -   -  Triceps Abnormality  -   -  Other tenderness  -   -  Other Deformity  -   -  Olecranon Bursitis  -   + Golf ball size  Warmth   -   -  Erythema   -   -  Additional Findings: No erythema no warmth over the posterior elbow. IMAGING:  No imaging today    ASSESSMENT & PLAN  Diagnosis: Left elbow olecranon bursitis    Bhavesh has left elbow olecranon bursitis. We discussed the treatment options today including the possibility of surgery. We will start with an aspiration today and corticosteroid injection. He will continue to wear an elbow brace. A compressive dressing was placed over the elbow after the aspiration and injection was performed. He tolerated it well. Follow-up as needed.     [facility]  OFFICE PROCEDURE PROGRESS NOTE        Chart reviewed for the following:   Noemi Cook MD, have reviewed the History, Physical and updated the Allergic reactions for [address] performed immediately prior to start of procedure:   I, Sherri Castillo MD, have performed the following reviews on [address] [place] [address]. prior to the start of the procedure:            * Patient was identified by name and date of birth   * Agreement on procedure being performed was verified  * Risks and Benefits explained to the patient  * Procedure site verified and marked as necessary  * Patient was positioned for comfort  * Consent was signed and verified    Time: 8:48 AM    Location: Left elbow olecranon bursa    Aspiration 25ccs, Injection Kenalog 40mg & 1 cc Lidocaine    Date of procedure: 6/16/2021    Procedure performed by:  Sherri Castillo MD    Provider assisted by: Becki Simmons LPN    Patient assisted by: self    How tolerated by patient: tolerated the procedure well with no complications    Post Procedural Pain Scale: 0 - No Hurt    Comments: none        Electronically signed by: Teri Thompson MD

## 2021-08-10 ENCOUNTER — OFFICE VISIT (OUTPATIENT)
Dept: ORTHOPEDIC SURGERY | Age: 76
End: 2021-08-10
Payer: MEDICARE

## 2021-08-10 VITALS
WEIGHT: 235 LBS | HEART RATE: 75 BPM | BODY MASS INDEX: 32.9 KG/M2 | HEIGHT: 71 IN | TEMPERATURE: 97.1 F | OXYGEN SATURATION: 97 %

## 2021-08-10 DIAGNOSIS — M54.50 CHRONIC LEFT-SIDED LOW BACK PAIN WITHOUT SCIATICA: Primary | ICD-10-CM

## 2021-08-10 DIAGNOSIS — M54.10 RADICULAR PAIN OF LEFT LOWER EXTREMITY: ICD-10-CM

## 2021-08-10 DIAGNOSIS — G89.29 CHRONIC LEFT-SIDED LOW BACK PAIN WITHOUT SCIATICA: Primary | ICD-10-CM

## 2021-08-10 PROCEDURE — G8432 DEP SCR NOT DOC, RNG: HCPCS | Performed by: PHYSICIAN ASSISTANT

## 2021-08-10 PROCEDURE — 99214 OFFICE O/P EST MOD 30 MIN: CPT | Performed by: PHYSICIAN ASSISTANT

## 2021-08-10 PROCEDURE — 72100 X-RAY EXAM L-S SPINE 2/3 VWS: CPT | Performed by: PHYSICIAN ASSISTANT

## 2021-08-10 PROCEDURE — 1101F PT FALLS ASSESS-DOCD LE1/YR: CPT | Performed by: PHYSICIAN ASSISTANT

## 2021-08-10 PROCEDURE — G8417 CALC BMI ABV UP PARAM F/U: HCPCS | Performed by: PHYSICIAN ASSISTANT

## 2021-08-10 PROCEDURE — G8536 NO DOC ELDER MAL SCRN: HCPCS | Performed by: PHYSICIAN ASSISTANT

## 2021-08-10 PROCEDURE — G8427 DOCREV CUR MEDS BY ELIG CLIN: HCPCS | Performed by: PHYSICIAN ASSISTANT

## 2021-08-10 RX ORDER — GABAPENTIN 300 MG/1
300 CAPSULE ORAL
Qty: 30 CAPSULE | Refills: 2 | Status: SHIPPED | OUTPATIENT
Start: 2021-08-10 | End: 2021-09-10 | Stop reason: SDUPTHER

## 2021-08-10 RX ORDER — METHYLPREDNISOLONE 4 MG/1
TABLET ORAL
Qty: 1 DOSE PACK | Refills: 0 | Status: SHIPPED | OUTPATIENT
Start: 2021-08-10

## 2021-08-10 NOTE — PROGRESS NOTES
Alyse Jordan.  1945   Chief Complaint   Patient presents with    Back Pain     lower    Hip Pain     left    Leg Pain     left    Foot Pain     left        HISTORY OF PRESENT ILLNESS  Alyse Giron is a 67 y.o. male who presents today for evaluation of left leg pain and buttock pain x 10 months. He notes this has worsened since his 2 falls. Last fall was 6-8 weeks ago. He states he notices numbness and tingling that go all the way down his leg into his toes. It is worse when he is trying to sleep at night. He has been taking Tylenol with minimal relief. He denies any bowel or bladder incontinence. Pain is a 6/10. Has tried following treatments: Injections:NO; Brace:NO;  Therapy:NO; Cane/Crutch:NO       Allergies   Allergen Reactions    Penicillins Unknown (comments)     Other reaction(s): mild rash/itching        Past Medical History:   Diagnosis Date    Arthritis     Calculus of kidney     Chronic obstructive pulmonary disease (HCC)     Diabetes ([facility] [address]     Diabetes mellitus     Diverticulitis     Left foot pain     Left hip pain     Left leg pain     Lower back pain     Stricture or kinking of ureter     Unspecified hyperplasia of prostate with urinary obstruction and other lower urinary tract symptoms (LUTS)       Social History     Socioeconomic History    Marital status:      Spouse name: Not on file    Number of children: Not on file    Years of education: Not on file    Highest education level: Not on file   Occupational History    Not on file   Tobacco Use    Smoking status: Former Smoker    Smokeless tobacco: Current User   Vaping Use    Vaping Use: Every day   Substance and Sexual Activity    Alcohol use: Not Currently     Alcohol/week: 0.8 standard drinks     Types: 1 Shots of liquor per week     Comment: seldom    Drug use: No    Sexual activity: Not Currently   Other Topics Concern    Not on file   Social History Narrative    Not on file Social Determinants of Health     Financial Resource Strain:     Difficulty of Paying Living Expenses:    Food Insecurity:     Worried About Running Out of Food in the Last Year:     [address] in the Last Year:    Transportation Needs:     Lack of Transportation (Medical):  Lack of Transportation (Non-Medical):    Physical Activity:     Days of Exercise per Week:     Minutes of Exercise per Session:    Stress:     Feeling of Stress :    Social Connections:     Frequency of Communication with Friends and Family:     Frequency of Social Gatherings with Friends and Family:     Attends Spiritism Services:     Active Member of Clubs or Organizations:     Attends Club or Organization Meetings:     Marital Status:    Intimate Partner Violence:     Fear of Current or Ex-Partner:     Emotionally Abused:     Physically Abused:     Sexually Abused:       Past Surgical History:   Procedure Laterality Date    CYSTOSCOPY  2/12/2009    Left ureteroplvic junction - stone removed with basket    HX BACK SURGERY      HX GASTRIC BYPASS  2005    HX ORTHOPAEDIC Left     shoulder sx    HX OTHER SURGICAL      Left shoulder    HX OTHER SURGICAL      4-5 Lumbar disc    HX UROLOGICAL      [place] ANEST[place],SURGERY OF SHOULDER      [place] LAP,PYELOPLASTY      Left      Family History   Problem Relation Age of Onset    Diabetes Mother     Cancer Father     Cancer Other     Diabetes Other       Current Outpatient Medications   Medication Sig    gabapentin (NEURONTIN) 300 mg capsule Take 1 Capsule by mouth nightly.  Max Daily Amount: 300 mg.    methylPREDNISolone (MEDROL DOSEPACK) 4 mg tablet Per dose pack instructions   Ramilaminia Fran Armijo FlexTouch U-100 100 unit/mL (3 mL) inpn inject 15 units subcutaneously daily    BD Maria 2nd Gen Pen Needle 32 gauge x 5/32\" ndle use 1 daily subcutaneously daily    Jardiance 25 mg tablet take 1 tablet by mouth once daily    pioglitazone (ACTOS) 45 mg tablet take 1 tablet by mouth once daily    acetaminophen (TYLENOL ARTHRITIS PAIN PO) Take 1,000 mg by mouth every six (6) hours as needed for Pain. (Patient not taking: Reported on 6/16/2021)    vit A/vit C/vit E/zinc/copper (PRESERVISION AREDS PO) Take 1 Tab by mouth daily. (Patient not taking: Reported on 6/16/2021)    amiodarone (PACERONE) 100 mg tablet Take 50 mg by mouth daily. Indications: prevention of recurrent atrial fibrillation    linagliptin (TRADJENTA) 5 mg tablet Take 5 mg by mouth daily.  oxyCODONE-acetaminophen (PERCOCET) 5-325 mg per tablet Take 1 Tab by mouth every four (4) hours as needed for Pain. Max Daily Amount: 6 Tabs. (Patient not taking: Reported on 6/16/2021)    cyanocobalamin (B-12 DOTS) 500 mcg tablet Take 500 mcg by mouth daily.  ferrous sulfate (IRON) 325 mg (65 mg Iron) tablet Take  by mouth daily (before breakfast).  GLIPIZIDE (GLUCOTROL PO) Take  by mouth.  OMEPRAZOLE (PRILOSEC PO) Take  by mouth.  SIMVASTATIN (ZOCOR PO) Take  by mouth.  hydrochlorothiazide (HYDRODIURIL) 50 mg tablet Take 25 mg by mouth daily. No current facility-administered medications for this visit. REVIEW OF SYSTEM   Patient denies: Weight loss, Fever/Chills, HA, Visual changes, Fatigue, Chest pain, SOB, Abdominal pain, N/V/D/C, Blood in stool or urine, Edema. Pertinent positive as above in HPI. All others were negative    PHYSICAL EXAM:   Visit Vitals  Pulse 75   Temp 97.1 °F (36.2 °C) (Skin)   Ht 5' 11\" (1.803 m)   Wt 235 lb (106.6 kg)   SpO2 97%   BMI 32.78 kg/m²     The patient is a well-developed, well-nourished male   in no acute distress. The patient is alert and oriented times three. The patient is alert and oriented times three. Mood and affect are normal.  LYMPHATIC: lymph nodes are not enlarged and are within normal limits  SKIN: normal in color and non tender to palpation. There are no bruises or abrasions noted. NEUROLOGICAL: Motor sensory exam is within normal limits.  Reflexes are equal bilaterally. There is normal sensation to pinprick and light touch  MUSCULOSKELETAL:  Examination Lumbar   Skin Intact   Tenderness, Paraspinal muscles +   Paraspinal muscle spasms +   Flexion Fingertips to ankle   Extension 10   Knee reflexes Normal   Ankle reflexes Normal   Straight leg raise -   Calf tenderness -         IMAGIN view x-rays of lumbar spine taken in the office on 8/10/2021 read and reviewed by myself reveal degenerative arthritis with decreased joint space and L3-L4 and L5. IMPRESSION:      ICD-10-CM ICD-9-CM    1. Chronic left-sided low back pain without sciatica  M54.5 724.2 AMB POC XRAY, SPINE, LUMBOSACRAL; 2 O    G89.29 338.29 REFERRAL TO PHYSICAL THERAPY      gabapentin (NEURONTIN) 300 mg capsule      methylPREDNISolone (MEDROL DOSEPACK) 4 mg tablet   2. Radicular pain of left lower extremity  M54.10 724.4 REFERRAL TO PHYSICAL THERAPY        PLAN:   1. Patient with radicular pain left lower extremity consistent with lumbar spondylosis. We will place him on a Medrol Dosepak. We will also place him on gabapentin at night. Will refer him to physical therapy. If pain persists he will see the spine center  Risk factors include: Advanced age  3. No cortisone injection indicated today   3. Yes Physical/Occupational Therapy indicated today  4. No diagnostic test indicated today:   5. No durable medical equipment indicated today  6. No referral indicated today   7. Yes medications indicated today:   8.  No Narcotic indicated today  RTC 4 weeks     [facility]

## 2021-08-12 ENCOUNTER — HOSPITAL ENCOUNTER (OUTPATIENT)
Dept: PHYSICAL THERAPY | Age: 76
Discharge: HOME | End: 2021-08-12
Attending: PHYSICIAN ASSISTANT
Payer: MEDICARE

## 2021-08-12 PROCEDURE — 97162 PT EVAL MOD COMPLEX 30 MIN: CPT | Performed by: PHYSICAL THERAPIST

## 2021-08-12 PROCEDURE — 97110 THERAPEUTIC EXERCISES: CPT | Performed by: PHYSICAL THERAPIST

## 2021-08-12 NOTE — PROGRESS NOTES
PT DAILY TREATMENT NOTE/LUMBAR EVAL     Patient Name: Osei Markham. Date:2021  : 1945  [x]  Patient  Verified  Payor: Belknap Eduardo / Plan: Chip Alicea / Product Type: Managed Care Medicare /    In time:8:45  Out time:9:30  Total Treatment Time (min): 45  Visit #: 1 of 8    Medicare/BCBS Only   Total Timed Codes (min):  25 1:1 Treatment Time:  45     Treatment Area: Low back pain [M54.5]  Pain in left leg [M79.605]  SUBJECTIVE  Pain Level (0-10 scale): 2-3/10 now; 2-3/10 at best; 7-8/10 at worst.  [x]constant []intermittent []improving []worsening [x]no change since onset    Any medication changes, allergies to medications, adverse drug reactions, diagnosis change, or new procedure performed?: [x] No    [] Yes (see summary sheet for update)  Subjective functional status/changes:     PLOF: Independent activity, doing yard work, some electrical work. Active on his boat 3x per week. Limitations to PLOF: Notes he is not doing much right now due to the pain. Mechanism of Injury: Patient fell in  on his boat. Landed on his left side. Current symptoms/Complaints: CC is pain in the left lower back and hip. States he feels the pain is more in the hip. This has affected his ability to do almost anything right now. Has numbness and tingling in his left foot and toes, this is a new problem he has never had prior. Previous Treatment/Compliance: Medrol Pack, gabapentin  PMHx/Surgical Hx: Back surgery , TSA, TKA  Work Hx: Retired but he still does some side work. Pt Goals: \"I would like to be able to walk and sleep\"  Barriers: [x]pain []financial []time []transportation []other  Cognition: A & O x 3    Other:    OBJECTIVE/EXAMINATION  Domestic Life: Lives with his wife. Activity/Recreational Limitations: Limited in most activities right now. Mobility: ambulates FWB, no AD  Self Care: Independent.       20 min [x]Eval                  []Re-Eval       25 min Therapeutic Exercise:  [] See flow sheet :   Rationale: increase ROM and increase strength to improve the patients ability to increase his functional activity level. With   [] TE   [] TA   [] neuro   [] other: Patient Education: [x] Review HEP    [] Progressed/Changed HEP based on:   [] positioning   [] body mechanics   [] transfers   [] heat/ice application    [] other:      Other Objective/Functional Measures:     Physical Therapy Evaluation - Lumbar Spine ([facility])    SUBJECTIVE  Symptoms:  Aggravated by:   [] Bending [] Sitting [x] Standing [x] Walking more than 2 minutes. [] Moving [] Cough [] Sneeze [] Valsalva   [] AM  [x] PM  Lying:  [x] sup   [] pro   [x] sidelying left   [] Other:     Eased by:    [] Bending [x] Sitting [] Standing [] Walking   [x] Moving [x] AM  [] PM  Lying: [] sup  [] pro  [x] sidelying right   [] Other:     Diagnostic Tests: [] Lab work [x] X-rays    [] CT [] MRI     [] Other:  Results:  From RICHARD Cox on 8/10/2021:  2 view x-rays of lumbar spine taken in the office on 8/10/2021 read and reviewed by myself reveal degenerative arthritis with decreased joint space and L3-L4 and L5. OBJECTIVE  Posture:  Lateral Shift: [] R    [] L     [] +  [x] -  Kyphosis: [x] Increased [] Decreased   []  WNL  Lordosis:  [] Increased [x] Decreased   [] WNL  Pelvic symmetry: [x] WNL    [] Other:    Gait:  [] Normal     [x] Abnormal: antalgic gait, left lateral lean. No AD.     Active Movements: [] N/A   [] Too acute   [] Other:  ROM % AROM limitation  Comments:pain, area   Forward flexion 40-60 25%  Pain left buttock   Extension 20-30 100%     SB right 20-30 WNL     SB left 20-30 25%  (+) pain, left lateral   Rotation right 5-10 WNL  (+) pain, left buttock   Rotation left 5-10 WNL         Dural Mobility:  SLR Sitting: [] R    [] L    [] +    [x] -  @ (degrees):           Supine: [] R    [x] L    [x] +    [] -  @ (degrees):   Slump Test: [] R    [x] L    [x] +    [] -  @ (degrees):   Prone Knee Bend: [] R    [] L    [] +    [] -     Palpation  [] Min  [x] Mod  [] Severe    Location: Left PSIS  [] Min  [x] Mod  [] Severe    Location: Left SI joint    Strength   L(0-5) R (0-5) N/T   Hip Flexion (L1,2) 4 p! 4+ []   Knee Extension (L3,4) 4+ 4+ []   Ankle Dorsiflexion (L4) 5 5 []   Great Toe Extension (L5)   [x]   Ankle Plantarflexion (S1) 5 5 []   Knee Flexion (S1,2) 4+ 4+ []   Hip Extension (S1,2) 3 3 []   Hip Abduction (L5) 2 3 []      []      []      []   abdominals 2/5  []     Special Tests  Lumbar:  Iliolumbar Ligament Test: [x] Pos  [] Neg Left. Lumbar Distraction:   [x] Pos  [] Neg    Leg Length: [] +    [x] -   Position: supine    Crests: level in standing    Mobility: Standing flex: (-)            Hip: Tsosie Linda:  [] R    [x] L    [x] +    [] -     Scour:  [] R    [x] L    [] +    [x] -     Piriformis: [] R    [x] L    [x] +    [] -       Pain Level (0-10 scale) post treatment: 2-3    ASSESSMENT/Changes in Function: Patient with signs and symptoms consistent with left lower back pain with left LE referred pain. Patient has an altered gait and difficulty with transfers and mobility. He has mild loss of motion of the L/S. He has core and proximal strength deficits. Functionally, he is limited due to pain and is not able to do much of his normal activities. There is tenderness at the left PSIS and into the left SI joint. He does not have a leg length discrepancy. Patient will continue to benefit from skilled PT services to modify and progress therapeutic interventions, address functional mobility deficits, address ROM deficits, address strength deficits, analyze and address soft tissue restrictions, analyze and cue movement patterns, analyze and modify body mechanics/ergonomics and assess and modify postural abnormalities to attain remaining goals.      [x]  See Plan of Care  []  See progress note/recertification  []  See Discharge Summary         Progress towards goals / Updated goals:  Short Term Goals: To be accomplished in 1 weeks:  1. Patient will become proficient in their HEP and will be compliant in performing that program.  Evaluation:   Patient given a written/illustrated HEP.     Long Term Goals: To be accomplished in 4 weeks:  1. Patient's pain level will be 2-3/10 with activity in order to improve patient's ability to perform normal ADLs. Evaluation:  2/10-8/10  2. Patient will demonstrate AROM Lumbar Spine with no more than 25% limitation, with no pain, to increase ease of ADLs. Evaluation:  AROM L/S flexion limited 25% with pain; extension limited 100%; Right SB WNL, Left SB 25% limited with pain; Right Rotation WNL with pain, Left Rotation WNL. 3. Patient will increase FOTO score to 55 to indicate increased functional mobility. Evaluation:  39  4. Patient will tolerate walking for 10 minutes in order to increase his functional activity level. Evaluation:  Walking more than 2 minutes increases his pain.     PLAN  [x]  Upgrade activities as tolerated     [x]  Continue plan of care  []  Update interventions per flow sheet       []  Discharge due to:_  []  Other:_      Shilpi Clinton, PT 8/12/2021  7:24 AM

## 2021-08-12 NOTE — PROGRESS NOTES
[facility]              [address]        [place], [address]   [phone] fax    Plan of Care/ Statement of Necessity for Physical Therapy Services    Patient name: Jerrell Lundborg Start of Care: 2021   Referral source: Dolores Bean Demetriojuanma : 1945    Medical Diagnosis: Low back pain [M54.5]  Pain in left leg [M79.605]  Payor: Renaldo Larios / Plan: Edmund Cervantes / Product Type: Managed Care Medicare /  Onset Date:2021    Treatment Diagnosis: Low back pain with left LE pain. Prior Hospitalization: see medical history Provider#: [id number]   Medications: Verified on Patient summary List    Comorbidities: Arthritis, Back Pain, BMI 32.1, Diabetes, GI Disease, previous falls, prior surgeries, Sleep dysfunction. Prior Level of Function: Independent activity, doing yard work, some electrical work. Active on his boat 3x per week. The Plan of Care and following information is based on the information from the initial evaluation. Assessment/ key information: Patient with signs and symptoms consistent with left lower back pain with left LE referred pain. Patient has an altered gait and difficulty with transfers and mobility. He has mild loss of motion of the L/S. He has core and proximal strength deficits. Functionally, he is limited due to pain and is not able to do much of his normal activities. There is tenderness at the left PSIS and into the left SI joint. He does not have a leg length discrepancy. Patient will benefit from a program of skilled physical therapy to include therapeutic exercises to address strength deficits, therapeutic activities to improve functional mobility, neuromuscular reeducation to address balance, coordination and proprioception, manual therapy to address ROM and tissue extensibility and modalities as indicated. All questions were answered.     Evaluation Complexity History HIGH Complexity :3+ comorbidities / personal factors will impact the outcome/ POC ; Examination MEDIUM Complexity : 3 Standardized tests and measures addressing body structure, function, activity limitation and / or participation in recreation  ;Presentation MEDIUM Complexity : Evolving with changing characteristics  ; Clinical Decision Making MEDIUM Complexity : FOTO score of 26-74  Overall Complexity Rating: MEDIUM  Problem List: pain affecting function, decrease ROM, decrease strength, impaired gait/ balance, decrease ADL/ functional abilitiies, decrease activity tolerance, decrease flexibility/ joint mobility and decrease transfer abilities   Treatment Plan may include any combination of the following: Therapeutic exercise, Therapeutic activities, Neuromuscular re-education, Physical agent/modality and Manual therapy  Patient / Family readiness to learn indicated by: asking questions, trying to perform skills and interest  Persons(s) to be included in education: patient (P)  Barriers to Learning/Limitations: None  Patient Goal (s): I would like to be able to walk and sleep  Patient Self Reported Health Status: fair  Rehabilitation Potential: good    Short Term Goals: To be accomplished in 1 weeks:  1. Patient will become proficient in their HEP and will be compliant in performing that program.  Evaluation:   Patient given a written/illustrated HEP. Long Term Goals: To be accomplished in 4 weeks:  1. Patient's pain level will be 2-3/10 with activity in order to improve patient's ability to perform normal ADLs. Evaluation:  2/10-8/10  2. Patient will demonstrate AROM Lumbar Spine with no more than 25% limitation, with no pain, to increase ease of ADLs. Evaluation:  AROM L/S flexion limited 25% with pain; extension limited 100%; Right SB WNL, Left SB 25% limited with pain; Right Rotation WNL with pain, Left Rotation WNL.   3. Patient will increase FOTO score to 55 to indicate increased functional mobility. Evaluation:  39  4. Patient will tolerate walking for 10 minutes in order to increase his functional activity level. Evaluation:  Walking more than 2 minutes increases his pain. Frequency / Duration: Patient to be seen 2 times per week for 4 weeks. Patient/ Caregiver education and instruction: Diagnosis, prognosis, exercises   [x]  Plan of care has been reviewed with PTA      Certification Period: 8/12/2021 - 9/10/2021  Vidhya Barreto, PT 8/12/2021 7:22 AM  ________________________________________________________________________    I certify that the above Therapy Services are being furnished while the patient is under my care. I agree with the treatment plan and certify that this therapy is necessary.     [de-identified] Signature:____________Date:_________TIME:________     Metta Opitz, PA  ** Signature, Date and Time must be completed for valid certification **  Please sign and return to [facility]              [address], [address] [place]   [phone] fax

## 2021-08-19 ENCOUNTER — HOSPITAL ENCOUNTER (OUTPATIENT)
Dept: PHYSICAL THERAPY | Age: 76
Discharge: HOME | End: 2021-08-19
Attending: PHYSICIAN ASSISTANT
Payer: MEDICARE

## 2021-08-19 PROCEDURE — 97110 THERAPEUTIC EXERCISES: CPT

## 2021-08-19 PROCEDURE — 97112 NEUROMUSCULAR REEDUCATION: CPT

## 2021-08-19 NOTE — PROGRESS NOTES
PT DAILY TREATMENT NOTE     Patient Name: Tierra Bermudez. Date:2021  : 1945  [x]  Patient  Verified  Payor: José Manuel Cherry / Plan: Iza Lange / Product Type: Managed Care Medicare /    In time: 2:46P  Out time:3:24P  Total Treatment Time (min): 38  Visit #: 2 of 8    Medicare/BCBS Only   Total Timed Codes (min):  38 1:1 Treatment Time:  38       Treatment Area: Low back pain [M54.5]  Pain in left leg [M79.605]    SUBJECTIVE  Pain Level (0-10 scale): 0  Any medication changes, allergies to medications, adverse drug reactions, diagnosis change, or new procedure performed?: [x] No    [] Yes (see summary sheet for update)  Subjective functional status/changes:   [] No changes reported  Pt reports full compliance with prescribed HEP w/ no issues to report. States \"I think it's helping, I'm sleeping a lot better\"    OBJECTIVE    23 min Therapeutic Exercise:  [x] See flow sheet :   Rationale: increase ROM and increase strength to improve the patients ability to perform ADLs with greater ease     15 min Neuromuscular Re-education:  [x]  See flow sheet :   Rationale: increase strength and increase proprioception  to improve the patients ability to stand/ambulate with greater ease          With   [x] TE   [] TA   [x] neuro   [] other: Patient Education: [x] Review HEP    [] Progressed/Changed HEP based on:   [] positioning   [] body mechanics   [] transfers   [] heat/ice application    [] other:      Other Objective/Functional Measures: Introduced activities per POC      Pain Level (0-10 scale) post treatment: 0    ASSESSMENT/Changes in Function:   Introduced activities per POC - challenged w/ left LE isolated WB activities, however demonstrates ability to complete all exercises w/o an increase in symptoms.     Patient will continue to benefit from skilled PT services to modify and progress therapeutic interventions, address functional mobility deficits, address ROM deficits, address strength deficits, analyze and address soft tissue restrictions, analyze and cue movement patterns, analyze and modify body mechanics/ergonomics and assess and modify postural abnormalities to attain remaining goals. [x]  See Plan of Care  []  See progress note/recertification  []  See Discharge Summary         Progress towards goals / Updated goals:  Short Term Goals: To be accomplished in 1 weeks:  1. Patient will become proficient in their HEP and will be compliant in performing that program.  Evaluation:   Patient given a written/illustrated HEP. Current: Goal Met: pt reports full compliance and demonstrates proficiency, 08/19/21     Long Term Goals: To be accomplished in 4 weeks:  1. Patient's pain level will be 2-3/10 with activity in order to improve patient's ability to perform normal ADLs. Evaluation:  2/10-8/10  2. Patient will demonstrate AROM Lumbar Spine with no more than 25% limitation, with no pain, to increase ease of ADLs. Evaluation:  AROM L/S flexion limited 25% with pain; extension limited 100%; Right SB WNL, Left SB 25% limited with pain; Right Rotation WNL with pain, Left Rotation WNL. 3. Patient will increase FOTO score to 55 to indicate increased functional mobility. Evaluation:  39  4. Patient will tolerate walking for 10 minutes in order to increase his functional activity level. Evaluation:  Walking more than 2 minutes increases his pain.     PLAN  []  Upgrade activities as tolerated     [x]  Continue plan of care  []  Update interventions per flow sheet       []  Discharge due to:_  []  Other:_      Lorenzo Grier DPT 8/19/2021  12:54 PM    Future Appointments   Date Time Provider Gonzalo Huerta   8/19/2021  2:45 PM Umair Benítez DPT [facility]   8/26/2021  2:45 PM Blinda Sharp, PTA [facility]   9/2/2021  2:45 PM Blinda Sharp, PTA [facility]   9/9/2021  2:45 PM Blinda Sharp, PTA [facility]   9/10/2021  1:00 PM [place], Rupert Cockayne, PA [facility] BS AMB 4/26/2022  1:00 PM MD ANTONIA Zacarias BS AMB

## 2021-08-26 ENCOUNTER — HOSPITAL ENCOUNTER (OUTPATIENT)
Dept: PHYSICAL THERAPY | Age: 76
Discharge: HOME | End: 2021-08-26
Attending: PHYSICIAN ASSISTANT
Payer: MEDICARE

## 2021-08-26 PROCEDURE — 97110 THERAPEUTIC EXERCISES: CPT

## 2021-08-26 PROCEDURE — 97112 NEUROMUSCULAR REEDUCATION: CPT

## 2021-08-26 NOTE — PROGRESS NOTES
PT DAILY TREATMENT NOTE     Patient Name: Tito Berger. Date:2021  : 1945  [x]  Patient  Verified  Payor: Julio Barry / Plan: Fuentes Albert / Product Type: Managed Care Medicare /    In time:2:47  Out time:3:28  Total Treatment Time (min): 41  Visit #: 3 of 8    Medicare/BCBS Only   Total Timed Codes (min):  41 1:1 Treatment Time:  41       Treatment Area: Low back pain [M54.5]  Pain in left leg [M79.605]    SUBJECTIVE  Pain Level (0-10 scale): 3  Any medication changes, allergies to medications, adverse drug reactions, diagnosis change, or new procedure performed?: [x] No    [] Yes (see summary sheet for update)  Subjective functional status/changes:   [] No changes reported  Patient reports he went and cut his neighbors yard and it took him 3 hours on a zero turn . Patient states he has had increase pain since. OBJECTIVE     23 min Therapeutic Exercise:  [x]? See flow sheet :   Rationale: increase ROM and increase strength to improve the patients ability to perform ADLs with greater ease      18 min Neuromuscular Re-education:  [x]? See flow sheet :   Rationale: increase strength and increase proprioception  to improve the patients ability to stand/ambulate with greater ease         With   [] TE   [] TA   [] neuro   [] other: Patient Education: [x] Review HEP    [] Progressed/Changed HEP based on:   [] positioning   [] body mechanics   [] transfers   [] heat/ice application    [] other:      Other Objective/Functional Measures:  walking tolerance 5 mins     Pain Level (0-10 scale) post treatment: 2    ASSESSMENT/Changes in Function: Patient ambulates with antalgic/trendelenburg gait pattern. Suggested patient utilize [facility] to help manage pain with ambulation.      Patient will continue to benefit from skilled PT services to modify and progress therapeutic interventions, address functional mobility deficits, address ROM deficits, address strength deficits and analyze and address soft tissue restrictions to attain remaining goals. []  See Plan of Care  []  See progress note/recertification  []  See Discharge Summary         Progress towards goals / Updated goals:  Short Term Goals: To be accomplished in 1 weeks:  1.  Patient will become proficient in their HEP and will be compliant in performing that program.  Evaluation:   Patient given a written/illustrated HEP. Current: Goal Met: pt reports full compliance and demonstrates proficiency, 08/19/21     Long Term Goals: To be accomplished in 4 weeks:  1. Patient's pain level will be 2-3/10 with activity in order to improve patient's ability to perform normal ADLs. Evaluation:  2/10-8/10  Current: not met: 0/10-10/10. 8/26/21  2. Patient will demonstrate AROM Lumbar Spine with no more than 25% limitation, with no pain, to increase ease of ADLs. Evaluation:  AROM L/S flexion limited 25% with pain; extension limited 100%; Right SB WNL, Left SB 25% limited with pain; Right Rotation WNL with pain, Left Rotation WNL. 3. Patient will increase FOTO score to 55 to indicate increased functional mobility. Evaluation:  39  4. Patient will tolerate walking for 10 minutes in order to increase his functional activity level. Evaluation:  Walking more than 2 minutes increases his pain. Current: progressing: walking tolerance 5 mins.  8/26/21       PLAN  []  Upgrade activities as tolerated     [x]  Continue plan of care  []  Update interventions per flow sheet       []  Discharge due to:_  []  Other:_      Les Birminghams, PTA 8/26/2021  2:39 PM    Future Appointments   Date Time Provider Gonzalo Huerta   8/26/2021  2:45 PM Rigo Reasons, [place] [facility]   9/2/2021  2:45 PM Rigo Reasons, [place] [facility]   9/9/2021  2:45 PM Rigo Reasons, PTA [facility]   9/10/2021  1:00 PM RICHARD Isaac [facility] BS AMB   4/26/2022  1:00 PM Lonnie Hernandez MD [facility] BS AMB

## 2021-09-02 ENCOUNTER — HOSPITAL ENCOUNTER (OUTPATIENT)
Dept: PHYSICAL THERAPY | Age: 76
Discharge: HOME | End: 2021-09-02
Attending: PHYSICIAN ASSISTANT
Payer: MEDICARE

## 2021-09-02 PROCEDURE — 97112 NEUROMUSCULAR REEDUCATION: CPT

## 2021-09-02 PROCEDURE — 97110 THERAPEUTIC EXERCISES: CPT

## 2021-09-02 NOTE — PROGRESS NOTES
PT DAILY TREATMENT NOTE     Patient Name: Dariusz Odell. Date:2021  : 1945  [x]  Patient  Verified  Payor: Deanne Cano / Plan: Jeremy Do / Product Type: Managed Care Medicare /    In time:2:45  Out time:3:25  Total Treatment Time (min): 40  Visit #: 4 of 8    Medicare/BCBS Only   Total Timed Codes (min):  40 1:1 Treatment Time:  40       Treatment Area: Low back pain [M54.5]  Pain in left leg [M79.605]    SUBJECTIVE  Pain Level (0-10 scale): 2  Any medication changes, allergies to medications, adverse drug reactions, diagnosis change, or new procedure performed?: [x] No    [] Yes (see summary sheet for update)  Subjective functional status/changes:   [] No changes reported  Patient states he has not noticed an improvement with his hip since starting therapy. Patient states he has been using a SPC that has been helpful with decreasing pain. OBJECTIVE       23 min Therapeutic Exercise:  [x]? ? See flow sheet :   Rationale: increase ROM and increase strength to improve the patients ability to perform ADLs with greater ease      17 min Neuromuscular Re-education:  [x]? ?  See flow sheet :   Rationale: increase strength and increase proprioception  to improve the patients ability to stand/ambulate with greater ease             With   [] TE   [] TA   [] neuro   [] other: Patient Education: [x] Review HEP    [] Progressed/Changed HEP based on:   [] positioning   [] body mechanics   [] transfers   [] heat/ice application    [] other:      Other Objective/Functional Measures: AROM L/S flexion WNL with pain, extension limited 25%, left SB WNL pain, right SB WNL, B rotation WNL no pain. Pain Level (0-10 scale) post treatment: 2    ASSESSMENT/Changes in Function: Patient states he has stopped performing recumbent bike at home after having increase pain after performing it for 5 mins. Encouraged patient to perform for 2 mis and build a tolerance up to five mins.    Patient will continue to benefit from skilled PT services to modify and progress therapeutic interventions, address functional mobility deficits, address ROM deficits, address strength deficits and analyze and address soft tissue restrictions to attain remaining goals. []  See Plan of Care  []  See progress note/recertification  []  See Discharge Summary         Progress towards goals / Updated goals:  Short Term Goals: To be accomplished in 1 weeks:  1.  Patient will become proficient in their HEP and will be compliant in performing that program.  Evaluation:   Patient given a written/illustrated HEP. Current: Goal Met: pt reports full compliance and demonstrates proficiency, 08/19/21     Long Term Goals: To be accomplished in 4 weeks:  1. Patient's pain level will be 2-3/10 with activity in order to improve patient's ability to perform normal ADLs. Evaluation:  2/10-8/10  Current: not met: 0/10-10/10. 8/26/21  2. Patient will demonstrate AROM Lumbar Spine with no more than 25% limitation, with no pain, to increase ease of ADLs. Evaluation:  AROM L/S flexion limited 25% with pain; extension limited 100%; Right SB WNL, Left SB 25% limited with pain; Right Rotation WNL with pain, Left Rotation WNL. Current: Progressing:  AROM L/S flexion WNL with pain, extension limited 25%, left SB WNL pain, right SB WNL, B rotation WNL no pain. 9/2/21  3. Patient will increase FOTO score to 55 to indicate increased functional mobility. Evaluation:  39  4. Patient will tolerate walking for 10 minutes in order to increase his functional activity level. Evaluation:  Walking more than 2 minutes increases his pain. Current: progressing: walking tolerance 5 mins.  8/26/21       PLAN  []  Upgrade activities as tolerated     [x]  Continue plan of care  []  Update interventions per flow sheet       []  Discharge due to:_  []  Other:_      Manuel Fish, PTA 9/2/2021  2:47 PM    Future Appointments   Date Time Provider Gonzalo Huerta 9/9/2021  2:45 PM Mena Christopher PTA [facility]   9/10/2021  1:00 PM RICHARD Fulton [facility] BS AMB   4/26/2022  1:00 PM Rigo Gibbons MD [facility] BS AMB

## 2021-09-09 ENCOUNTER — HOSPITAL ENCOUNTER (OUTPATIENT)
Dept: PHYSICAL THERAPY | Age: 76
Discharge: HOME | End: 2021-09-09
Attending: PHYSICIAN ASSISTANT
Payer: MEDICARE

## 2021-09-09 PROCEDURE — 97112 NEUROMUSCULAR REEDUCATION: CPT

## 2021-09-09 PROCEDURE — 97110 THERAPEUTIC EXERCISES: CPT

## 2021-09-09 NOTE — PROGRESS NOTES
PT DAILY TREATMENT NOTE     Patient Name: Wendi Barakat. Date:2021  : 1945  [x]  Patient  Verified  Payor: Vertell Claude / Plan: Elbert Half / Product Type: Managed Care Medicare /    In time:2:42  Out time:3:23  Total Treatment Time (min): 41  Visit #: 5 of 8    Medicare/BCBS Only   Total Timed Codes (min):  41 1:1 Treatment Time:  41       Treatment Area: Low back pain [M54.5]  Pain in left leg [M79.605]    SUBJECTIVE  Pain Level (0-10 scale): 2  Any medication changes, allergies to medications, adverse drug reactions, diagnosis change, or new procedure performed?: [x] No    [] Yes (see summary sheet for update)  Subjective functional status/changes:   [] No changes reported  Patient states he has started back using his peddle bike and is performing 3 mins without having pain. OBJECTIVE       23 min Therapeutic Exercise:  [x]? ?? See flow sheet :   Rationale: increase ROM and increase strength to improve the patients ability to perform ADLs with greater ease      18 min Neuromuscular Re-education:  [x]? ??  See flow sheet :   Rationale: increase strength and increase proprioception  to improve the patients ability to stand/ambulate with greater ease         With   [] TE   [] TA   [] neuro   [] other: Patient Education: [x] Review HEP    [] Progressed/Changed HEP based on:   [] positioning   [] body mechanics   [] transfers   [] heat/ice application    [] other:      Other Objective/Functional Measures: see goals     Pain Level (0-10 scale) post treatment: 3    ASSESSMENT/Changes in Function: Patient reports little to no improvement with left hip pain since SOC. Patient continues to ambulate without AD with antalgic gait, but reports use of SPC with community ambulation. Patient reports being limited with ambulation and stair negotiation.        []  See Plan of Care  []  See progress note/recertification  [x]  See Discharge Summary         Progress towards goals / Updated goals:  Short Term Goals: To be accomplished in 1 weeks:  1.  Patient will become proficient in their HEP and will be compliant in performing that program.  Evaluation:   Patient given a written/illustrated HEP. Current: Goal Met: pt reports full compliance and demonstrates proficiency, 08/19/21     Long Term Goals: To be accomplished in 4 weeks:  1. Patient's pain level will be 2-3/10 with activity in order to improve patient's ability to perform normal ADLs. Evaluation:  2/10-8/10  Current: not met: 0/10-6/10. 9/9/21  2. Patient will demonstrate AROM Lumbar Spine with no more than 25% limitation, with no pain, to increase ease of ADLs. Evaluation:  AROM L/S flexion limited 25% with pain; extension limited 100%; Right SB WNL, Left SB 25% limited with pain; Right Rotation WNL with pain, Left Rotation WNL. Current: Progressing:  AROM L/S flexion WNL with pain, extension limited 25%, left SB WNL pain, right SB WNL, B rotation WNL no pain. 9/2/21  3. Patient will increase FOTO score to 55 to indicate increased functional mobility. Evaluation:  39  Current: Progressing: FOTO =44. 9/9/21  4. Patient will tolerate walking for 10 minutes in order to increase his functional activity level. Evaluation:  Walking more than 2 minutes increases his pain. Current: progressing: walking tolerance 3 mins. 9/9/21       PLAN  []  Upgrade activities as tolerated     []  Continue plan of care  []  Update interventions per flow sheet       [x]  Discharge due to:_lack of progress toward goals.    []  Other:_      Victorino Burnett, VIOLETTE 9/9/2021  2:45 PM    Future Appointments   Date Time Provider Gonzalo Huerta   [facility]10/2021  [facility]   4/26/2022  1:00 PM Maicol Montes MD [facility] BS AMB

## 2021-09-09 NOTE — PROGRESS NOTES
Naren Squires.  1945   Chief Complaint   Patient presents with    Hip Pain     left follow up    Leg Pain     left follow up        Whitley Mosher. is a 67 y.o. male who presents today for evaluation of left leg pain and buttock pain. He has been in PT. States his pain is no better. he notes therapy is not making a difference. Has pain in his buttock that radiates down his left leg. Has numbness and tingling. pain is a 2/10. Patient denies any fever, chills, chest pain, shortness of breath or calf pain. The remainder of the review of systems is negative. There are no new illness or injuries to report since last seen in the office. No changes in medications, allergies, social or family history. PHYSICAL EXAM:   Visit Vitals  Pulse 71   Temp 97.4 °F (36.3 °C) (Skin)   Ht 5' 11\" (1.803 m)   Wt 237 lb 8 oz (107.7 kg)   SpO2 97%   BMI 33.12 kg/m²     The patient is a well-developed, well-nourished male   in no acute distress. The patient is alert and oriented times three. The patient is alert and oriented times three. Mood and affect are normal.  LYMPHATIC: lymph nodes are not enlarged and are within normal limits  SKIN: normal in color and non tender to palpation. There are no bruises or abrasions noted. NEUROLOGICAL: Motor sensory exam is within normal limits. Reflexes are equal bilaterally. There is normal sensation to pinprick and light touch  MUSCULOSKELETAL:  Examination Lumbar   Skin Intact   Tenderness, Paraspinal muscles +   Paraspinal muscle spasms +   Flexion Fingertips to ankle   Extension 10   Knee reflexes Normal   Ankle reflexes Normal   Straight leg raise -   Calf tenderness -         IMAGIN view x-rays of lumbar spine taken in the office on 8/10/2021 read and reviewed by myself reveal degenerative arthritis with decreased joint space and L3-L4 and L5. IMPRESSION:      ICD-10-CM ICD-9-CM    1.  Chronic bilateral low back pain with bilateral sciatica  M54.42 724.2 REFERRAL TO SPINE SURGERY    M54.41 724.3 MRI LUMB SPINE WO CONT    G89.29 338.29         PLAN:   1. Patient with radicular pain left lower extremity consistent with lumbar spondylosis. Secondary to no improvement we will order  A STAT MRI r/o HNP. Will have him f/u with spine following MRI. Will cont with gabapentin at night . Hold on PT for now. Risk factors include: Advanced age  3. No cortisone injection indicated today   3. No Physical/Occupational Therapy indicated today  4. YES diagnostic test indicated today:   5. No durable medical equipment indicated today  6. No referral indicated today   7. Yes medications indicated today:   8.  No Narcotic indicated today  RTC with [address] Ruano, [facility] [place] [address] and Spine Specialist

## 2021-09-09 NOTE — PROGRESS NOTES
Physical Therapy Discharge Instructions      [facility]   [address] [place], [address]  [phone] fax    Patient: Samia Lance.   : 1945      Continue Home Exercise Program 1 times per day for 4 weeks, then decrease to 3 times per week      Continue with    [x] Ice  as needed      [] Heat           Follow up with MD:     [] Upon completion of therapy     [x] As needed      Recommendations:     [x]   Return to activity with home program    []   Return to activity with the following modifications:       []Post Rehab Program    []Join Independent aquatic program     []Return to/join local gym        Victorino Burnett PTA 2021 3:17 PM

## 2021-09-10 ENCOUNTER — OFFICE VISIT (OUTPATIENT)
Dept: ORTHOPEDIC SURGERY | Age: 76
End: 2021-09-10
Payer: MEDICARE

## 2021-09-10 VITALS
HEIGHT: 71 IN | TEMPERATURE: 97.4 F | OXYGEN SATURATION: 97 % | WEIGHT: 237.5 LBS | BODY MASS INDEX: 33.25 KG/M2 | HEART RATE: 71 BPM

## 2021-09-10 DIAGNOSIS — M54.50 CHRONIC LEFT-SIDED LOW BACK PAIN WITHOUT SCIATICA: ICD-10-CM

## 2021-09-10 DIAGNOSIS — M54.41 CHRONIC BILATERAL LOW BACK PAIN WITH BILATERAL SCIATICA: Primary | ICD-10-CM

## 2021-09-10 DIAGNOSIS — G89.29 CHRONIC LEFT-SIDED LOW BACK PAIN WITHOUT SCIATICA: ICD-10-CM

## 2021-09-10 DIAGNOSIS — G89.29 CHRONIC BILATERAL LOW BACK PAIN WITH BILATERAL SCIATICA: Primary | ICD-10-CM

## 2021-09-10 DIAGNOSIS — M54.42 CHRONIC BILATERAL LOW BACK PAIN WITH BILATERAL SCIATICA: Primary | ICD-10-CM

## 2021-09-10 PROCEDURE — 99214 OFFICE O/P EST MOD 30 MIN: CPT | Performed by: PHYSICIAN ASSISTANT

## 2021-09-10 PROCEDURE — G8417 CALC BMI ABV UP PARAM F/U: HCPCS | Performed by: PHYSICIAN ASSISTANT

## 2021-09-10 PROCEDURE — G8427 DOCREV CUR MEDS BY ELIG CLIN: HCPCS | Performed by: PHYSICIAN ASSISTANT

## 2021-09-10 PROCEDURE — G8536 NO DOC ELDER MAL SCRN: HCPCS | Performed by: PHYSICIAN ASSISTANT

## 2021-09-10 PROCEDURE — G8432 DEP SCR NOT DOC, RNG: HCPCS | Performed by: PHYSICIAN ASSISTANT

## 2021-09-10 PROCEDURE — 1101F PT FALLS ASSESS-DOCD LE1/YR: CPT | Performed by: PHYSICIAN ASSISTANT

## 2021-09-10 RX ORDER — GABAPENTIN 300 MG/1
300 CAPSULE ORAL
Qty: 30 CAPSULE | Refills: 2 | Status: SHIPPED | OUTPATIENT
Start: 2021-09-10

## 2021-09-13 NOTE — PROGRESS NOTES
[facility]              [address]        [place], [address]   [phone] fax    Discharge Summary  Patient name: Baljit Ley Start of Care: 2021   Referral source: Samina Randle, [address] : 1945   Medical/Treatment Diagnosis: Low back pain [M54.5]  Pain in left leg [M79.605]  Payor: Matheus Dayton / Plan: Trang Bullock / Product Type: Managed Care Medicare /  Onset Date:2021     Prior Hospitalization: see medical history Provider#: [id number]   Medications: Verified on Patient Summary List    Comorbidities: Arthritis, Back Pain, BMI 32.1, Diabetes, GI Disease, previous falls, prior surgeries, Sleep dysfunction. Prior Level of Function: Independent activity, doing yard work, some electrical work.  Active on his boat 3x per week. Visits from Start of Care: 5    Missed Visits: 0  Reporting Period : 2021 to 2021    Summary of Care:  Short Term Goals: To be accomplished in 1 weeks:  1.  Patient will become proficient in their HEP and will be compliant in performing that program.  Evaluation:   Patient given a written/illustrated HEP. Current: Goal Met: pt reports full compliance and demonstrates proficiency, 21     Long Term Goals: To be accomplished in 4 weeks:  1. Patient's pain level will be 2-3/10 with activity in order to improve patient's ability to perform normal ADLs. Evaluation:  2/10-8/10  Current:  0/10-6/10. 21  Goal Not Met.    2. Patient will demonstrate AROM Lumbar Spine with no more than 25% limitation, with no pain, to increase ease of ADLs. Evaluation:  AROM L/S flexion limited 25% with pain; extension limited 100%; Right SB WNL, Left SB 25% limited with pain; Right Rotation WNL with pain, Left Rotation WNL. Current:   AROM L/S flexion WNL with pain, extension limited 25%, left SB WNL pain, right SB WNL, B rotation WNL no pain. 21  Progressing, not met.   3. Patient will increase FOTO score to 55 to indicate increased functional mobility. Evaluation:  39  Current:  FOTO =44. 9/9/21  Progressing, not met. 4. Patient will tolerate walking for 10 minutes in order to increase his functional activity level. Evaluation:  Walking more than 2 minutes increases his pain. Current:  walking tolerance 3 mins. 9/9/21  No significant change noted. ASSESSMENT/RECOMMENDATIONS: Patient subjectively notes little to no improvement in his left hip pain since start of care. He continues with an antalgic gait, uses a SPC for community ambulation but not at home. He notes limitation on stairs and with ambulation.     [x]Discontinue therapy: []Patient has reached or is progressing toward set goals      []Patient is non-compliant or has abdicated      [x]Due to lack of appreciable progress towards set goals    Luverne Kayser, PT 9/13/2021 8:03 AM

## 2021-09-22 ENCOUNTER — HOSPITAL ENCOUNTER (OUTPATIENT)
Age: 76
Discharge: HOME | End: 2021-09-22
Attending: PHYSICIAN ASSISTANT
Payer: MEDICARE

## 2021-09-22 DIAGNOSIS — M54.42 CHRONIC BILATERAL LOW BACK PAIN WITH BILATERAL SCIATICA: ICD-10-CM

## 2021-09-22 DIAGNOSIS — M54.41 CHRONIC BILATERAL LOW BACK PAIN WITH BILATERAL SCIATICA: ICD-10-CM

## 2021-09-22 DIAGNOSIS — G89.29 CHRONIC BILATERAL LOW BACK PAIN WITH BILATERAL SCIATICA: ICD-10-CM

## 2021-09-22 PROCEDURE — 72148 MRI LUMBAR SPINE W/O DYE: CPT

## 2022-01-10 ENCOUNTER — HOSPITAL ENCOUNTER (OUTPATIENT)
Dept: PHYSICAL THERAPY | Age: 77
Discharge: HOME | End: 2022-01-10
Payer: MEDICARE

## 2022-01-10 PROCEDURE — 97112 NEUROMUSCULAR REEDUCATION: CPT | Performed by: PHYSICAL THERAPIST

## 2022-01-10 PROCEDURE — 97162 PT EVAL MOD COMPLEX 30 MIN: CPT | Performed by: PHYSICAL THERAPIST

## 2022-01-10 NOTE — PROGRESS NOTES
[facility] - [facility]              [address]        [place], [address] [place]   [phone] fax    Plan of Care/ Statement of Necessity for Physical Therapy Services    Patient name: Liv Conroy Start of Care: 1/10/2022   Referral source: Rona Izquierdoer, * : 1945    Medical Diagnosis: Other low back pain [M54.59]  Payor: Kae Johnson / Plan: Alicia New / Product Type: Managed Care Medicare /  Onset Date:2021    Treatment Diagnosis: Low Back Pain   Prior Hospitalization: see medical history Provider#: [id number]   Medications: Verified on Patient summary List    Comorbidities: Arthritis, Back Pain, BMI 31.4, COPD/Emphysema,  Diabetes, Previous accidents, prior surgery, Sleep dysfunction. Prior Level of Function: Hunt and fish, did some electrical work on the side. The Plan of Care and following information is based on the information from the initial evaluation. Assessment/ key information: Patient with signs and symptoms consistent with lower back pain. Patient underwent back surgery 2021. He now presents with c/o left sided lower back pain. He notes difficulty with sleeping and has pain in right and left side lying. Walking is a problem for this patient and he is using a SPC for assistance. Functionally, walking is limited and he has been unable to walk in the woods or go fishing, he is sleeping most of the time in a recliner. He requires moderate assist of 1 with supine to sit transfers. Patient will benefit from a program of skilled physical therapy to include therapeutic exercises to address strength deficits, therapeutic activities to improve functional mobility, neuromuscular reeducation to address balance, coordination and proprioception, manual therapy to address ROM and tissue extensibility and modalities as indicated. All questions were answered.     Evaluation Complexity History HIGH Complexity :3+ comorbidities / personal factors will impact the outcome/ POC ; Examination MEDIUM Complexity : 3 Standardized tests and measures addressing body structure, function, activity limitation and / or participation in recreation  ;Presentation MEDIUM Complexity : Evolving with changing characteristics  ; Clinical Decision Making MEDIUM Complexity : FOTO score of 26-74  Overall Complexity Rating: MEDIUM  Problem List: pain affecting function, decrease ROM, decrease strength, impaired gait/ balance, decrease ADL/ functional abilitiies, decrease activity tolerance, decrease flexibility/ joint mobility and decrease transfer abilities   Treatment Plan may include any combination of the following: Therapeutic exercise, Therapeutic activities, Neuromuscular re-education, Physical agent/modality, Gait/balance training and Manual therapy  Patient / Family readiness to learn indicated by: asking questions, trying to perform skills and interest  Persons(s) to be included in education: patient (P)  Barriers to Learning/Limitations: None  Patient Goal (s): Be able to walk and be able to sleep  Patient Self Reported Health Status: fair  Rehabilitation Potential: good    Short Term Goals: To be accomplished in 1-2 treatments:  1. Patient will become proficient in their HEP and will be compliant in performing that program.  Evaluation:   Patient given a written/illustrated HEP. Long Term Goals: To be accomplished in 10  treatments:  1. Patient's pain level will be 1-4/10 with activity in order to improve patient's ability to perform normal ADLs. Evaluation:  10/1-9/10  2. Patient will demonstrate AROM Lumbar Spine WFL with minimal pain to increase ease of ADLs. Evaluation:  AROM L/S flexion limited 25%, extension limited 100%, Right SB limited 25%, Left SB limited 50%, Right and Left Rotation limited 25%. Patient notes pain with extension, SB and right rotation.   3. Patient will increase FOTO score to 50 to indicate increased functional mobility. Evaluation:  42  4. Patient will report moderate to little difficulty with his usual work around the house in order to increase his functional activity level. Evaluation:  Note extreme difficulty with his usual work. Frequency / Duration: Patient to be seen 2-3 times per week for 10 treatments. Patient/ Caregiver education and instruction: Diagnosis, prognosis, exercises   [x]  Plan of care has been reviewed with PTA      Certification Period: 1/10/2022 - 2/8/2022  Rody Murphy, PT 1/10/2022 11:16 AM  ________________________________________________________________________    I certify that the above Therapy Services are being furnished while the patient is under my care. I agree with the treatment plan and certify that this therapy is necessary.     [de-identified] Signature:____________Date:_________TIME:________     Nadia Angela, *  ** Signature, Date and Time must be completed for valid certification **  Please sign and return to [facility] - [address], [address] [place]   [phone] fax

## 2022-01-10 NOTE — PROGRESS NOTES
PT DAILY TREATMENT NOTE/LUMBAR EVAL     Patient Name: Aimee Das. Date:1/10/2022  : 1945  [x]  Patient  Verified  Payor: Edith Clementsabrinasouth / Plan: [employer] / Product Type: Managed Care Medicare /    In time:11:50  Out time: 12:35  Total Treatment Time (min): 45  Visit #: 1 of 10    Medicare/BCBS Only   Total Timed Codes (min):  25 1:1 Treatment Time:  45     Treatment Area: Other low back pain [M54.59]  SUBJECTIVE  Pain Level (0-10 scale): 3-4/10 now; 1-2/10 at best when sitting; 9/10 at worst.  [x]constant []intermittent [x]improving []worsening []no change since onset    Any medication changes, allergies to medications, adverse drug reactions, diagnosis change, or new procedure performed?: [x] No    [] Yes (see summary sheet for update)  Subjective functional status/changes:     PLOF: Hunt and fish, did some electrical work on the side. Limitations to PLOF: Not able to walk in the woods to hunt. Not tried fishing. Limited walking. Mechanism of Injury: Spinal stenosis and bone spurs. Surgery in November. Current symptoms/Complaints: Patient reports constant back pain. Pain is from the center of his back to the left. He is unable to do much walking. He tried a recumbent bike for 15 minutes and this caused increased pain and he was unable to sleep well. Has to sleep on his back. Most of the time in a recliner. Wakes up 3 times per night. Previous Treatment/Compliance: None  PMHx/Surgical Hx: 2021 to remove scar tissue and bone spurs. Had prior back surgery in . Work Hx: Retired . Pt Goals: \"Be able to walk and be able to sleep\"  Barriers: []pain []financial []time []transportation []other  Cognition: A & O x 3    Other:    OBJECTIVE/EXAMINATION  Domestic Life: Lives with his wife. Activity/Recreational Limitations: Limited walking, limited activity. Mobility: ambulates with [facility]  Self Care: Independent.     20 min [x]Eval []Re-Eval       25 min Neuromuscular Re-education:  []  See flow sheet :   Rationale: increase strength, improve coordination and increase proprioception  to improve the patients ability to increase core strength to improve lumbar stability. With   [] TE   [] TA   [] neuro   [] other: Patient Education: [x] Review HEP    [] Progressed/Changed HEP based on:   [] positioning   [] body mechanics   [] transfers   [] heat/ice application    [] other:      Other Objective/Functional Measures:     Physical Therapy Evaluation - Lumbar Spine ([facility])    SUBJECTIVE  Symptoms:  Aggravated by:   [] Bending [] Sitting [x] Standing [x] Walking   [] Moving [] Cough [] Sneeze [] Valsalva   [] AM  [] PM  Lying:  [] sup   [] pro   [x] sidelying   [] Other:     Eased by:    [] Bending [x] Sitting [] Standing [] Walking   [] Moving [] AM  [] PM  Lying: [] sup  [] pro  [] sidelying   [] Other:     Diagnostic Tests: [] Lab work [x] X-rays    [] CT [x] MRI     [] Other:  Results: done prior to surgery. OBJECTIVE  Posture:  Lateral Shift: [] R    [] L     [] +  [x] -  Kyphosis: [x] Increased [] Decreased   []  WNL  Lordosis:  [] Increased [x] Decreased   [] WNL  Pelvic symmetry: [] WNL    [] Other: Forward flexed posture . Gait:  [] Normal     [x] Abnormal: Antalgic gait, SPC in his right hand. Active Movements: [] N/A   [] Too acute   [] Other:  ROM % AROM limitation  Comments:pain, area   Forward flexion 40-60 25%  (-) pain   Extension 20-30 100%  (+) pain   SB right 20-30 25%  (+) pain   SB left 20-30 50%  (+) pain   Rotation right 5-10 25%  3/10 pain left lower back   Rotation left 5-10 25%     Loss of balance noted with AROM.       Strength   L(0-5) R (0-5) N/T   Hip Flexion (L1,2) 4 4 []   Knee Extension (L3,4) 4+ 4+ []   Ankle Dorsiflexion (L4) 4+ 4+ []   Great Toe Extension (L5)   [x]   Ankle Plantarflexion (S1) 4+ 4+ []   Knee Flexion (S1,2) 4+ 4+ []   Hip Extension (S1,2) 2 2 []   Hip Abduction (L5) 2 2 [] []      []      []   Abdominals 2  []     Special Tests  Lumbar:  Iliolumbar Lig Test: [] R    [x] L   [x] Pos  [] Neg               Leg Length: [] +    [x] -   Position: Supine    Crests: level in standing    Mobility: Standing flex: (-)    Piriformis: [] R    [x] L    [x] +    [] -     Other tests/comments:  Sit to Supine: Independent with moderate difficulty. Also has difficulty with supine to side lying. Dural Mobility:  SLR Sitting: [x] R    [x] L    [] +    [x] -  @ (degrees):           Supine: [] R    [x] L    [x] +    [] -  @ (40 degrees):   Prone Knee Bend: [x] R    [] L    [x] +    [] -     Palpation  [] Min  [x] Mod  [x] Severe    Location: Left PSIS    Pain Level (0-10 scale) post treatment: 3-4    ASSESSMENT/Changes in Function: Patient with signs and symptoms consistent with lower back pain. Patient underwent back surgery 11/5/2021. He now presents with c/o left sided lower back pain. He notes difficulty with sleeping and has pain in right and left side lying. Walking is a problem for this patient and he is using a SPC for assistance. Functionally, walking is limited and he has been unable to walk in the woods or go fishing, he is sleeping most of the time in a recliner. He requires moderate assist of 1 with supine to sit transfers. Patient will continue to benefit from skilled PT services to modify and progress therapeutic interventions, address functional mobility deficits, address ROM deficits, address strength deficits, analyze and address soft tissue restrictions, analyze and cue movement patterns, analyze and modify body mechanics/ergonomics and assess and modify postural abnormalities to attain remaining goals. [x]  See Plan of Care  []  See progress note/recertification  []  See Discharge Summary         Progress towards goals / Updated goals:  Short Term Goals: To be accomplished in 1-2 treatments:  1.   Patient will become proficient in their HEP and will be compliant in performing that program.  Evaluation:   Patient given a written/illustrated HEP.     Long Term Goals: To be accomplished in 10  treatments:  1. Patient's pain level will be 1-4/10 with activity in order to improve patient's ability to perform normal ADLs. Evaluation:  10/1-9/10  2. Patient will demonstrate AROM Lumbar Spine WFL with minimal pain to increase ease of ADLs. Evaluation:  AROM L/S flexion limited 25%, extension limited 100%, Right SB limited 25%, Left SB limited 50%, Right and Left Rotation limited 25%. Patient notes pain with extension, SB and right rotation. 3. Patient will increase FOTO score to 50 to indicate increased functional mobility. Evaluation:  42  4. Patient will report moderate to little difficulty with his usual work around the house in order to increase his functional activity level.   Evaluation:  Note extreme difficulty with his usual work.     PLAN  [x]  Upgrade activities as tolerated     [x]  Continue plan of care  []  Update interventions per flow sheet       []  Discharge due to:_  []  Other:_      Aminta Foote, PT 1/10/2022  11:19 AM

## 2022-01-11 ENCOUNTER — HOSPITAL ENCOUNTER (OUTPATIENT)
Dept: PHYSICAL THERAPY | Age: 77
Discharge: HOME | End: 2022-01-11
Payer: MEDICARE

## 2022-01-11 PROCEDURE — 97110 THERAPEUTIC EXERCISES: CPT | Performed by: PHYSICAL THERAPIST

## 2022-01-11 PROCEDURE — 97112 NEUROMUSCULAR REEDUCATION: CPT | Performed by: PHYSICAL THERAPIST

## 2022-01-11 NOTE — PROGRESS NOTES
PT DAILY TREATMENT NOTE     Patient Name: Aimee Das. Date:2022  : 1945  [x]  Patient  Verified  Payor: Edith Nicholson / Plan: Belén Henriquez / Product Type: Managed Care Medicare /    In time:11:45  Out time:12:27  Total Treatment Time (min): 42  Visit #: 2 of 10    Medicare/BCBS Only   Total Timed Codes (min):  42 1:1 Treatment Time:  42       Treatment Area: Other low back pain [M54.59]    SUBJECTIVE  Pain Level (0-10 scale): 2-3  Any medication changes, allergies to medications, adverse drug reactions, diagnosis change, or new procedure performed?: [x] No    [] Yes (see summary sheet for update)  Subjective functional status/changes:   [] No changes reported  Patient reports he rode his stationary bike for 5 minutes and he tolerated it well. He notes he did his HEP last evening. OBJECTIVE    15 min Therapeutic Exercise:  [] See flow sheet :Including manual stretching HS and KTC. Rationale: increase ROM and increase strength to improve the patients ability to increase his functional activity level. 27 min Neuromuscular Re-education:  []  See flow sheet :   Rationale: increase strength, improve coordination and increase proprioception  to improve the patients ability to increase core strength to improve lumbar stability. With   [] TE   [] TA   [] neuro   [] other: Patient Education: [x] Review HEP    [] Progressed/Changed HEP based on:   [] positioning   [] body mechanics   [] transfers   [] heat/ice application    [] other:      Other Objective/Functional Measures: Gait is with antalgia, he uses a SPC for assistance. Added sciatic nerve flossing in sitting. Pain Level (0-10 scale) post treatment: 2-3    ASSESSMENT/Changes in Function: Patient continues with some lower back pain. Notes that this owrkout today did not increase his pain, tolerated all well.     Patient will continue to benefit from skilled PT services to modify and progress therapeutic interventions, address functional mobility deficits, address ROM deficits, address strength deficits, analyze and address soft tissue restrictions, analyze and cue movement patterns, analyze and modify body mechanics/ergonomics and assess and modify postural abnormalities to attain remaining goals. [x]  See Plan of Care  []  See progress note/recertification  []  See Discharge Summary         Progress towards goals / Updated goals:  Short Term Goals: To be accomplished in 1-2 treatments:  1.  Patient will become proficient in their HEP and will be compliant in performing that program.  Evaluation:   Patient given a written/illustrated HEP. Current:  Initiated his HEP yesterday. 1/11/2022. Goal Met.     Long Term Goals: To be accomplished in 10  treatments:  1. Patient's pain level will be 1-4/10 with activity in order to improve patient's ability to perform normal ADLs. Evaluation:  10/1-9/10  2. Patient will demonstrate AROM Lumbar Spine WFL with minimal pain to increase ease of ADLs. Evaluation:  AROM L/S flexion limited 25%, extension limited 100%, Right SB limited 25%, Left SB limited 50%, Right and Left Rotation limited 25%.  Patient notes pain with extension, SB and right rotation. 3. Patient will increase FOTO score to 50 to indicate increased functional mobility. Evaluation:  42  4. Patient will report moderate to little difficulty with his usual work around the house in order to increase his functional activity level. Evaluation:  Note extreme difficulty with his usual work.     PLAN  [x]  Upgrade activities as tolerated     [x]  Continue plan of care  []  Update interventions per flow sheet       []  Discharge due to:_  []  Other:_      Kianna Barney PT 1/11/2022  11:51 AM    Future Appointments   Date Time Provider Gonzalo Huerta   1/13/2022 11:00 AM Osbaldo Farrell PTA [facility]   1/17/2022 11:45 AM JOHN Christina [facility]   1/19/2022  8:45 AM Sushma Ivy PT [facility]   1/21/2022 10:15 AM Mayorga Pod, PT [facility]   1/24/2022 11:00 AM Norman Srivastava, PT [facility]   1/26/2022 10:15 AM Mukesh Pod, PT [facility]   1/28/2022  9:30 AM Mayorga Pod, PT [facility]   4/26/2022  1:00 PM Jerome Lu MD [facility]

## 2022-01-13 ENCOUNTER — APPOINTMENT (OUTPATIENT)
Dept: PHYSICAL THERAPY | Age: 77
End: 2022-01-13
Payer: MEDICARE

## 2022-01-14 ENCOUNTER — HOSPITAL ENCOUNTER (OUTPATIENT)
Dept: PHYSICAL THERAPY | Age: 77
End: 2022-01-14

## 2022-01-17 ENCOUNTER — HOSPITAL ENCOUNTER (OUTPATIENT)
Dept: PHYSICAL THERAPY | Age: 77
Discharge: HOME | End: 2022-01-17
Payer: MEDICARE

## 2022-01-17 PROCEDURE — 97110 THERAPEUTIC EXERCISES: CPT | Performed by: PHYSICAL THERAPIST

## 2022-01-17 PROCEDURE — 97112 NEUROMUSCULAR REEDUCATION: CPT | Performed by: PHYSICAL THERAPIST

## 2022-01-17 NOTE — PROGRESS NOTES
PT DAILY TREATMENT NOTE     Patient Name: Aria Day. Date:2022  : 1945  [x]  Patient  Verified  Payor: Susan Moura / Plan: Leatha Louis / Product Type: Managed Care Medicare /    In time:11:45  Out time:12:26  Total Treatment Time (min): 41  Visit #: 3 of 10    Medicare/BCBS Only   Total Timed Codes (min):  41 1:1 Treatment Time:  41       Treatment Area: Other low back pain [M54.59]    SUBJECTIVE  Pain Level (0-10 scale): 3  Any medication changes, allergies to medications, adverse drug reactions, diagnosis change, or new procedure performed?: [x] No    [] Yes (see summary sheet for update)  Subjective functional status/changes:   [] No changes reported  Patient continues to report no change in his symptoms and that he has the same pain in the same place as prior to his surgery. States it \"has not helped at all\". OBJECTIVE    14 min Therapeutic Exercise:  []? See flow sheet :Including manual stretching HS and KTC. Rationale: increase ROM and increase strength to improve the patients ability to increase his functional activity level.        27 min Neuromuscular Re-education:  []? See flow sheet :   Rationale: increase strength, improve coordination and increase proprioception  to improve the patients ability to increase core strength to improve lumbar stability. With   [] TE   [] TA   [] neuro   [] other: Patient Education: [x] Review HEP    [] Progressed/Changed HEP based on:   [] positioning   [] body mechanics   [] transfers   [] heat/ice application    [] other:      Other Objective/Functional Measures: Patient ambulating today without his cane, left it in his truck. He has an antalgic gait with increased right lateral lean. Pain Level (0-10 scale) post treatment: 1-2    ASSESSMENT/Changes in Function: Patient continues with c/o chronic lower back pain. Subjective c/o pain decreased following therapy session.     Patient will continue to benefit from skilled PT services to modify and progress therapeutic interventions, address functional mobility deficits, address ROM deficits, address strength deficits, analyze and address soft tissue restrictions, analyze and cue movement patterns, analyze and modify body mechanics/ergonomics and assess and modify postural abnormalities to attain remaining goals. [x]  See Plan of Care  []  See progress note/recertification  []  See Discharge Summary         Progress towards goals / Updated goals:  Short Term Goals: To be accomplished in 1-2 treatments:  1.  Patient will become proficient in their HEP and will be compliant in performing that program.  Evaluation:   Patient given a written/illustrated HEP. Current:  Initiated his HEP yesterday. 1/11/2022. Goal Met.     Long Term Goals: To be accomplished in 10  treatments:  1. Patient's pain level will be 1-4/10 with activity in order to improve patient's ability to perform normal ADLs. Evaluation:  1/10-9/10  Current:  2/10-6/10.  1/17/2022. Progressing. 2. Patient will demonstrate AROM Lumbar Spine WFL with minimal pain to increase ease of ADLs. Evaluation:  AROM L/S flexion limited 25%, extension limited 100%, Right SB limited 25%, Left SB limited 50%, Right and Left Rotation limited 25%.  Patient notes pain with extension, SB and right rotation. 3. Patient will increase FOTO score to 50 to indicate increased functional mobility. Evaluation:  42  4. Patient will report moderate to little difficulty with his usual work around the house in order to increase his functional activity level. Evaluation:  Note extreme difficulty with his usual work.     PLAN  [x]  Upgrade activities as tolerated     [x]  Continue plan of care  []  Update interventions per flow sheet       []  Discharge due to:_  []  Other:_      Bárbara White, PT 1/17/2022  12:02 PM    Future Appointments   Date Time Provider Gonzalo Huerta   1/19/2022  8:45 AM Allison Hitchcock PT [facility]   1/21/2022 10:15 AM Jono Vazquez, PT [facility]   1/24/2022 11:00 AM Esther Angulo, PT [facility]   1/26/2022 10:15 AM Jono Vazquez, PT [facility]   1/28/2022  9:30 AM Jono Vazquez, PT [facility]   4/26/2022  1:00 PM Chel Leong MD [facility] AM[facility]

## 2022-01-19 ENCOUNTER — HOSPITAL ENCOUNTER (OUTPATIENT)
Dept: PHYSICAL THERAPY | Age: 77
Discharge: HOME | End: 2022-01-19
Payer: MEDICARE

## 2022-01-19 PROCEDURE — 97112 NEUROMUSCULAR REEDUCATION: CPT | Performed by: PHYSICAL THERAPIST

## 2022-01-19 PROCEDURE — 97110 THERAPEUTIC EXERCISES: CPT | Performed by: PHYSICAL THERAPIST

## 2022-01-19 NOTE — PROGRESS NOTES
PT DAILY TREATMENT NOTE     Patient Name: Severo Slay. Date:2022  : 1945  [x]  Patient  Verified  Payor: Jeremie Aldrich / Plan: Alfredo Jones / Product Type: Managed Care Medicare /    In time:8:45  Out time:9:25  Total Treatment Time (min): 40  Visit #: 4 of 10    Medicare/BCBS Only   Total Timed Codes (min):  40 1:1 Treatment Time:  40       Treatment Area: Other low back pain [M54.59]    SUBJECTIVE  Pain Level (0-10 scale): 1-2  Any medication changes, allergies to medications, adverse drug reactions, diagnosis change, or new procedure performed?: [x] No    [] Yes (see summary sheet for update)  Subjective functional status/changes:   [] No changes reported  Patient notes he has less pain pulling himself up into his truck. Pain remains constant. OBJECTIVE    15 min Therapeutic Exercise:  []? ? See flow sheet :Including manual stretching HS and KTC. Rationale: increase ROM and increase strength to improve the patients ability to increase his functional activity level.        25 min Neuromuscular Re-education:  []??  See flow sheet :   Rationale: increase strength, improve coordination and increase proprioception  to improve the patients ability to increase core strength to improve lumbar stability. With   [] TE   [] TA   [] neuro   [] other: Patient Education: [x] Review HEP    [] Progressed/Changed HEP based on:   [] positioning   [] body mechanics   [] transfers   [] heat/ice application    [] other:      Other Objective/Functional Measures: Patient struggles to transfer supine to sit, sometimes requiring moderate asssit. Pain Level (0-10 scale) post treatment: 1-2    ASSESSMENT/Changes in Function: Patient with subjective improvement noted getting into his truck.     Patient will continue to benefit from skilled PT services to modify and progress therapeutic interventions, address functional mobility deficits, address ROM deficits, address strength deficits, analyze and address soft tissue restrictions, analyze and cue movement patterns, analyze and modify body mechanics/ergonomics and assess and modify postural abnormalities to attain remaining goals. [x]  See Plan of Care  []  See progress note/recertification  []  See Discharge Summary         Progress towards goals / Updated goals:  Short Term Goals: To be accomplished in 1-2 treatments:  1.  Patient will become proficient in their HEP and will be compliant in performing that program.  Evaluation:   Patient given a written/illustrated HEP. Current:  Initiated his HEP yesterday.  1/11/2022.  Goal Met.     Long Term Goals: To be accomplished in 10  treatments:  1. Patient's pain level will be 1-4/10 with activity in order to improve patient's ability to perform normal ADLs. Evaluation:  1/10-9/10  Current:  2/10-6/10.  1/17/2022. Progressing. 2. Patient will demonstrate AROM Lumbar Spine WFL with minimal pain to increase ease of ADLs. Evaluation:  AROM L/S flexion limited 25%, extension limited 100%, Right SB limited 25%, Left SB limited 50%, Right and Left Rotation limited 25%.  Patient notes pain with extension, SB and right rotation. 3. Patient will increase FOTO score to 50 to indicate increased functional mobility. Evaluation:  42  4. Patient will report moderate to little difficulty with his usual work around the house in order to increase his functional activity level. Evaluation:  Note extreme difficulty with his usual work.     PLAN  [x]  Upgrade activities as tolerated     [x]  Continue plan of care  []  Update interventions per flow sheet       []  Discharge due to:_  []  Other:_      Opal Holliday, PT 1/19/2022  8:55 AM    Future Appointments   Date Time Provider Gonzalo Huerta   1/21/2022 10:15 AM Reyes Ivy PT [facility]   1/24/2022 11:00 AM Dana Christensen [facility]   1/26/2022 10:15 AM Reyes Ivy PT [facility]   1/28/2022  9:30 AM Reyes Ivy PT [facility]   4/26/2022  1:00 PM MD ANTONIA Comer AMB

## 2022-01-21 ENCOUNTER — HOSPITAL ENCOUNTER (OUTPATIENT)
Dept: PHYSICAL THERAPY | Age: 77
Discharge: HOME | End: 2022-01-21
Payer: MEDICARE

## 2022-01-21 PROCEDURE — 97110 THERAPEUTIC EXERCISES: CPT | Performed by: PHYSICAL THERAPIST

## 2022-01-21 PROCEDURE — 97112 NEUROMUSCULAR REEDUCATION: CPT | Performed by: PHYSICAL THERAPIST

## 2022-01-21 NOTE — PROGRESS NOTES
PT DAILY TREATMENT NOTE     Patient Name: Kailee Peralta. Date:2022  : 1945  [x]  Patient  Verified  Payor: Maicol Card / Plan: Colletta Conners / Product Type: Managed Care Medicare /    In time:10:14  Out time:11:00  Total Treatment Time (min): 46  Visit #: 5 of 10    Medicare/BCBS Only   Total Timed Codes (min):  46 1:1 Treatment Time:  46       Treatment Area: Other low back pain [M54.59]    SUBJECTIVE  Pain Level (0-10 scale): 1-2  Any medication changes, allergies to medications, adverse drug reactions, diagnosis change, or new procedure performed?: [x] No    [] Yes (see summary sheet for update)  Subjective functional status/changes:   [] No changes reported  At this time he feels his CC is his walking. Notes start up difficulty but ambulation improves after he walks for a little while. At home is no longer using a cane or walker. OBJECTIVE      16 min Therapeutic Exercise:  []??? See flow sheet :Including manual stretching HS and KTC. Rationale: increase ROM and increase strength to improve the patients ability to increase his functional activity level.        30 min Neuromuscular Re-education:  []???  See flow sheet :   Rationale: increase strength, improve coordination and increase proprioception  to improve the patients ability to increase core strength to improve lumbar stability. With   [] TE   [] TA   [] neuro   [] other: Patient Education: [x] Review HEP    [] Progressed/Changed HEP based on:   [] positioning   [] body mechanics   [] transfers   [] heat/ice application    [] other:      Other Objective/Functional Measures: Added balance activities in the parallel bars. See flow sheet. Pain Level (0-10 scale) post treatment: 0    ASSESSMENT/Changes in Function: Patient with balance and gait deficits, most notably with start up gait.     Patient will continue to benefit from skilled PT services to modify and progress therapeutic interventions, address functional mobility deficits, address ROM deficits, address strength deficits, analyze and address soft tissue restrictions, analyze and cue movement patterns, analyze and modify body mechanics/ergonomics and assess and modify postural abnormalities to attain remaining goals. []  See Plan of Care  []  See progress note/recertification  []  See Discharge Summary         Progress towards goals / Updated goals:  Short Term Goals: To be accomplished in 1-2 treatments:  1.  Patient will become proficient in their HEP and will be compliant in performing that program.  Evaluation:   Patient given a written/illustrated HEP. Current:  Initiated his HEP yesterday.  1/11/2022.  Goal Met.     Long Term Goals: To be accomplished in 10  treatments:  1. Patient's pain level will be 1-4/10 with activity in order to improve patient's ability to perform normal ADLs. Evaluation:  1/10-9/10  Current:  2/10-6/10.  1/17/2022.  Progressing. 2. Patient will demonstrate AROM Lumbar Spine WFL with minimal pain to increase ease of ADLs. Evaluation:  AROM L/S flexion limited 25%, extension limited 100%, Right SB limited 25%, Left SB limited 50%, Right and Left Rotation limited 25%.  Patient notes pain with extension, SB and right rotation. 3. Patient will increase FOTO score to 50 to indicate increased functional mobility. Evaluation:  42  4. Patient will report moderate to little difficulty with his usual work around the house in order to increase his functional activity level. Evaluation:  Note extreme difficulty with his usual work. Current:  Reports moderate difficulty with his usual work at home.  1/21/22. Progressing.     PLAN  []  Upgrade activities as tolerated     []  Continue plan of care  []  Update interventions per flow sheet       []  Discharge due to:_  []  Other:_      Kianna Barney, PT 1/21/2022  10:43 AM    Future Appointments   Date Time Provider Gonzalo Huerta   1/24/2022 11:00 AM Marium Vasquez [place] [facility]   1/26/2022 10:15 AM Nina Marie, PT [facility]   1/28/2022  9:30 AM Nina Marie, PT [facility]   4/26/2022  1:00 PM Sherri Andrade MD [facility] AM[facility]

## 2022-01-24 ENCOUNTER — HOSPITAL ENCOUNTER (OUTPATIENT)
Dept: PHYSICAL THERAPY | Age: 77
Discharge: HOME | End: 2022-01-24
Payer: MEDICARE

## 2022-01-24 PROCEDURE — 97112 NEUROMUSCULAR REEDUCATION: CPT

## 2022-01-24 PROCEDURE — 97110 THERAPEUTIC EXERCISES: CPT

## 2022-01-24 NOTE — PROGRESS NOTES
PT DAILY TREATMENT NOTE     Patient Name: Kailee Peralta. Date:2022  : 1945  [x]  Patient  Verified  Payor: Maicol Card / Plan: Colletta Conners / Product Type: Managed Care Medicare /    In time:1100  Out time:1143  Total Treatment Time (min): 43  Visit #: 6 of 10    Medicare/BCBS Only   Total Timed Codes (min):  43 1:1 Treatment Time:  43       Treatment Area: Other low back pain [M54.59]    SUBJECTIVE  Pain Level (0-10 scale): 4  Any medication changes, allergies to medications, adverse drug reactions, diagnosis change, or new procedure performed?: [x] No    [] Yes (see summary sheet for update)  Subjective functional status/changes:   [] No changes reported  Patient reports elevated pain levels this AM, with symptoms primarily localized in the left posterior hip, with patient attributing to having rode his stationary bike 3x yesterday for 5-10 minutes each day.      OBJECTIVE    15 min Therapeutic Exercise:  [x] See flow sheet : Emphasis placed on improving available lumbopelvic and LE AROM and strength   Rationale: increase ROM and increase strength to improve the patients ability to improve ease with self-care ADLs    28 min Neuromuscular Re-education:  [x]  See flow sheet : Emphasis placed on improving activation and recruitment of the anterior abdominal and gluteal musculature and improving lumbopelvic kinesthetic awareness   Rationale: increase ROM, increase strength, improve balance and increase proprioception  to improve the patients ability to improve safety with functional ADLs          With   [] TE   [] TA   [] neuro   [] other: Patient Education: [x] Review HEP    [] Progressed/Changed HEP based on:   [] positioning   [] body mechanics   [] transfers   [] heat/ice application    [] other:      Other Objective/Functional Measures:   Romberg (Airex) EO = 30 sec     Pain Level (0-10 scale) post treatment: 5    ASSESSMENT/Changes in Function: Patient demonstrates a prominent left antalgic gait pattern with ambulation with SPC with relative set back demonstrated secondary to increased independent activity level. Without AD patient observationally noted to demonstrate a prominent antalgic gait pattern with contralateral lateral trunk shift during left stance phase, wide base of support and poor dynamic stability. Patient will continue to benefit from skilled PT services to modify and progress therapeutic interventions, address functional mobility deficits, address ROM deficits, address strength deficits, analyze and address soft tissue restrictions, analyze and cue movement patterns, analyze and modify body mechanics/ergonomics, assess and modify postural abnormalities, address imbalance/dizziness and instruct in home and community integration to attain remaining goals. []  See Plan of Care  []  See progress note/recertification  []  See Discharge Summary         Progress towards goals / Updated goals:    Short Term Goals: To be accomplished in 1-2 treatments:  1.  Patient will become proficient in their HEP and will be compliant in performing that program.  Evaluation:   Patient given a written/illustrated HEP. Current: Met, Initiated his HEP yesterday.  1/11/2022     Long Term Goals: To be accomplished in 10  treatments:  1. Patient's pain level will be 1-4/10 with activity in order to improve patient's ability to perform normal ADLs. Evaluation:  1/10-9/10  Current:  Progressing, Pain Level = 0/10 - 7/10, 1/24/2022  2. Patient will demonstrate AROM Lumbar Spine WFL with minimal pain to increase ease of ADLs. Evaluation:  AROM L/S flexion limited 25%, extension limited 100%, Right SB limited 25%, Left SB limited 50%, Right and Left Rotation limited 25%.  Patient notes pain with extension, SB and right rotation. 3. Patient will increase FOTO score to 50 to indicate increased functional mobility. Evaluation: FOTO = 42  4.  Patient will report moderate to little difficulty with his usual work around the house in order to increase his functional activity level. Evaluation:  Note extreme difficulty with his usual work.   Current: Progressing, Reports moderate difficulty with his usual work at home.  1/21/22    PLAN  [x]  Upgrade activities as tolerated     [x]  Continue plan of care  []  Update interventions per flow sheet       []  Discharge due to:_  []  Other:_      Alvaro Spangler, PT 1/24/2022  10:25 AM    Future Appointments   Date Time Provider Gonzalo Huerta   1/28/2022  9:30 AM Demetrius Pena, PT [facility]   4/26/2022  1:00 PM Anton Fothergill, MD [facility]

## 2022-01-26 ENCOUNTER — APPOINTMENT (OUTPATIENT)
Dept: PHYSICAL THERAPY | Age: 77
End: 2022-01-26
Payer: MEDICARE

## 2022-01-28 ENCOUNTER — HOSPITAL ENCOUNTER (OUTPATIENT)
Dept: PHYSICAL THERAPY | Age: 77
Discharge: HOME | End: 2022-01-28
Payer: MEDICARE

## 2022-01-28 PROCEDURE — 97112 NEUROMUSCULAR REEDUCATION: CPT | Performed by: PHYSICAL THERAPIST

## 2022-01-28 PROCEDURE — 97110 THERAPEUTIC EXERCISES: CPT | Performed by: PHYSICAL THERAPIST

## 2022-01-28 NOTE — PROGRESS NOTES
PT DAILY TREATMENT NOTE     Patient Name: Luis F Turcios. Date:2022  : 1945  [x]  Patient  Verified  Payor: Tessa Quinn / Plan: [employer]  / Product Type: Managed Care Medicare /    In time:9:29  Out time:10:18  Total Treatment Time (min): 49  Visit #: 7 of 10    Medicare/BCBS Only   Total Timed Codes (min):  49 1:1 Treatment Time:  43       Treatment Area: Other low back pain [M54.59]    SUBJECTIVE  Pain Level (0-10 scale): 2-3  Any medication changes, allergies to medications, adverse drug reactions, diagnosis change, or new procedure performed?: [x] No    [] Yes (see summary sheet for update)  Subjective functional status/changes:   [] No changes reported  Patient continues to note lower back discomfort. States overall he notes he is slowly improving. OBJECTIVE      19 min Therapeutic Exercise:  [x]? See flow sheet : Emphasis placed on improving available lumbopelvic and LE AROM and strength   Rationale: increase ROM and increase strength to improve the patients ability to improve ease with self-care ADLs     30 min Neuromuscular Re-education:  [x]? See flow sheet : Emphasis placed on improving activation and recruitment of the anterior abdominal and gluteal musculature and improving lumbopelvic kinesthetic awareness   Rationale: increase ROM, increase strength, improve balance and increase proprioception  to improve the patients ability to improve safety with functional ADLs            With   [] TE   [] TA   [] neuro   [] other: Patient Education: [x] Review HEP    [] Progressed/Changed HEP based on:   [] positioning   [] body mechanics   [] transfers   [] heat/ice application    [] other:      Other Objective/Functional Measures: Gait is with increased lateral lean. Dynamic balance is fair with changing direction causing him to falter and adjust speed.      Pain Level (0-10 scale) post treatment: 2    ASSESSMENT/Changes in Function: Patient with continued lower back discomfort. Patient will continue to benefit from skilled PT services to modify and progress therapeutic interventions, address functional mobility deficits, address ROM deficits, address strength deficits, analyze and address soft tissue restrictions, analyze and cue movement patterns, analyze and modify body mechanics/ergonomics, assess and modify postural abnormalities, address imbalance/dizziness and instruct in home and community integration to attain remaining goals. [x]  See Plan of Care  []  See progress note/recertification  []  See Discharge Summary         Progress towards goals / Updated goals:  Short Term Goals: To be accomplished in 1-2 treatments:  1.  Patient will become proficient in their HEP and will be compliant in performing that program.  Evaluation:   Patient given a written/illustrated HEP. Current: Met, Initiated his HEP yesterday.  1/11/2022     Long Term Goals: To be accomplished in 10  treatments:  1. Patient's pain level will be 1-4/10 with activity in order to improve patient's ability to perform normal ADLs. Evaluation:  1/10-9/10  Current:  Progressing, Pain Level = 0/10 - 7/10, 1/24/2022  2. Patient will demonstrate AROM Lumbar Spine WFL with minimal pain to increase ease of ADLs. Evaluation:  AROM L/S flexion limited 25%, extension limited 100%, Right SB limited 25%, Left SB limited 50%, Right and Left Rotation limited 25%.  Patient notes pain with extension, SB and right rotation. 3. Patient will increase FOTO score to 50 to indicate increased functional mobility. Evaluation: FOTO = 42  4. Patient will report moderate to little difficulty with his usual work around the house in order to increase his functional activity level. Evaluation:  Note extreme difficulty with his usual work.     PLAN  [x]  Upgrade activities as tolerated     [x]  Continue plan of care  []  Update interventions per flow sheet       []  Discharge due to:_  []  Other:_      Reji Moses PT 1/28/2022  12:45 PM    Future Appointments   Date Time Provider Gonzalo Huerta   1/31/2022  1:15 PM Demetrius Pena, PT [facility]   2/2/2022 11:00 AM Estrella Bullock DPT [facility]   2/4/2022  2:00 PM Demetrius Pena, PT [facility]   2/7/2022  1:15 PM Demetrius Pena, PT [facility]   2/9/2022  2:00 PM Dana Zavala [facility]   4/26/2022  1:00 PM Anton Fothergill, MD [facility]

## 2022-01-31 ENCOUNTER — HOSPITAL ENCOUNTER (OUTPATIENT)
Dept: PHYSICAL THERAPY | Age: 77
Discharge: HOME | End: 2022-01-31
Payer: MEDICARE

## 2022-01-31 PROCEDURE — 97110 THERAPEUTIC EXERCISES: CPT | Performed by: PHYSICAL THERAPIST

## 2022-01-31 PROCEDURE — 97112 NEUROMUSCULAR REEDUCATION: CPT | Performed by: PHYSICAL THERAPIST

## 2022-01-31 NOTE — PROGRESS NOTES
PT DAILY TREATMENT NOTE     Patient Name: Jose Alfredo Rivero. Date:2022  : 1945  [x]  Patient  Verified  Payor: Ginger Yuan / Plan: [employer]  / Product Type: Managed Care Medicare /    In time:1:15  Out time:2:00  Total Treatment Time (min): 45  Visit #: 8 of 10    Medicare/BCBS Only   Total Timed Codes (min):  45 1:1 Treatment Time:  42       Treatment Area: Other low back pain [M54.59]    SUBJECTIVE  Pain Level (0-10 scale): 3-4  Any medication changes, allergies to medications, adverse drug reactions, diagnosis change, or new procedure performed?: [x] No    [] Yes (see summary sheet for update)  Subjective functional status/changes:   [] No changes reported  Patient with CC of left hip pain but he points to the left lower back, iliac crest.  Denies any lower back pain. OBJECTIVE      15 min Therapeutic Exercise:  [x]? ? See flow sheet : Emphasis placed on improving available lumbopelvic and LE AROM and strength   Rationale: increase ROM and increase strength to improve the patients ability to improve ease with self-care ADLs     30 min Neuromuscular Re-education:  [x]? ?  See flow sheet : Emphasis placed on improving activation and recruitment of the anterior abdominal and gluteal musculature and improving lumbopelvic kinesthetic awareness   Rationale: increase ROM, increase strength, improve balance and increase proprioception  to improve the patients ability to improve safety with functional ADLs          With   [] TE   [] TA   [] neuro   [] other: Patient Education: [x] Review HEP    [] Progressed/Changed HEP based on:   [] positioning   [] body mechanics   [] transfers   [] heat/ice application    [] other:      Other Objective/Functional Measures: Antalgic type gait. Stands with left iliac crest elevated.      Pain Level (0-10 scale) post treatment: 3-4    ASSESSMENT/Changes in Function: Patient continues with pain and still having difficulty with some of his ADLs but he has been noting easier entry into his truck. Patient will continue to benefit from skilled PT services to modify and progress therapeutic interventions, address functional mobility deficits, address ROM deficits, address strength deficits, analyze and address soft tissue restrictions, analyze and cue movement patterns, analyze and modify body mechanics/ergonomics, assess and modify postural abnormalities, address imbalance/dizziness and instruct in home and community integration to attain remaining goals. [x]  See Plan of Care  []  See progress note/recertification  []  See Discharge Summary         Progress towards goals / Updated goals:  Short Term Goals: To be accomplished in 1-2 treatments:  1.  Patient will become proficient in their HEP and will be compliant in performing that program.  Evaluation:   Patient given a written/illustrated HEP. Current: Met, Initiated his HEP yesterday.  1/11/2022     Long Term Goals: To be accomplished in 10  treatments:  1. Patient's pain level will be 1-4/10 with activity in order to improve patient's ability to perform normal ADLs. Evaluation:  1/10-9/10  Current:  Progressing, Pain Level = 0/10 - 7/10, 1/24/2022  2. Patient will demonstrate AROM Lumbar Spine WFL with minimal pain to increase ease of ADLs. Evaluation:  AROM L/S flexion limited 25%, extension limited 100%, Right SB limited 25%, Left SB limited 50%, Right and Left Rotation limited 25%.  Patient notes pain with extension, SB and right rotation. Current:  AROM L/S flexion WNL, extension limited 100%, Right SB limited 25%, Left SB limited 50%, Right and Left Rotation limited 25%. (+) pain with ext, left SB and right rotation. 1/31/2022. 3. Patient will increase FOTO score to 50 to indicate increased functional mobility. Evaluation: FOTO = 42  4.  Patient will report moderate to little difficulty with his usual work around the house in order to increase his functional activity level.  Evaluation:  Note extreme difficulty with his usual work. Current:  Notes moderate difficulty with his usual work. 1/31/2022. Goal Met.     PLAN  [x]  Upgrade activities as tolerated     [x]  Continue plan of care  []  Update interventions per flow sheet       []  Discharge due to:_  []  Other:_      Rody Murphy, PT 1/31/2022  1:19 PM    Future Appointments   Date Time Provider [facility]   2/2/2022 11:00 AM CARLOS BatesT [facility]   2/4/2022  2:00 PM Johny Wade, PT [facility]   2/7/2022  1:15 PM Johny Wade, PT [facility]   2/9/2022  2:00 PM Maral Darden, [facility]   4/26/2022  1:00 PM Leonid Kerns MD [facility]

## 2022-02-02 ENCOUNTER — HOSPITAL ENCOUNTER (OUTPATIENT)
Dept: PHYSICAL THERAPY | Age: 77
Discharge: HOME | End: 2022-02-02
Payer: MEDICARE

## 2022-02-02 PROCEDURE — 97112 NEUROMUSCULAR REEDUCATION: CPT

## 2022-02-02 PROCEDURE — 97110 THERAPEUTIC EXERCISES: CPT

## 2022-02-02 PROCEDURE — 97140 MANUAL THERAPY 1/> REGIONS: CPT

## 2022-02-02 NOTE — PROGRESS NOTES
PT DAILY TREATMENT NOTE     Patient Name: Venice Gaffney. Date:2022  : 1945  [x]  Patient  Verified  Payor: Jasmin Montes / Plan: Alysia Douglass / Product Type: Managed Care Medicare /    In time: 11:00A  Out time:11:53A  Total Treatment Time (min): 53  Visit #: 9 of 10    Medicare/BCBS Only   Total Timed Codes (min):  53 1:1 Treatment Time:  53       Treatment Area: Other low back pain [M54.59]    SUBJECTIVE  Pain Level (0-10 scale): 3  Any medication changes, allergies to medications, adverse drug reactions, diagnosis change, or new procedure performed?: [x] No    [] Yes (see summary sheet for update)  Subjective functional status/changes:   [] No changes reported  Patient arrives to session w/ report of left hip pain> low back. Reports only improvement post surg/since starting PT is transferring in/out of vehicle. States \"this hip didn't start until I had surgery on my knee\". OBJECTIVE      18 min Therapeutic Exercise:  [x]? ? See flow sheet : Emphasis placed on improving available lumbopelvic and LE AROM and strength   Rationale: increase ROM and increase strength to improve the patients ability to improve ease with self-care ADLs     25 min Neuromuscular Re-education:  [x]? ?  See flow sheet : Emphasis placed on improving activation and recruitment of the anterior abdominal and gluteal musculature and improving lumbopelvic kinesthetic awareness   Rationale: increase ROM, increase strength, improve balance and increase proprioception  to improve the patients ability to improve safety with functional ADLs    10 min Manual Therapy:  Trial - (supine): manual stretching of left piriformis and hamstring; (right sidelying): IC/TPr to left glute med   The manual therapy interventions were performed at a separate and distinct time from the therapeutic activities interventions.   Rationale: decrease pain, increase ROM, increase tissue extensibility, decrease trigger points and increase postural awareness to increase tolerance to therex and return to PLOF         With   [x] TE   [] TA   [x] neuro   [] other: Patient Education: [x] Review HEP    [] Progressed/Changed HEP based on:   [] positioning   [] body mechanics   [] transfers   [] heat/ice application    [] other:      Other Objective/Functional Measures:   Gait: (+) left trendelenburg, antalgic w/ decreased TKE upon stance phase; left iliac crest elevated w/ significant increased right (slightly flexed) trunk lean    Soft tissue restrictions through left glute med    Observable left knee ext restrictions (in comparison to contralateral LE) while supine on plinth    Pain Level (0-10 scale) post treatment: 3    ASSESSMENT/Changes in Function:   Initiated session with trial of manual therapy techniques listed above w/ pt verbally reporting improvements with performance, however no change in symptoms following. Emphasized ex to target current mobility restrictions and strength deficits, however again no change in gait performance/pain levels after. Formal reassessment to be completed at next schedule f/u. Pt may benefit further from addressing left knee ext mobility deficits, w/ possible measurement of leg length w/ prescribed orthotic if necessary (if pt continues to report no change in functional status/pain levels). Discussed above info with pt w/ him verbalizing understanding. Patient will continue to benefit from skilled PT services to modify and progress therapeutic interventions, address functional mobility deficits, address ROM deficits, address strength deficits, analyze and address soft tissue restrictions, analyze and cue movement patterns, analyze and modify body mechanics/ergonomics, assess and modify postural abnormalities, address imbalance/dizziness and instruct in home and community integration to attain remaining goals.      [x]  See Plan of Care  []  See progress note/recertification  []  See Discharge Summary Progress towards goals / Updated goals:  Short Term Goals: To be accomplished in 1-2 treatments:  1.  Patient will become proficient in their HEP and will be compliant in performing that program.  Evaluation:   Patient given a written/illustrated HEP. Current: Met, Initiated his HEP yesterday.  1/11/2022     Long Term Goals: To be accomplished in 10  treatments:  1. Patient's pain level will be 1-4/10 with activity in order to improve patient's ability to perform normal ADLs. Evaluation:  1/10-9/10  Current:  Progressing, Pain Level = 0/10 - 7/10, 2/02/2022  2. Patient will demonstrate AROM Lumbar Spine WFL with minimal pain to increase ease of ADLs. Evaluation:  AROM L/S flexion limited 25%, extension limited 100%, Right SB limited 25%, Left SB limited 50%, Right and Left Rotation limited 25%.  Patient notes pain with extension, SB and right rotation. Current:  AROM L/S flexion WNL, extension limited 100%, Right SB limited 25%, Left SB limited 50%, Right and Left Rotation limited 25%. (+) pain with ext, left SB and right rotation. 1/31/2022. 3. Patient will increase FOTO score to 50 to indicate increased functional mobility. Evaluation: FOTO = 42  4. Patient will report moderate to little difficulty with his usual work around the house in order to increase his functional activity level. Evaluation:  Note extreme difficulty with his usual work. Current:  Notes moderate difficulty with his usual work. 1/31/2022. Goal Met.     PLAN  [x]  Upgrade activities as tolerated     [x]  Continue plan of care  [x]  Update interventions per flow sheet       []  Discharge due to:_  []  Other:_       Simón White DPT 2/2/2022  1:19 PM    Future Appointments   Date Time Provider Gonzalo Huerta   2/2/2022 11:00 AM Mirta Arellano DPT [facility]   2/4/2022  2:00 PM Selene Torres, PT [facility]   2/7/2022  1:15 PM Selene Torres PT [facility]   2/9/2022  2:00 PM Dana Zamarripa [facility]   4/26/2022  1:00 PM Valentina Nagel MD [facility]

## 2022-02-04 ENCOUNTER — HOSPITAL ENCOUNTER (OUTPATIENT)
Dept: PHYSICAL THERAPY | Age: 77
Discharge: HOME | End: 2022-02-04
Payer: MEDICARE

## 2022-02-04 PROCEDURE — 97140 MANUAL THERAPY 1/> REGIONS: CPT | Performed by: PHYSICAL THERAPIST

## 2022-02-04 PROCEDURE — 97110 THERAPEUTIC EXERCISES: CPT | Performed by: PHYSICAL THERAPIST

## 2022-02-04 PROCEDURE — 97112 NEUROMUSCULAR REEDUCATION: CPT | Performed by: PHYSICAL THERAPIST
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
## 2022-02-07 ENCOUNTER — HOSPITAL ENCOUNTER (OUTPATIENT)
Dept: PHYSICAL THERAPY | Age: 77
Discharge: HOME | End: 2022-02-07
Payer: MEDICARE

## 2022-02-07 PROCEDURE — 97110 THERAPEUTIC EXERCISES: CPT | Performed by: PHYSICAL THERAPIST

## 2022-02-07 PROCEDURE — 97112 NEUROMUSCULAR REEDUCATION: CPT | Performed by: PHYSICAL THERAPIST

## 2022-02-07 PROCEDURE — 97140 MANUAL THERAPY 1/> REGIONS: CPT | Performed by: PHYSICAL THERAPIST

## 2022-02-07 NOTE — PROGRESS NOTES
PT DAILY TREATMENT NOTE     Patient Name: Missael Squires. UBKC:[id number]  : 1945  [x]  Patient  Verified  Payor: Vanna Kim / Plan: Aggie Zimmerman / Product Type: Managed Care Medicare /    In time:1:15  Out time:2:03  Total Treatment Time (min): 48  Visit #: 1 of 8    Medicare/BCBS Only   Total Timed Codes (min):  48 1:1 Treatment Time:  42       Treatment Area: Other low back pain [M54.59]    SUBJECTIVE  Pain Level (0-10 scale): 2  Any medication changes, allergies to medications, adverse drug reactions, diagnosis change, or new procedure performed?: [x] No    [] Yes (see summary sheet for update)  Subjective functional status/changes:   [] No changes reported  Patient has put a heel lift in his left shoe and he feels this has made a difference in his left side pain. States it is his \"hip bone\" but he points to his left ASIS as the point of pain. OBJECTIVE    18 min Therapeutic Exercise:  [x]? ??? See flow sheet : Emphasis placed on improving available lumbopelvic and LE AROM and strength   Rationale: increase ROM and increase strength to improve the patients ability to improve ease with self-care ADLs     22 min Neuromuscular Re-education:  [x]? ???  See flow sheet : Emphasis placed on improving activation and recruitment of the anterior abdominal and gluteal musculature and improving lumbopelvic kinesthetic awareness   Rationale: increase ROM, increase strength, improve balance and increase proprioception  to improve the patients ability to improve safety with functional ADLs     8 min Manual Therapy:  Trial - (supine): manual stretching of left piriformis and hamstring. Prone lying TPR to the left gluteus medius. The manual therapy interventions were performed at a separate and distinct time from the therapeutic activities interventions.   Rationale: decrease pain, increase ROM, increase tissue extensibility, decrease trigger points and increase postural awareness to increase tolerance to therex and return to PLOF           With   [] TE   [] TA   [] neuro   [] other: Patient Education: [x] Review HEP    [] Progressed/Changed HEP based on:   [] positioning   [] body mechanics   [] transfers   [] heat/ice application    [] other:      Other Objective/Functional Measures: Trigger points in the left gluteal muscles. Patient stands with a left lateral lean. Pain Level (0-10 scale) post treatment: 2-3    ASSESSMENT/Changes in Function: Patient with continued left \"hip\" pain with altered gait and functional activity level. Patient will continue to benefit from skilled PT services to modify and progress therapeutic interventions, address functional mobility deficits, address ROM deficits, address strength deficits, analyze and address soft tissue restrictions, analyze and cue movement patterns, analyze and modify body mechanics/ergonomics, assess and modify postural abnormalities, address imbalance/dizziness and instruct in home and community integration to attain remaining goals. [x]  See Plan of Care  []  See progress note/recertification  []  See Discharge Summary         Progress towards goals / Updated goals:  1. Patient's pain level will be 1-4/10 with activity in order to improve patient's ability to perform normal ADLs. PN  Progressing, Pain Level = 0/10 - 7/10, 2/02/2022  2. Patient will demonstrate AROM Lumbar Spine WFL with minimal pain to increase ease of ADLs. PN:   AROM L/S flexion WNL, extension limited 100%, Right SB limited 25%, Left SB limited 50%, Right and Left Rotation limited 25%.  (+) pain with ext, left SB and right rotation.  1/31/2022. Progressing  3. Patient will increase FOTO score to 50 to indicate increased functional mobility. PN:  FOTO = 47.  2/4/2022.  Progressing. 4. Patient will report little difficulty with his usual work around the house in order to increase his functional activity level.   PN:  Notes moderate difficulty with his usual work.  2/4/2022.  Progressing     PLAN  [x]  Upgrade activities as tolerated     [x]  Continue plan of care  []  Update interventions per flow sheet       []  Discharge due to:_  []  Other:_      Kaye Jeffrey, PT 2/7/2022  1:21 PM    Future Appointments   Date Time Provider Gonzalo Deanna   2/9/2022  2:00 PM Dana Steele [facility]   2/14/2022  2:45 PM Flor Elias, PT [facility]   2/18/2022  1:15 PM Elana Aguillon, PT [facility]   2/21/2022  3:30 PM Brinda Rubi, PTA [facility]   2/24/2022  1:15 PM Flor Elias, PT [facility]   2/28/2022  1:15 PM Flor Elias, PT [facility]   3/3/2022  2:00 PM Brinda Rubi, PTA [facility]   3/7/2022  2:00 PM Dana Steele [facility]   3/10/2022  2:00 PM Rhett Bills [facility]   3/14/2022  1:15 PM Flor Deyony, PT [facility]   4/26/2022  1:00 PM Lety Wilkins MD [facility]

## 2022-02-09 ENCOUNTER — TELEPHONE (OUTPATIENT)
Dept: PHYSICAL THERAPY | Age: 77
End: 2022-02-09

## 2022-02-09 ENCOUNTER — HOSPITAL ENCOUNTER (OUTPATIENT)
Dept: PHYSICAL THERAPY | Age: 77
End: 2022-02-09
Payer: MEDICARE

## 2022-02-10 ENCOUNTER — HOSPITAL ENCOUNTER (OUTPATIENT)
Dept: PHYSICAL THERAPY | Age: 77
Discharge: HOME | End: 2022-02-10
Payer: MEDICARE

## 2022-02-10 PROCEDURE — 97110 THERAPEUTIC EXERCISES: CPT

## 2022-02-10 PROCEDURE — 97140 MANUAL THERAPY 1/> REGIONS: CPT

## 2022-02-10 PROCEDURE — 97112 NEUROMUSCULAR REEDUCATION: CPT

## 2022-02-10 NOTE — PROGRESS NOTES
PT DAILY TREATMENT NOTE     Patient Name: Donta Augustin. Date:2/10/2022  : 1945  [x]  Patient  Verified  Payor: Becki Powell / Plan: Cordell Ivy / Product Type: Managed Care Medicare /    In time:1:55  Out time:2:43  Total Treatment Time (min): 48  Visit #:2 of 8    Medicare/BCBS Only   Total Timed Codes (min):  48 1:1 Treatment Time:  48       Treatment Area: Other low back pain [M54.59]    SUBJECTIVE  Pain Level (0-10 scale): 2  Any medication changes, allergies to medications, adverse drug reactions, diagnosis change, or new procedure performed?: [x] No    [] Yes (see summary sheet for update)  Subjective functional status/changes:   [] No changes reported  Patient states he has noticed improvement with use of heel lift. OBJECTIVE       18 min Therapeutic Exercise:  [x]? ???? See flow sheet : Emphasis placed on improving available lumbopelvic and LE AROM and strength   Rationale: increase ROM and increase strength to improve the patients ability to improve ease with self-care ADLs     22 min Neuromuscular Re-education:  [x]? ????  See flow sheet : Emphasis placed on improving activation and recruitment of the anterior abdominal and gluteal musculature and improving lumbopelvic kinesthetic awareness   Rationale: increase ROM, increase strength, improve balance and increase proprioception  to improve the patients ability to improve safety with functional ADLs     8 min Manual Therapy:  Trial - (supine): manual stretching of left piriformis and hamstring. side lying TPR to the left gluteus medius. The manual therapy interventions were performed at a separate and distinct time from the therapeutic activities interventions.   Rationale: decrease pain, increase ROM, increase tissue extensibility, decrease trigger points and increase postural awareness to increase tolerance to therex and return to PLOF               With   [] TE   [] TA   [] neuro   [] other: Patient Education: [x] Review HEP    [] Progressed/Changed HEP based on:   [] positioning   [] body mechanics   [] transfers   [] heat/ice application    [] other:      Other Objective/Functional Measures: patient ambulates with trendelenburg gait pattern. Pain Level (0-10 scale) post treatment: 3    ASSESSMENT/Changes in Function: Patient continues to present with trigger points along left glut med and QL. Patient will continue to benefit from skilled PT services to modify and progress therapeutic interventions, address functional mobility deficits, address ROM deficits, address strength deficits, analyze and address soft tissue restrictions and analyze and cue movement patterns to attain remaining goals. []  See Plan of Care  []  See progress note/recertification  []  See Discharge Summary         Progress towards goals / Updated goals:  1. Patient's pain level will be 1-4/10 with activity in order to improve patient's ability to perform normal ADLs. PN  Progressing, Pain Level = 0/10 - 7/10, 2/02/2022  2. Patient will demonstrate AROM Lumbar Spine WFL with minimal pain to increase ease of ADLs. PN:   AROM L/S flexion WNL, extension limited 100%, Right SB limited 25%, Left SB limited 50%, Right and Left Rotation limited 25%.  (+) pain with ext, left SB and right rotation.  1/31/2022.  Progressing  3. Patient will increase FOTO score to 50 to indicate increased functional mobility. PN:  FOTO = 47.  2/4/2022.  Progressing. 4. Patient will report little difficulty with his usual work around the house in order to increase his functional activity level.   PN:  Notes moderate difficulty with his usual work.  2/4/2022.  Progressing    PLAN  []  Upgrade activities as tolerated     [x]  Continue plan of care  []  Update interventions per flow sheet       []  Discharge due to:_  []  Other:_      Nannette Ivy, PTA 2/10/2022  2:00 PM    Future Appointments   Date Time Provider Gonzalo Huerta   2/14/2022  2:45 PM Eileen Carson, PT [facility]   2/18/2022  1:15 PM Dana De La Fuente [facility]   2/21/2022  3:30 PM Jonathan Brody, PTA [facility]   2/24/2022  1:15 PM Eileen Carson, PT [facility]   2/28/2022  1:15 PM Eileen Carson, PT [facility]   3/3/2022  2:00 PM Jonathan Brody, VIOLETTE [facility]   3/7/2022  2:00 PM aDna De La Fuente [facility]   3/10/2022  2:00 PM Edy Cuff [facility]   3/14/2022  1:15 PM Eileen Carson, PT [facility]   4/26/2022  1:00 PM Rylie Chapin MD [facility]

## 2022-02-14 ENCOUNTER — APPOINTMENT (OUTPATIENT)
Dept: PHYSICAL THERAPY | Age: 77
End: 2022-02-14
Payer: MEDICARE

## 2022-02-14 ENCOUNTER — TELEPHONE (OUTPATIENT)
Dept: PHYSICAL THERAPY | Age: 77
End: 2022-02-14

## 2022-02-17 ENCOUNTER — TELEPHONE (OUTPATIENT)
Dept: PHYSICAL THERAPY | Age: 77
End: 2022-02-17

## 2022-02-18 ENCOUNTER — APPOINTMENT (OUTPATIENT)
Dept: PHYSICAL THERAPY | Age: 77
End: 2022-02-18
Payer: MEDICARE

## 2022-02-21 ENCOUNTER — APPOINTMENT (OUTPATIENT)
Dept: PHYSICAL THERAPY | Age: 77
End: 2022-02-21
Payer: MEDICARE

## 2022-02-23 ENCOUNTER — APPOINTMENT (OUTPATIENT)
Dept: PHYSICAL THERAPY | Age: 77
End: 2022-02-23
Payer: MEDICARE

## 2022-02-24 ENCOUNTER — APPOINTMENT (OUTPATIENT)
Dept: PHYSICAL THERAPY | Age: 77
End: 2022-02-24
Payer: MEDICARE

## 2022-02-28 ENCOUNTER — APPOINTMENT (OUTPATIENT)
Dept: PHYSICAL THERAPY | Age: 77
End: 2022-02-28
Payer: MEDICARE

## 2022-03-02 ENCOUNTER — APPOINTMENT (OUTPATIENT)
Dept: PHYSICAL THERAPY | Age: 77
End: 2022-03-02

## 2022-03-03 ENCOUNTER — APPOINTMENT (OUTPATIENT)
Dept: PHYSICAL THERAPY | Age: 77
End: 2022-03-03

## 2022-03-04 ENCOUNTER — TELEPHONE (OUTPATIENT)
Dept: PHYSICAL THERAPY | Age: 77
End: 2022-03-04

## 2022-03-07 ENCOUNTER — APPOINTMENT (OUTPATIENT)
Dept: PHYSICAL THERAPY | Age: 77
End: 2022-03-07

## 2022-03-09 ENCOUNTER — APPOINTMENT (OUTPATIENT)
Dept: PHYSICAL THERAPY | Age: 77
End: 2022-03-09

## 2022-03-10 ENCOUNTER — APPOINTMENT (OUTPATIENT)
Dept: PHYSICAL THERAPY | Age: 77
End: 2022-03-10

## 2022-03-14 ENCOUNTER — APPOINTMENT (OUTPATIENT)
Dept: PHYSICAL THERAPY | Age: 77
End: 2022-03-14

## 2022-03-16 ENCOUNTER — APPOINTMENT (OUTPATIENT)
Dept: PHYSICAL THERAPY | Age: 77
End: 2022-03-16
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
## (undated) DEVICE — UNDERGLOVE SURG SZ 7.5 BLU LTX FREE SYN POLYISOPRENE

## (undated) DEVICE — GLOVE SURG 7 BIOGEL PI ULTRATOUCH G

## (undated) DEVICE — YANKAUER,BULB TIP,W/O VENT,RIGID,STERILE: Brand: MEDLINE

## (undated) DEVICE — 3M™ MICROPORE™ TAPE, 1530-2: Brand: 3M™ MICROPORE™

## (undated) DEVICE — (D)HANDPIECE IRR W/HI FLO TIP -- DUPLICATE USE ITEM 121586

## (undated) DEVICE — SUT MONOCRYL PLUS UD 3-0 --

## (undated) DEVICE — GAUZE SPNG AVANT 4X4 4PLY NS --

## (undated) DEVICE — COVER,MAYO STAND,STERILE: Brand: MEDLINE

## (undated) DEVICE — GOWN,AURORA,FABRIC-REINFORCED,X-LARGE: Brand: MEDLINE

## (undated) DEVICE — SOL IRR NACL 0.9% 500ML POUR --

## (undated) DEVICE — INTENDED FOR TISSUE SEPARATION, AND OTHER PROCEDURES THAT REQUIRE A SHARP SURGICAL BLADE TO PUNCTURE OR CUT.: Brand: BARD-PARKER SAFETY BLADES SIZE 10, STERILE

## (undated) DEVICE — KIT EVAC 400CC DIA1/4IN H PAT 12.5IN 3 SPR RND SHP PVC DRN

## (undated) DEVICE — HOOD, PEEL-AWAY: Brand: FLYTE

## (undated) DEVICE — COVER,TABLE,HEAVY DUTY,77"X90",STRL: Brand: MEDLINE

## (undated) DEVICE — SPONGE LAP 18INX18IN XR ST 5/PK 40PK HPG

## (undated) DEVICE — COUNTER NDL 40 COUNT HLD 70 FOAM BLK ADH W/ MAG

## (undated) DEVICE — DRSG FOAM ANTIMICROBIAL 4X10IN -- MEPILEX BORDER POST-OP AG

## (undated) DEVICE — SUTURE VCRL SZ 0 L27IN ABSRB UD L36MM CT-1 1/2 CIR J260H

## (undated) DEVICE — SHEET,DRAPE,70X100,STERILE: Brand: MEDLINE

## (undated) DEVICE — GOWN,PRECEPT, XLNG/XLRG ,STRL: Brand: MEDLINE

## (undated) DEVICE — STOCKINETTE ORTHOPEDIC IMPERV 36X9 IN STRL

## (undated) DEVICE — BIT DRL SCR PERIPH 2.7MM DISP --

## (undated) DEVICE — HYPODERMIC SAFETY NEEDLE: Brand: MAGELLAN

## (undated) DEVICE — TUBING, SUCTION, 9/32" X 10', STRAIGHT: Brand: MEDLINE

## (undated) DEVICE — SKIN CLOS DERMABND PRINEO 60CM -- DERMABOUND PRINEO

## (undated) DEVICE — 3M™ COBAN™ NL STERILE NON-LATEX SELF-ADHERENT WRAP, 2086S, 6 IN X 5 YD (15 CM X 4,5 M), 12 ROLLS/CASE: Brand: 3M™ COBAN™

## (undated) DEVICE — SYR 20ML LL STRL LF --

## (undated) DEVICE — SPONGE LAP SOFT 18X18 IN X RAY DETECTABLE

## (undated) DEVICE — PENCIL SMK EVAC L10FT TBNG NONSTICK ESU BLDE PLUMEPEN ELITE

## (undated) DEVICE — DRAPE SHLDR 172X100IN STRL

## (undated) DEVICE — STERILE POLYISOPRENE POWDER-FREE SURGICAL GLOVES: Brand: PROTEXIS

## (undated) DEVICE — SUTURE VCRL SZ 1 L27IN ABSRB UD CT-1 L36MM 1/2 CIR J261H

## (undated) DEVICE — STERILE POLYISOPRENE POWDER-FREE SURGICAL GLOVES WITH EMOLLIENT COATING: Brand: PROTEXIS

## (undated) DEVICE — INTENDED FOR TISSUE SEPARATION, AND OTHER PROCEDURES THAT REQUIRE A SHARP SURGICAL BLADE TO PUNCTURE OR CUT.: Brand: BARD-PARKER SAFETY BLADES SIZE 15, STERILE

## (undated) DEVICE — T5 HOOD WITH PEEL AWAY FACE SHIELD

## (undated) DEVICE — (D)PREP SKN CHLRAPRP APPL 26ML -- CONVERT TO ITEM 371833

## (undated) DEVICE — STRYKER PERFORMANCE SERIES SAGITTAL BLADE: Brand: STRYKER PERFORMANCE SERIES

## (undated) DEVICE — BLADE ELECTRODE: Brand: VALLEYLAB

## (undated) DEVICE — 3M™ IOBAN™ 2 ANTIMICROBIAL INCISE DRAPE 6650EZ: Brand: IOBAN™ 2

## (undated) DEVICE — SUTURE VCRL SZ 2-0 L27IN ABSRB UD L26MM CT-2 1/2 CIR J269H

## (undated) DEVICE — T-MAX DISPOSABLE FACE MASK 8 PER BOX

## (undated) DEVICE — GARMENT COMPR L WOM SZ 95 OR ABV M SZ 75 OR ABV FT

## (undated) DEVICE — Device